# Patient Record
Sex: FEMALE | Race: WHITE | HISPANIC OR LATINO | Employment: UNEMPLOYED | URBAN - METROPOLITAN AREA
[De-identification: names, ages, dates, MRNs, and addresses within clinical notes are randomized per-mention and may not be internally consistent; named-entity substitution may affect disease eponyms.]

---

## 2018-11-26 ENCOUNTER — HOSPITAL ENCOUNTER (INPATIENT)
Facility: MEDICAL CENTER | Age: 38
LOS: 1 days | DRG: 638 | End: 2018-11-27
Attending: EMERGENCY MEDICINE | Admitting: HOSPITALIST

## 2018-11-26 DIAGNOSIS — R73.9 HYPERGLYCEMIA: ICD-10-CM

## 2018-11-26 DIAGNOSIS — E13.65 OTHER SPECIFIED DIABETES MELLITUS WITH HYPERGLYCEMIA, WITHOUT LONG-TERM CURRENT USE OF INSULIN (HCC): ICD-10-CM

## 2018-11-26 PROBLEM — E11.9 DIABETES MELLITUS, NEW ONSET (HCC): Status: ACTIVE | Noted: 2018-11-26

## 2018-11-26 PROBLEM — E87.1 HYPONATREMIA: Status: ACTIVE | Noted: 2018-11-26

## 2018-11-26 PROBLEM — N30.00 ACUTE CYSTITIS WITHOUT HEMATURIA: Status: ACTIVE | Noted: 2018-11-26

## 2018-11-26 PROBLEM — B37.31 VAGINAL YEAST INFECTION: Status: ACTIVE | Noted: 2018-11-26

## 2018-11-26 LAB
ALBUMIN SERPL BCP-MCNC: 4.3 G/DL (ref 3.2–4.9)
ALBUMIN/GLOB SERPL: 1.2 G/DL
ALP SERPL-CCNC: 143 U/L (ref 30–99)
ALT SERPL-CCNC: 21 U/L (ref 2–50)
ANION GAP SERPL CALC-SCNC: 13 MMOL/L (ref 0–11.9)
APPEARANCE UR: CLEAR
AST SERPL-CCNC: 15 U/L (ref 12–45)
B-OH-BUTYR SERPL-MCNC: 2.27 MMOL/L (ref 0.02–0.27)
BACTERIA #/AREA URNS HPF: ABNORMAL /HPF
BASOPHILS # BLD AUTO: 0.4 % (ref 0–1.8)
BASOPHILS # BLD: 0.04 K/UL (ref 0–0.12)
BILIRUB SERPL-MCNC: 0.4 MG/DL (ref 0.1–1.5)
BILIRUB UR QL STRIP.AUTO: NEGATIVE
BUN SERPL-MCNC: 20 MG/DL (ref 8–22)
CALCIUM SERPL-MCNC: 9.9 MG/DL (ref 8.5–10.5)
CHLORIDE SERPL-SCNC: 90 MMOL/L (ref 96–112)
CO2 SERPL-SCNC: 23 MMOL/L (ref 20–33)
COLOR UR: YELLOW
CREAT SERPL-MCNC: 0.93 MG/DL (ref 0.5–1.4)
EKG IMPRESSION: NORMAL
EOSINOPHIL # BLD AUTO: 0.05 K/UL (ref 0–0.51)
EOSINOPHIL NFR BLD: 0.5 % (ref 0–6.9)
EPI CELLS #/AREA URNS HPF: ABNORMAL /HPF
ERYTHROCYTE [DISTWIDTH] IN BLOOD BY AUTOMATED COUNT: 35.5 FL (ref 35.9–50)
EST. AVERAGE GLUCOSE BLD GHB EST-MCNC: 364 MG/DL
GLOBULIN SER CALC-MCNC: 3.5 G/DL (ref 1.9–3.5)
GLUCOSE BLD-MCNC: 407 MG/DL (ref 65–99)
GLUCOSE BLD-MCNC: 410 MG/DL (ref 65–99)
GLUCOSE BLD-MCNC: 511 MG/DL (ref 65–99)
GLUCOSE BLD-MCNC: 562 MG/DL (ref 65–99)
GLUCOSE SERPL-MCNC: 570 MG/DL (ref 65–99)
GLUCOSE UR STRIP.AUTO-MCNC: >=1000 MG/DL
HBA1C MFR BLD: 14.3 % (ref 0–5.6)
HCT VFR BLD AUTO: 45.3 % (ref 37–47)
HGB BLD-MCNC: 16 G/DL (ref 12–16)
IMM GRANULOCYTES # BLD AUTO: 0.03 K/UL (ref 0–0.11)
IMM GRANULOCYTES NFR BLD AUTO: 0.3 % (ref 0–0.9)
KETONES UR STRIP.AUTO-MCNC: 15 MG/DL
LEUKOCYTE ESTERASE UR QL STRIP.AUTO: NEGATIVE
LYMPHOCYTES # BLD AUTO: 2.76 K/UL (ref 1–4.8)
LYMPHOCYTES NFR BLD: 27.2 % (ref 22–41)
MAGNESIUM SERPL-MCNC: 2 MG/DL (ref 1.5–2.5)
MCH RBC QN AUTO: 29.5 PG (ref 27–33)
MCHC RBC AUTO-ENTMCNC: 35.3 G/DL (ref 33.6–35)
MCV RBC AUTO: 83.6 FL (ref 81.4–97.8)
MICRO URNS: ABNORMAL
MONOCYTES # BLD AUTO: 0.65 K/UL (ref 0–0.85)
MONOCYTES NFR BLD AUTO: 6.4 % (ref 0–13.4)
NEUTROPHILS # BLD AUTO: 6.62 K/UL (ref 2–7.15)
NEUTROPHILS NFR BLD: 65.2 % (ref 44–72)
NITRITE UR QL STRIP.AUTO: NEGATIVE
NRBC # BLD AUTO: 0 K/UL
NRBC BLD-RTO: 0 /100 WBC
PH UR STRIP.AUTO: 5 [PH]
PLATELET # BLD AUTO: 368 K/UL (ref 164–446)
PMV BLD AUTO: 10.5 FL (ref 9–12.9)
POTASSIUM SERPL-SCNC: 3.6 MMOL/L (ref 3.6–5.5)
PROT SERPL-MCNC: 7.8 G/DL (ref 6–8.2)
PROT UR QL STRIP: 30 MG/DL
RBC # BLD AUTO: 5.42 M/UL (ref 4.2–5.4)
RBC # URNS HPF: ABNORMAL /HPF
RBC UR QL AUTO: NEGATIVE
SODIUM SERPL-SCNC: 126 MMOL/L (ref 135–145)
SP GR UR STRIP.AUTO: 1.04
T4 FREE SERPL-MCNC: 1.18 NG/DL (ref 0.53–1.43)
TROPONIN I SERPL-MCNC: <0.01 NG/ML (ref 0–0.04)
TSH SERPL DL<=0.005 MIU/L-ACNC: 1.57 UIU/ML (ref 0.38–5.33)
UROBILINOGEN UR STRIP.AUTO-MCNC: 0.2 MG/DL
WBC # BLD AUTO: 10.2 K/UL (ref 4.8–10.8)
WBC #/AREA URNS HPF: ABNORMAL /HPF

## 2018-11-26 PROCEDURE — 96365 THER/PROPH/DIAG IV INF INIT: CPT

## 2018-11-26 PROCEDURE — 84443 ASSAY THYROID STIM HORMONE: CPT

## 2018-11-26 PROCEDURE — 700102 HCHG RX REV CODE 250 W/ 637 OVERRIDE(OP): Performed by: EMERGENCY MEDICINE

## 2018-11-26 PROCEDURE — 80053 COMPREHEN METABOLIC PANEL: CPT

## 2018-11-26 PROCEDURE — 87186 SC STD MICRODIL/AGAR DIL: CPT

## 2018-11-26 PROCEDURE — 81001 URINALYSIS AUTO W/SCOPE: CPT

## 2018-11-26 PROCEDURE — 93005 ELECTROCARDIOGRAM TRACING: CPT | Performed by: EMERGENCY MEDICINE

## 2018-11-26 PROCEDURE — 700105 HCHG RX REV CODE 258: Performed by: EMERGENCY MEDICINE

## 2018-11-26 PROCEDURE — 770009 HCHG ROOM/CARE - ONCOLOGY SEMI PRI*

## 2018-11-26 PROCEDURE — 96361 HYDRATE IV INFUSION ADD-ON: CPT

## 2018-11-26 PROCEDURE — A9270 NON-COVERED ITEM OR SERVICE: HCPCS | Performed by: HOSPITALIST

## 2018-11-26 PROCEDURE — 82010 KETONE BODYS QUAN: CPT

## 2018-11-26 PROCEDURE — 700111 HCHG RX REV CODE 636 W/ 250 OVERRIDE (IP): Performed by: HOSPITALIST

## 2018-11-26 PROCEDURE — 93005 ELECTROCARDIOGRAM TRACING: CPT

## 2018-11-26 PROCEDURE — 36415 COLL VENOUS BLD VENIPUNCTURE: CPT

## 2018-11-26 PROCEDURE — 84439 ASSAY OF FREE THYROXINE: CPT

## 2018-11-26 PROCEDURE — 99223 1ST HOSP IP/OBS HIGH 75: CPT | Performed by: HOSPITALIST

## 2018-11-26 PROCEDURE — 700105 HCHG RX REV CODE 258: Performed by: HOSPITALIST

## 2018-11-26 PROCEDURE — 99285 EMERGENCY DEPT VISIT HI MDM: CPT

## 2018-11-26 PROCEDURE — 87077 CULTURE AEROBIC IDENTIFY: CPT

## 2018-11-26 PROCEDURE — 82962 GLUCOSE BLOOD TEST: CPT | Mod: 91

## 2018-11-26 PROCEDURE — 96375 TX/PRO/DX INJ NEW DRUG ADDON: CPT

## 2018-11-26 PROCEDURE — 700102 HCHG RX REV CODE 250 W/ 637 OVERRIDE(OP): Performed by: HOSPITALIST

## 2018-11-26 PROCEDURE — 85025 COMPLETE CBC W/AUTO DIFF WBC: CPT

## 2018-11-26 PROCEDURE — 83735 ASSAY OF MAGNESIUM: CPT

## 2018-11-26 PROCEDURE — 87086 URINE CULTURE/COLONY COUNT: CPT

## 2018-11-26 PROCEDURE — 84484 ASSAY OF TROPONIN QUANT: CPT

## 2018-11-26 PROCEDURE — 83036 HEMOGLOBIN GLYCOSYLATED A1C: CPT

## 2018-11-26 RX ORDER — FLUCONAZOLE 100 MG/1
100 TABLET ORAL DAILY
Status: DISCONTINUED | OUTPATIENT
Start: 2018-11-27 | End: 2018-11-26

## 2018-11-26 RX ORDER — PROMETHAZINE HYDROCHLORIDE 25 MG/1
12.5-25 TABLET ORAL EVERY 4 HOURS PRN
Status: DISCONTINUED | OUTPATIENT
Start: 2018-11-26 | End: 2018-11-27 | Stop reason: HOSPADM

## 2018-11-26 RX ORDER — PROMETHAZINE HYDROCHLORIDE 25 MG/1
12.5-25 SUPPOSITORY RECTAL EVERY 4 HOURS PRN
Status: DISCONTINUED | OUTPATIENT
Start: 2018-11-26 | End: 2018-11-27 | Stop reason: HOSPADM

## 2018-11-26 RX ORDER — ONDANSETRON 4 MG/1
4 TABLET, ORALLY DISINTEGRATING ORAL EVERY 4 HOURS PRN
Status: DISCONTINUED | OUTPATIENT
Start: 2018-11-26 | End: 2018-11-27 | Stop reason: HOSPADM

## 2018-11-26 RX ORDER — ACETAMINOPHEN 325 MG/1
650 TABLET ORAL EVERY 6 HOURS PRN
Status: DISCONTINUED | OUTPATIENT
Start: 2018-11-26 | End: 2018-11-27 | Stop reason: HOSPADM

## 2018-11-26 RX ORDER — BISACODYL 10 MG
10 SUPPOSITORY, RECTAL RECTAL
Status: DISCONTINUED | OUTPATIENT
Start: 2018-11-26 | End: 2018-11-27 | Stop reason: HOSPADM

## 2018-11-26 RX ORDER — POLYETHYLENE GLYCOL 3350 17 G/17G
1 POWDER, FOR SOLUTION ORAL
Status: DISCONTINUED | OUTPATIENT
Start: 2018-11-26 | End: 2018-11-27 | Stop reason: HOSPADM

## 2018-11-26 RX ORDER — ENALAPRILAT 1.25 MG/ML
1.25 INJECTION INTRAVENOUS EVERY 6 HOURS PRN
Status: DISCONTINUED | OUTPATIENT
Start: 2018-11-26 | End: 2018-11-27 | Stop reason: HOSPADM

## 2018-11-26 RX ORDER — SODIUM CHLORIDE 9 MG/ML
1000 INJECTION, SOLUTION INTRAVENOUS ONCE
Status: COMPLETED | OUTPATIENT
Start: 2018-11-26 | End: 2018-11-26

## 2018-11-26 RX ORDER — DEXTROSE MONOHYDRATE 25 G/50ML
25 INJECTION, SOLUTION INTRAVENOUS
Status: DISCONTINUED | OUTPATIENT
Start: 2018-11-26 | End: 2018-11-27 | Stop reason: HOSPADM

## 2018-11-26 RX ORDER — AMOXICILLIN 250 MG
2 CAPSULE ORAL 2 TIMES DAILY
Status: DISCONTINUED | OUTPATIENT
Start: 2018-11-26 | End: 2018-11-27 | Stop reason: HOSPADM

## 2018-11-26 RX ORDER — ONDANSETRON 2 MG/ML
4 INJECTION INTRAMUSCULAR; INTRAVENOUS EVERY 4 HOURS PRN
Status: DISCONTINUED | OUTPATIENT
Start: 2018-11-26 | End: 2018-11-27 | Stop reason: HOSPADM

## 2018-11-26 RX ORDER — FLUCONAZOLE 100 MG/1
100 TABLET ORAL DAILY
Status: DISCONTINUED | OUTPATIENT
Start: 2018-11-27 | End: 2018-11-27 | Stop reason: HOSPADM

## 2018-11-26 RX ORDER — SODIUM CHLORIDE 9 MG/ML
INJECTION, SOLUTION INTRAVENOUS CONTINUOUS
Status: DISCONTINUED | OUTPATIENT
Start: 2018-11-26 | End: 2018-11-27

## 2018-11-26 RX ADMIN — INSULIN HUMAN 9 UNITS: 100 INJECTION, SOLUTION PARENTERAL at 20:10

## 2018-11-26 RX ADMIN — INSULIN HUMAN 9 UNITS: 100 INJECTION, SOLUTION PARENTERAL at 17:52

## 2018-11-26 RX ADMIN — INSULIN HUMAN 10 UNITS: 100 INJECTION, SOLUTION PARENTERAL at 09:42

## 2018-11-26 RX ADMIN — SODIUM CHLORIDE: 9 INJECTION, SOLUTION INTRAVENOUS at 12:30

## 2018-11-26 RX ADMIN — SODIUM CHLORIDE: 9 INJECTION, SOLUTION INTRAVENOUS at 22:05

## 2018-11-26 RX ADMIN — INSULIN HUMAN 10 UNITS: 100 INJECTION, SUSPENSION SUBCUTANEOUS at 17:52

## 2018-11-26 RX ADMIN — SODIUM CHLORIDE 1000 ML: 9 INJECTION, SOLUTION INTRAVENOUS at 09:13

## 2018-11-26 RX ADMIN — ACETAMINOPHEN 650 MG: 325 TABLET, FILM COATED ORAL at 17:59

## 2018-11-26 RX ADMIN — CEFTRIAXONE SODIUM 2 G: 2 INJECTION, POWDER, FOR SOLUTION INTRAMUSCULAR; INTRAVENOUS at 13:25

## 2018-11-26 ASSESSMENT — COGNITIVE AND FUNCTIONAL STATUS - GENERAL
MOBILITY SCORE: 24
SUGGESTED CMS G CODE MODIFIER DAILY ACTIVITY: CH
DAILY ACTIVITIY SCORE: 24
SUGGESTED CMS G CODE MODIFIER MOBILITY: CH

## 2018-11-26 ASSESSMENT — ENCOUNTER SYMPTOMS
SHORTNESS OF BREATH: 0
COUGH: 0
BACK PAIN: 0
WEIGHT LOSS: 0
NECK PAIN: 0
BLURRED VISION: 1
PALPITATIONS: 0
DEPRESSION: 0
CHILLS: 0
SORE THROAT: 0
NAUSEA: 0
NERVOUS/ANXIOUS: 0
HEADACHES: 0
ABDOMINAL PAIN: 0
DIARRHEA: 0
SENSORY CHANGE: 0
FEVER: 0

## 2018-11-26 ASSESSMENT — PATIENT HEALTH QUESTIONNAIRE - PHQ9
2. FEELING DOWN, DEPRESSED, IRRITABLE, OR HOPELESS: NOT AT ALL
SUM OF ALL RESPONSES TO PHQ9 QUESTIONS 1 AND 2: 0
1. LITTLE INTEREST OR PLEASURE IN DOING THINGS: NOT AT ALL

## 2018-11-26 ASSESSMENT — COPD QUESTIONNAIRES
DO YOU EVER COUGH UP ANY MUCUS OR PHLEGM?: NO/ONLY WITH OCCASIONAL COLDS OR INFECTIONS
IN THE PAST 12 MONTHS DO YOU DO LESS THAN YOU USED TO BECAUSE OF YOUR BREATHING PROBLEMS: DISAGREE/UNSURE
DURING THE PAST 4 WEEKS HOW MUCH DID YOU FEEL SHORT OF BREATH: NONE/LITTLE OF THE TIME
COPD SCREENING SCORE: 0
HAVE YOU SMOKED AT LEAST 100 CIGARETTES IN YOUR ENTIRE LIFE: NO/DON'T KNOW

## 2018-11-26 ASSESSMENT — PAIN SCALES - GENERAL
PAINLEVEL_OUTOF10: 0

## 2018-11-26 ASSESSMENT — LIFESTYLE VARIABLES
ALCOHOL_USE: NO
EVER_SMOKED: YES

## 2018-11-26 NOTE — ED TRIAGE NOTES
"Chief Complaint   Patient presents with   • Blurred Vision     x 2 weeks.   • Polydipsia     \"c/o im constantly thirsty\".  also c/o nausea.   • Weight Loss     x 2 weeks.   • Fatigue     x 2 weeks. able to ambulate w/steady gait.   • Muscle Pain     all my muscles are hurting.     Has multiple complaints. Pt states \" I feel like I have a cancer or diabetes\". Denies history of diabetes. NAD. Triage process explained instructed to notify staff for any worsening symptoms. Fingerstick [id number]  "

## 2018-11-26 NOTE — PROGRESS NOTES
Pt arrived to unit. Oriented to floor and room. AOx4, upself. Denies pain or nausea. States she is hungry, provided snacks while pt waits for meal tray. PIV in place, IV abx infusing. Fluids set up to infuse after abx finish. Call light within reach, bed in low and locked position, all needs met at this time.

## 2018-11-26 NOTE — ED PROVIDER NOTES
"ED Provider Note    CHIEF COMPLAINT  Chief Complaint   Patient presents with   • Blurred Vision     x 2 weeks.   • Polydipsia     \"c/o im constantly thirsty\".  also c/o nausea.   • Weight Loss     x 2 weeks.   • Fatigue     x 2 weeks. able to ambulate w/steady gait.   • Muscle Pain     all my muscles are hurting.       HPI  Tonya Bro is a 38 y.o. female who presents complaining of thirst, blurred vision, frequent urination and fatigue.   Onset of symptoms 2 weeks ago.  She has diffuse myalgias and believes she has lost weight.  She states \"I think I have diabetes or cancer or something.  She denies family history of diabetes.  She denies history herself of elevated blood sugar.  Vision disturbance is described as generalized blurriness both eyes.  Symptoms are gradual onset while at rest at home    REVIEW OF SYSTEMS    Constitutional: Fatigue  Respiratory: No shortness of breath  Cardiac: No chest pain or syncope  Gastrointestinal: No abdominal pain  Musculoskeletal: Diffuse myalgia  Neurologic: No headache, no numbness or weakness  Genitourinary: Polyuria       All other systems are negative.       PAST MEDICAL HISTORY  No past medical history on file.    FAMILY HISTORY  No family history on file.    SOCIAL HISTORY  Social History     Social History   • Marital status:      Spouse name: N/A   • Number of children: N/A   • Years of education: N/A     Social History Main Topics   • Smoking status: Not on file   • Smokeless tobacco: Not on file   • Alcohol use Not on file   • Drug use: Unknown   • Sexual activity: Not on file     Other Topics Concern   • Not on file     Social History Narrative   • No narrative on file       SURGICAL HISTORY  No past surgical history on file.    CURRENT MEDICATIONS  Home Medications     Reviewed by Danielle Reno (Pharmacy Tech) on 11/26/18 at 1132  Med List Status: Complete   Medication Last Dose Status        Patient Nimesh Taking any Medications        " "               ALLERGIES  No Known Allergies    PHYSICAL EXAM  VITAL SIGNS: /73   Pulse 97   Temp 36.8 °C (98.3 °F) (Oral)   Resp 19   Ht 1.6 m (5' 3\")   Wt 68.3 kg (150 lb 9.2 oz)   SpO2 96%   BMI 26.67 kg/m²    Constitutional:  Non-toxic appearance.   HENT: No facial swelling.  Mucous membranes dry  Eyes: Anicteric, no conjunctivitis.     Cardiovascular: Tachycardic heart rate, Normal rhythm  Pulmonary:  No wheezing, No rales.   Gastrointestinal: Soft, No tenderness, No masses, no distention  Skin: Warm, Dry, No cyanosis.  No peripheral edema  Neurologic: Speech is clear, follows commands, facial expression is symmetrical.   strength and leg strength equal  Psychiatric: Mildly anxious, later normal mood with normal affect  Musculoskeletal: No flank tenderness    EKG/Labs  Results for orders placed or performed during the hospital encounter of 11/26/18   CBC WITH DIFFERENTIAL   Result Value Ref Range    WBC 10.2 4.8 - 10.8 K/uL    RBC 5.42 (H) 4.20 - 5.40 M/uL    Hemoglobin 16.0 12.0 - 16.0 g/dL    Hematocrit 45.3 37.0 - 47.0 %    MCV 83.6 81.4 - 97.8 fL    MCH 29.5 27.0 - 33.0 pg    MCHC 35.3 (H) 33.6 - 35.0 g/dL    RDW 35.5 (L) 35.9 - 50.0 fL    Platelet Count 368 164 - 446 K/uL    MPV 10.5 9.0 - 12.9 fL    Neutrophils-Polys 65.20 44.00 - 72.00 %    Lymphocytes 27.20 22.00 - 41.00 %    Monocytes 6.40 0.00 - 13.40 %    Eosinophils 0.50 0.00 - 6.90 %    Basophils 0.40 0.00 - 1.80 %    Immature Granulocytes 0.30 0.00 - 0.90 %    Nucleated RBC 0.00 /100 WBC    Neutrophils (Absolute) 6.62 2.00 - 7.15 K/uL    Lymphs (Absolute) 2.76 1.00 - 4.80 K/uL    Monos (Absolute) 0.65 0.00 - 0.85 K/uL    Eos (Absolute) 0.05 0.00 - 0.51 K/uL    Baso (Absolute) 0.04 0.00 - 0.12 K/uL    Immature Granulocytes (abs) 0.03 0.00 - 0.11 K/uL    NRBC (Absolute) 0.00 K/uL   COMP METABOLIC PANEL   Result Value Ref Range    Sodium 126 (L) 135 - 145 mmol/L    Potassium 3.6 3.6 - 5.5 mmol/L    Chloride 90 (L) 96 - 112 mmol/L    " Co2 23 20 - 33 mmol/L    Anion Gap 13.0 (H) 0.0 - 11.9    Glucose 570 (HH) 65 - 99 mg/dL    Bun 20 8 - 22 mg/dL    Creatinine 0.93 0.50 - 1.40 mg/dL    Calcium 9.9 8.5 - 10.5 mg/dL    AST(SGOT) 15 12 - 45 U/L    ALT(SGPT) 21 2 - 50 U/L    Alkaline Phosphatase 143 (H) 30 - 99 U/L    Total Bilirubin 0.4 0.1 - 1.5 mg/dL    Albumin 4.3 3.2 - 4.9 g/dL    Total Protein 7.8 6.0 - 8.2 g/dL    Globulin 3.5 1.9 - 3.5 g/dL    A-G Ratio 1.2 g/dL   URINALYSIS CULTURE, IF INDICATED   Result Value Ref Range    Color Yellow     Character Clear     Specific Gravity 1.037 <1.035    Ph 5.0 5.0 - 8.0    Glucose >=1000 (A) Negative mg/dL    Ketones 15 (A) Negative mg/dL    Protein 30 (A) Negative mg/dL    Bilirubin Negative Negative    Urobilinogen, Urine 0.2 Negative    Nitrite Negative Negative    Leukocyte Esterase Negative Negative    Occult Blood Negative Negative    Micro Urine Req Microscopic    ESTIMATED GFR   Result Value Ref Range    GFR If African American >60 >60 mL/min/1.73 m 2    GFR If Non African American >60 >60 mL/min/1.73 m 2   URINE MICROSCOPIC (W/UA)   Result Value Ref Range    WBC 2-5 /hpf    RBC 0-2 /hpf    Bacteria Moderate (A) None /hpf    Epithelial Cells Few /hpf   TROPONIN   Result Value Ref Range    Troponin I <0.01 0.00 - 0.04 ng/mL   MAGNESIUM   Result Value Ref Range    Magnesium 2.0 1.5 - 2.5 mg/dL   ACCU-CHEK GLUCOSE   Result Value Ref Range    Glucose - Accu-Ck 562 (HH) 65 - 99 mg/dL   ACCU-CHEK GLUCOSE   Result Value Ref Range    Glucose - Accu-Ck 407 (HH) 65 - 99 mg/dL   EKG   Result Value Ref Range    Report       [facility] Emergency Dept.    Test Date:  2018  Pt Name:    JASON DEAN           Department: ER  MRN:        [id number]                      Room:        03  Gender:     Female                       Technician: [id number]  :        1980                   Requested By:ER TRIAGE PROTOCOL  Order #:    [id number]                    Reading  MD:    Measurements  Intervals                                Axis  Rate:       84                           P:          19  WI:         136                          QRS:        35  QRSD:       82                           T:          16  QT:         368  QTc:        436    Interpretive Statements  SINUS RHYTHM  No previous ECG available for comparison           COURSE & MEDICAL DECISION MAKING  Pertinent Labs & Imaging studies reviewed. (See chart for details)  With some evidence of dehydration as well as hyperglycemia, IV fluids have been started.HYDRATION: Based on the patient's presentation of Hyperglycemia the patient was given IV fluids. IV Hydration was used because oral hydration was not adequate alone. Upon recheck following hydration, the patient was Improved with lowered blood sugar. Patient also given insulin IV.  Repeat blood sugar was down to 400.  Patient appears to be new onset diabetes, is admitted for ongoing workup stabilization as well as patient education regarding treatment of this illness.  Of note, patient complained of vaginal itching, refused pelvic exam in the emergency department.      FINAL IMPRESSION     1. Hyperglycemia    2. Other specified diabetes mellitus with hyperglycemia, without long-term current use of insulin (HCC)                    Electronically signed by: Victor Manuel Elkins, 11/26/2018 2:19 PM

## 2018-11-27 VITALS
SYSTOLIC BLOOD PRESSURE: 131 MMHG | RESPIRATION RATE: 18 BRPM | TEMPERATURE: 98 F | WEIGHT: 150.57 LBS | DIASTOLIC BLOOD PRESSURE: 87 MMHG | OXYGEN SATURATION: 96 % | HEART RATE: 74 BPM | BODY MASS INDEX: 26.68 KG/M2 | HEIGHT: 63 IN

## 2018-11-27 PROBLEM — E87.1 HYPONATREMIA: Status: RESOLVED | Noted: 2018-11-26 | Resolved: 2018-11-27

## 2018-11-27 LAB
ALBUMIN SERPL BCP-MCNC: 3.2 G/DL (ref 3.2–4.9)
ALBUMIN/GLOB SERPL: 1.1 G/DL
ALP SERPL-CCNC: 80 U/L (ref 30–99)
ALT SERPL-CCNC: 12 U/L (ref 2–50)
ANION GAP SERPL CALC-SCNC: 6 MMOL/L (ref 0–11.9)
AST SERPL-CCNC: 11 U/L (ref 12–45)
BASOPHILS # BLD AUTO: 0.3 % (ref 0–1.8)
BASOPHILS # BLD: 0.03 K/UL (ref 0–0.12)
BILIRUB SERPL-MCNC: 0.3 MG/DL (ref 0.1–1.5)
BUN SERPL-MCNC: 17 MG/DL (ref 8–22)
CALCIUM SERPL-MCNC: 8.6 MG/DL (ref 8.5–10.5)
CHLORIDE SERPL-SCNC: 105 MMOL/L (ref 96–112)
CO2 SERPL-SCNC: 22 MMOL/L (ref 20–33)
CREAT SERPL-MCNC: 0.77 MG/DL (ref 0.5–1.4)
EOSINOPHIL # BLD AUTO: 0.08 K/UL (ref 0–0.51)
EOSINOPHIL NFR BLD: 0.8 % (ref 0–6.9)
ERYTHROCYTE [DISTWIDTH] IN BLOOD BY AUTOMATED COUNT: 38.2 FL (ref 35.9–50)
GLOBULIN SER CALC-MCNC: 2.8 G/DL (ref 1.9–3.5)
GLUCOSE BLD-MCNC: 205 MG/DL (ref 65–99)
GLUCOSE BLD-MCNC: 228 MG/DL (ref 65–99)
GLUCOSE BLD-MCNC: 259 MG/DL (ref 65–99)
GLUCOSE BLD-MCNC: 397 MG/DL (ref 65–99)
GLUCOSE BLD-MCNC: 430 MG/DL (ref 65–99)
GLUCOSE SERPL-MCNC: 239 MG/DL (ref 65–99)
HCT VFR BLD AUTO: 39 % (ref 37–47)
HGB BLD-MCNC: 13.1 G/DL (ref 12–16)
IMM GRANULOCYTES # BLD AUTO: 0.02 K/UL (ref 0–0.11)
IMM GRANULOCYTES NFR BLD AUTO: 0.2 % (ref 0–0.9)
LYMPHOCYTES # BLD AUTO: 3.47 K/UL (ref 1–4.8)
LYMPHOCYTES NFR BLD: 35.6 % (ref 22–41)
MCH RBC QN AUTO: 29.4 PG (ref 27–33)
MCHC RBC AUTO-ENTMCNC: 33.6 G/DL (ref 33.6–35)
MCV RBC AUTO: 87.6 FL (ref 81.4–97.8)
MONOCYTES # BLD AUTO: 0.57 K/UL (ref 0–0.85)
MONOCYTES NFR BLD AUTO: 5.8 % (ref 0–13.4)
NEUTROPHILS # BLD AUTO: 5.59 K/UL (ref 2–7.15)
NEUTROPHILS NFR BLD: 57.3 % (ref 44–72)
NRBC # BLD AUTO: 0 K/UL
NRBC BLD-RTO: 0 /100 WBC
PLATELET # BLD AUTO: 280 K/UL (ref 164–446)
PMV BLD AUTO: 10.3 FL (ref 9–12.9)
POTASSIUM SERPL-SCNC: 3.4 MMOL/L (ref 3.6–5.5)
PROT SERPL-MCNC: 6 G/DL (ref 6–8.2)
RBC # BLD AUTO: 4.45 M/UL (ref 4.2–5.4)
SODIUM SERPL-SCNC: 133 MMOL/L (ref 135–145)
WBC # BLD AUTO: 9.8 K/UL (ref 4.8–10.8)

## 2018-11-27 PROCEDURE — 700111 HCHG RX REV CODE 636 W/ 250 OVERRIDE (IP): Performed by: HOSPITALIST

## 2018-11-27 PROCEDURE — 700102 HCHG RX REV CODE 250 W/ 637 OVERRIDE(OP): Performed by: HOSPITALIST

## 2018-11-27 PROCEDURE — 80053 COMPREHEN METABOLIC PANEL: CPT

## 2018-11-27 PROCEDURE — 82962 GLUCOSE BLOOD TEST: CPT

## 2018-11-27 PROCEDURE — A9270 NON-COVERED ITEM OR SERVICE: HCPCS | Performed by: HOSPITALIST

## 2018-11-27 PROCEDURE — 36415 COLL VENOUS BLD VENIPUNCTURE: CPT

## 2018-11-27 PROCEDURE — 700105 HCHG RX REV CODE 258: Performed by: HOSPITALIST

## 2018-11-27 PROCEDURE — 85025 COMPLETE CBC W/AUTO DIFF WBC: CPT

## 2018-11-27 PROCEDURE — 99239 HOSP IP/OBS DSCHRG MGMT >30: CPT | Performed by: HOSPITALIST

## 2018-11-27 RX ORDER — FLUCONAZOLE 100 MG/1
100 TABLET ORAL DAILY
Qty: 2 TAB | Refills: 1 | Status: SHIPPED | OUTPATIENT
Start: 2018-11-28 | End: 2018-11-30

## 2018-11-27 RX ORDER — POTASSIUM CHLORIDE 20 MEQ/1
20 TABLET, EXTENDED RELEASE ORAL DAILY
Status: DISCONTINUED | OUTPATIENT
Start: 2018-11-27 | End: 2018-11-27 | Stop reason: HOSPADM

## 2018-11-27 RX ORDER — CEFDINIR 300 MG/1
300 CAPSULE ORAL 2 TIMES DAILY
Qty: 6 CAP | Refills: 0 | Status: SHIPPED | OUTPATIENT
Start: 2018-11-28 | End: 2018-12-01

## 2018-11-27 RX ADMIN — INSULIN HUMAN 10 UNITS: 100 INJECTION, SUSPENSION SUBCUTANEOUS at 18:24

## 2018-11-27 RX ADMIN — INSULIN HUMAN 9 UNITS: 100 INJECTION, SOLUTION PARENTERAL at 14:08

## 2018-11-27 RX ADMIN — FLUCONAZOLE 100 MG: 100 TABLET ORAL at 05:50

## 2018-11-27 RX ADMIN — INSULIN HUMAN 3 UNITS: 100 INJECTION, SOLUTION PARENTERAL at 09:25

## 2018-11-27 RX ADMIN — INSULIN HUMAN 10 UNITS: 100 INJECTION, SUSPENSION SUBCUTANEOUS at 05:51

## 2018-11-27 RX ADMIN — CEFTRIAXONE SODIUM 2 G: 2 INJECTION, POWDER, FOR SOLUTION INTRAMUSCULAR; INTRAVENOUS at 14:39

## 2018-11-27 RX ADMIN — POTASSIUM CHLORIDE 20 MEQ: 1500 TABLET, EXTENDED RELEASE ORAL at 14:39

## 2018-11-27 RX ADMIN — SODIUM CHLORIDE: 9 INJECTION, SOLUTION INTRAVENOUS at 05:50

## 2018-11-27 RX ADMIN — METFORMIN HYDROCHLORIDE 500 MG: 500 TABLET ORAL at 18:24

## 2018-11-27 NOTE — H&P
"Hospital Medicine History & Physical Note    Date of Service  11/26/2018    Primary Care Physician  No primary care provider on file.  Patient just moved to [place] from [facility]    Consultants  None    Code Status  Full    Chief Complaint  General malaise, extreme thirst, polydipsia, frequent urination, vaginal itching    History of Presenting Illness  Previously healthy 38 y.o. female who presented 11/26/2018 with the above chief complaints who presented to the emergency room is found to have new onset diabetes with blood sugars in the 500s.  She had minimal anion gap and mildly elevated beta hydroxybutyrate but normal CO2.  She has been having blurred vision, feels like she has had dry skin as well as hair loss she was initially concerned of her thyroid or possible cancer.  Is also been complaining some vaginal itching with concern of yeast infection.  She has had polydipsia along with polyuria.     Review of Systems  Review of Systems   Constitutional: Positive for malaise/fatigue. Negative for chills, fever and weight loss.   HENT: Negative for congestion, sore throat (\"dry mouth\") and tinnitus.    Eyes: Positive for blurred vision.   Respiratory: Negative for cough and shortness of breath.    Cardiovascular: Negative for chest pain, palpitations and leg swelling.   Gastrointestinal: Negative for abdominal pain, diarrhea and nausea.   Genitourinary: Positive for frequency. Negative for dysuria.   Musculoskeletal: Negative for back pain and neck pain.   Skin: Negative for rash.   Neurological: Negative for sensory change and headaches.   Psychiatric/Behavioral: Negative for depression. The patient is not nervous/anxious.        Past Medical History   has a past medical history of Diabetes ([facility]) and Vaginal yeast infection (11/26/2018). She also has no past medical history of Anginal syndrome ([facility]); Arrhythmia; Arthritis; Asthma; At risk for sleep apnea; Back pain; Blood clotting disorder ([facility]); " Bronchitis; Cancer (HCC); Cataract; Congestive heart failure (HCC); COPD (chronic obstructive pulmonary disease) (HCC); Dialysis patient ([facility]); Disorder of thyroid; Fall; Glaucoma; Heart murmur; Heart valve disease; Hemorrhagic disorder (HCC); Hypertension; Indigestion; Jaundice; Myocardial infarct (HCC); Pacemaker; Pneumonia; Psychiatric problem; Renal disorder; Rheumatic fever; Seizure (HCC); Stroke (HCC); or Urinary incontinence.    Surgical History      Family History  Both parents are alive and well    Social History   reports that she has quit smoking. She has never used smokeless tobacco. She reports that she does not drink alcohol or use drugs.   , has children.  Social use of alcohol.  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    Allergies  Allergies   Allergen Reactions   • Bloodless        Medications  None       Physical Exam  Temp:  [36.4 °C (97.5 °F)-36.8 °C (98.3 °F)] 36.8 °C (98.3 °F)  Pulse:  [71-97] 97  Resp:  [16-24] 19  BP: (113-150)/() 113/73    Physical Exam   Constitutional: She is oriented to person, place, and time. She appears well-nourished. No distress.   HENT:   Head: Normocephalic and atraumatic.   Nose: Nose normal.   Mouth/Throat: No oropharyngeal exudate.   Eyes: Conjunctivae and EOM are normal. Right eye exhibits no discharge. Left eye exhibits no discharge. No scleral icterus.   Neck: No tracheal deviation present.   Cardiovascular: Normal rate, regular rhythm, normal heart sounds and intact distal pulses.    No murmur heard.  Pulmonary/Chest: Effort normal and breath sounds normal. No stridor. No respiratory distress. She has no wheezes.   Abdominal: Soft. Bowel sounds are normal. She exhibits no distension. There is no tenderness.   Musculoskeletal: She exhibits no edema.   Neurological: She is alert and oriented to person, place, and time. No cranial nerve deficit.   Skin: Skin is warm. She is not diaphoretic.   Psychiatric: She has a normal mood and affect.  Her behavior is normal.   Vitals reviewed.      Laboratory:  Recent Labs      11/26/18   0838   WBC  10.2   RBC  5.42*   HEMOGLOBIN  16.0   HEMATOCRIT  45.3   MCV  83.6   MCH  29.5   MCHC  35.3*   RDW  35.5*   PLATELETCT  368   MPV  10.5     Recent Labs      11/26/18   0838   SODIUM  126*   POTASSIUM  3.6   CHLORIDE  90*   CO2  23   GLUCOSE  570*   BUN  20   CREATININE  0.93   CALCIUM  9.9     Recent Labs      11/26/18   0838   ALTSGPT  21   ASTSGOT  15   ALKPHOSPHAT  143*   TBILIRUBIN  0.4   GLUCOSE  570*                 Recent Labs      11/26/18   1330   TROPONINI  <0.01       Urinalysis:    Recent Labs      11/26/18   1001   SPECGRAVITY  1.037   GLUCOSEUR  >=1000*   KETONES  15*   NITRITE  Negative   LEUKESTERAS  Negative   WBCURINE  2-5   RBCURINE  0-2   BACTERIA  Moderate*   EPITHELCELL  Few        Imaging:  No orders to display         Assessment/Plan:  I anticipate this patient will require at least two midnights for appropriate medical management, necessitating inpatient admission.    * Diabetes mellitus, new onset (HCC)   Assessment & Plan    11/26/18 glycohemoglobin: 14.3  Monitor Accu-Cheks  Cover with sliding scale insulin  Initiated on NPH 10 units twice daily  Diabetic diet  Diabetes education and counseling     Acute cystitis without hematuria   Assessment & Plan    Urine sent for culture  Initiated IV antibiotics     Hyponatremia   Assessment & Plan    Likely pseudohyponatremia secondary to hyperglycemia  Treating hyperglycemia will monitor BMP  Continue with IV fluids     Vaginal yeast infection   Assessment & Plan    Diflucan         VTE prophylaxis: Lovenox
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
## 2018-11-27 NOTE — CARE PLAN
Problem: Communication  Goal: The ability to communicate needs accurately and effectively will improve  Outcome: PROGRESSING AS EXPECTED  Discussed POC for this shift to include medication, FSBG, family visiting hours. Patient agrees, all needs met at this time.    Problem: Urinary Elimination:  Goal: Ability to reestablish a normal urinary elimination pattern will improve  Outcome: PROGRESSING AS EXPECTED  Pt states UTI symptoms are improving. She is still experiencing frequency and urgency but states it is improving.

## 2018-11-27 NOTE — PROGRESS NOTES
Spoke with Dr. Boogie and updated him of x2 FSBS > 400. Per Dr. Boogie, recheck FSBS in a few hours and update on-call hospitalist of results. He would like to watch patient slowly come down since she just received the first dose of NPH around 1800 tonight.

## 2018-11-27 NOTE — ASSESSMENT & PLAN NOTE
11/26/18 glycohemoglobin: 14.3  Monitor Accu-Cheks  Cover with sliding scale insulin  Initiated on NPH 10 units twice daily  Diabetic diet  Diabetes education and counseling

## 2018-11-27 NOTE — ASSESSMENT & PLAN NOTE
Likely pseudohyponatremia secondary to hyperglycemia  Treating hyperglycemia will monitor BMP  Continue with IV fluids

## 2018-11-27 NOTE — PROGRESS NOTES
Spoke with on-call Dr. Mcdonald with updates of patients FSBS down to 228. Dr. Mcdonald okay with patients blood sugar continuing to come down slowly, no new orders received at this time.

## 2018-11-27 NOTE — PROGRESS NOTES
Assumed pt care @ 0700, patient is A&Ox4, VSS, no c/o pain/nausea. Pt states she is thirsty and has sore throat, water provided. PIV in place and patent, infusing IV-ABX. POC for shift discussed, all questions answered, call light and personal effects w/i reach, bed in lowest/locked position, hourly rounding in place.

## 2018-11-27 NOTE — CARE PLAN
Problem: Communication  Goal: The ability to communicate needs accurately and effectively will improve    Intervention: Educate patient and significant other/support system about the plan of care, procedures, treatments, medications and allow for questions  Patient educated on POC and disease process, all questions answered at this time.       Problem: Knowledge Deficit  Goal: Knowledge of disease process/condition, treatment plan, diagnostic tests, and medications will improve    Intervention: Assess knowledge level of disease process/condition, treatment plan, diagnostic tests, and medications  Patient assessed for knowledge of disease process.

## 2018-11-28 LAB
BACTERIA UR CULT: ABNORMAL
BACTERIA UR CULT: ABNORMAL
SIGNIFICANT IND 70042: ABNORMAL
SITE SITE: ABNORMAL
SOURCE SOURCE: ABNORMAL

## 2018-11-28 NOTE — PROGRESS NOTES
Diabetes education: Met with patient this evening. Please see consult note.  Plan: Pt will not have her insurance ( [facility]) start until December 1st. Recommended she use Humalog kwik pens ( coupon for 5 pens given at no cost), and possibly either use sample long acting pen from [facility] or have SW pay for vial of Levemir  (  $ 7.00 for SW). Pt is anxious to go home tonight and go back to work tomorrow. Getting  coverage or samples from  not possible. If pt does go today she must go home on NPH (written as Novolin/Humulin or Reli-On so she can get it for $26 a vial) vial .   Prescriptions should be: NPH vial, Humalog kwik pen (box of five) with sliding scale written out and for ac only, nitza pen needles ( box of 100), 3/10 cc syringes ( box of 100 6 mm), test strips for Accucheck Guide ( to access coupon) to test 3 times a day and fast clix lancets to test 3 times a day.

## 2018-11-28 NOTE — DISCHARGE INSTRUCTIONS
Discharge Instructions    Discharged to home by car with relative. Discharged via walking, hospital escort: Refused.  Special equipment needed: Not Applicable    Be sure to schedule a follow-up appointment with your primary care doctor or any specialists as instructed.     Discharge Plan:   Smoking Cessation Offered: Patient Refused  Pneumococcal Vaccine Administered/Refused: Not given - Patient refused pneumococcal vaccine  Influenza Vaccine Indication: Patient Refuses    I understand that a diet low in cholesterol, fat, and sodium is recommended for good health. Unless I have been given specific instructions below for another diet, I accept this instruction as my diet prescription.   Other diet: Diabetic    Special Instructions: None    · Is patient discharged on Warfarin / Coumadin?   No     Depression / Suicide Risk    As you are discharged from this [facility] facility, it is important to learn how to keep safe from harming yourself.    Recognize the warning signs:  · Abrupt changes in personality, positive or negative- including increase in energy   · Giving away possessions  · Change in eating patterns- significant weight changes-  positive or negative  · Change in sleeping patterns- unable to sleep or sleeping all the time   · Unwillingness or inability to communicate  · Depression  · Unusual sadness, discouragement and loneliness  · Talk of wanting to die  · Neglect of personal appearance   · Rebelliousness- reckless behavior  · Withdrawal from people/activities they love  · Confusion- inability to concentrate     If you or a loved one observes any of these behaviors or has concerns about self-harm, here's what you can do:  · Talk about it- your feelings and reasons for harming yourself  · Remove any means that you might use to hurt yourself (examples: pills, rope, extension cords, firearm)  · Get professional help from the community (Mental Health, Substance Abuse, psychological counseling)  · Do not be  alone:Call your Safe Contact- someone whom you trust who will be there for you.  · Call your local CRISIS HOTLINE 317-3910 or 416-519-2518  · Call your local Children's Mobile Crisis Response Team [place] (329) 352-2051 or www.[facility]  · Call the toll free National Suicide Prevention Hotlines   · National Suicide Prevention Lifeline 938-330-QSDH (9048)  · National [facility] Line Network 800-SUICIDE (436-1075)

## 2018-11-28 NOTE — PROGRESS NOTES
PIV removed, gauze and tape applied. Provided d/c instructions to patient, all questions answered at this time. Patient left floor walking with  and all belongings.

## 2018-11-28 NOTE — DISCHARGE SUMMARY
"Discharge Summary    CHIEF COMPLAINT ON ADMISSION  Chief Complaint   Patient presents with   • Blurred Vision     x 2 weeks.   • Polydipsia     \"c/o im constantly thirsty\".  also c/o nausea.   • Weight Loss     x 2 weeks.   • Fatigue     x 2 weeks. able to ambulate w/steady gait.   • Muscle Pain     all my muscles are hurting.       Reason for Admission  DEHYDRATION     Admission Date  11/26/2018    CODE STATUS  Full Code    HPI & HOSPITAL COURSE  This is a Previously healthy 38 y.o. female who presented 11/26/2018 with the above chief complaints who presented to the emergency room is found to have new onset diabetes with blood sugars in the 500s.  She had minimal anion gap and mildly elevated beta hydroxybutyrate but normal CO2.  She has been having blurred vision, feels like she has had dry skin as well as hair loss she was initially concerned of her thyroid or possible cancer.  Is also been complaining some vaginal itching with concern of yeast infection.  She has had polydipsia along with polyuria. She had lactose fermenting gram negative gregory growing on her urine culture, but due to need to work the following day, was unable to wait for urine culture results.  She was sent home with 5 days total of Rocephin x2 doses/omnicef as well as diflucan for candidal vaginal infection x 3 doses (1 refill). 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  I spoke with her and her  at bedside about the insulin, changing her diet and to keep glucose tabs at hand in case of a hypoglycemic event.  This patient most likely has type 2 DM since her symptoms were likely long standing, but not recognized.  She stated she was drinking large amounts of sugar sodas daily due to her recent move from [facility].  She understands this likely accelerated her diabetes mellitus and will switch to water.  Her Hga1c was 14.3%.  " She was given hand written prescriptions for her Humolog qwik pens with sliding scale insulin, nitza pen needles, 3/10 cc syringes, fast clix lancets, glucometer strips per diabetic educator note.  She has been given an Accucheck Guide glucometer by the diabetic educator.   She will need to call for urine culture results tomorrow.       Therefore, she is discharged in good and stable condition to home with close outpatient follow-up.    The patient recovered much more quickly than anticipated on admission.    Discharge Date  11/27/2018    FOLLOW UP ITEMS POST DISCHARGE  New PCP set up prior to dc.    DISCHARGE DIAGNOSES  Principal Problem:    Diabetes mellitus, new onset (HCC) POA: Yes  Active Problems:    Acute cystitis without hematuria POA: Yes    Vaginal yeast infection POA: Yes  Resolved Problems:    Hyponatremia POA: Yes      FOLLOW UP  No future appointments.  No follow-up provider specified.    MEDICATIONS ON DISCHARGE     Medication List      START taking these medications      Instructions   cefdinir 300 MG Caps  Start taking on:  11/28/2018  Commonly known as:  OMNICEF   Take 1 Cap by mouth 2 times a day for 3 days.  Dose:  300 mg     fluconazole 100 MG Tabs  Start taking on:  11/28/2018  Commonly known as:  DIFLUCAN   Take 1 Tab by mouth every day for 2 days.  Dose:  100 mg     insulin  UNIT/ML Susp  Commonly known as:  HUMULIN,NOVOLIN   Inject 10 Units as instructed 2 Times a Day.  Dose:  10 Units     metFORMIN 500 MG Tabs  Commonly known as:  GLUCOPHAGE   Take 1 Tab by mouth 2 times a day, with meals.  Dose:  500 mg            Allergies  Allergies   Allergen Reactions   • Bloodless        DIET  Orders Placed This Encounter   Procedures   • Diet Order Diabetic     Standing Status:   Standing     Number of Occurrences:   1     Order Specific Question:   Diet:     Answer:   Diabetic [3]       ACTIVITY  As tolerated.  Weight bearing as tolerated    CONSULTATIONS  Diabetic  educator    PROCEDURES  none    LABORATORY  Lab Results   Component Value Date    SODIUM 133 (L) 11/27/2018    POTASSIUM 3.4 (L) 11/27/2018    CHLORIDE 105 11/27/2018    CO2 22 11/27/2018    GLUCOSE 239 (H) 11/27/2018    BUN 17 11/27/2018    CREATININE 0.77 11/27/2018        Lab Results   Component Value Date    WBC 9.8 11/27/2018    HEMOGLOBIN 13.1 11/27/2018    HEMATOCRIT 39.0 11/27/2018    PLATELETCT 280 11/27/2018        Total time of the discharge process exceeds 45 minutes.

## 2018-11-28 NOTE — CONSULTS
Diabetes education: Met with patient and  this evening. Pt is newly dx with diabetes. Admission blood sugar was 570 and Hg A1c was 14.3%. Pt works as an MA, and is anxious to get back to work.  Discussed what diabetes was, the difference between type one and type two, hypoglycemia,  hyperglycemia, goals for blood sugars . Discussed need for carbohydrates and proteins, with every meal and snack as well as why. Discussed the importance of the HS snack with a carbohydrate and protein. Discussed need, benefit and precautions with exercise.  Discussed  what effects blood sugars. Discussed need to obtain and  follow up with a PCP. Insulin was discussed as well, insulin storage, shelf life and site rotation. Pt  practiced with saline pens, as well as syringes. 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  Pt states she has done finger sticks before. Pt given an Accucheck Guide with coupon to decrease cost of strips. Pt instructed on it's use and finger sticks reviewed.  Handouts given to cover areas discussed.  Pt will not have her insurance ( [facility]) start until December 1st. Recommended she use Humalog kwik pens ( coupon for 5 pens given at no cost), and possibly either use sample long acting pen from [facility] or have  pay for vial of Levemir  (  $ 7.00 for ). Pt is anxious to go home tonight and go back to work tomorrow. Getting  coverage or samples from  not possible. If pt does go today she must go home on NPH (written as Novolin/Humulin or Reli-On so she can get it for $26 a vial) vial .   Prescriptions should be: NPH vial, Humalog kwik pen (box of five) with sliding scale written out and for ac only, nitza pen needles ( box of 100), 3/10 cc syringes ( box of 100 6 mm), test strips for Accucheck Guide ( to access coupon) to test 3 times a day and fast clix lancets to test 3 times a day.

## 2019-01-08 ENCOUNTER — OFFICE VISIT (OUTPATIENT)
Dept: URGENT CARE | Facility: CLINIC | Age: 39
End: 2019-01-08
Payer: COMMERCIAL

## 2019-01-08 ENCOUNTER — HOSPITAL ENCOUNTER (OUTPATIENT)
Facility: MEDICAL CENTER | Age: 39
End: 2019-01-08
Attending: PHYSICIAN ASSISTANT
Payer: COMMERCIAL

## 2019-01-08 VITALS
HEIGHT: 63 IN | WEIGHT: 150 LBS | BODY MASS INDEX: 26.58 KG/M2 | DIASTOLIC BLOOD PRESSURE: 62 MMHG | RESPIRATION RATE: 16 BRPM | SYSTOLIC BLOOD PRESSURE: 114 MMHG | OXYGEN SATURATION: 99 % | TEMPERATURE: 97.4 F | HEART RATE: 100 BPM

## 2019-01-08 DIAGNOSIS — B37.31 YEAST VAGINITIS: ICD-10-CM

## 2019-01-08 DIAGNOSIS — E11.9 DIABETES MELLITUS, NEW ONSET (HCC): ICD-10-CM

## 2019-01-08 PROCEDURE — 99214 OFFICE O/P EST MOD 30 MIN: CPT | Performed by: PHYSICIAN ASSISTANT

## 2019-01-08 PROCEDURE — 87510 GARDNER VAG DNA DIR PROBE: CPT

## 2019-01-08 PROCEDURE — 87660 TRICHOMONAS VAGIN DIR PROBE: CPT

## 2019-01-08 PROCEDURE — 87480 CANDIDA DNA DIR PROBE: CPT

## 2019-01-08 RX ORDER — FLUCONAZOLE 150 MG/1
150 TABLET ORAL
Qty: 3 TAB | Refills: 0 | Status: SHIPPED | OUTPATIENT
Start: 2019-01-08 | End: 2019-01-15

## 2019-01-08 ASSESSMENT — ENCOUNTER SYMPTOMS
FLANK PAIN: 0
SHORTNESS OF BREATH: 0
ABDOMINAL PAIN: 0
PALPITATIONS: 0
DIARRHEA: 0
BLURRED VISION: 0
FEVER: 0
DIZZINESS: 0
VAGINITIS: 1
NAUSEA: 0
CHILLS: 0

## 2019-01-09 LAB
CANDIDA DNA VAG QL PROBE+SIG AMP: POSITIVE
G VAGINALIS DNA VAG QL PROBE+SIG AMP: POSITIVE
T VAGINALIS DNA VAG QL PROBE+SIG AMP: NEGATIVE

## 2019-01-09 NOTE — PROGRESS NOTES
Subjective:      Tonya Bro is a 38 y.o. female who presents with Vaginitis (x3 weeks chronic yeast infection, recently took cefidinr, hx of type 2 diabetes)      Vaginitis   The patient's primary symptoms include genital itching and vaginal discharge. This is a recurrent problem. The current episode started more than 1 month ago (She was initially treated for this after being admitted to the hospital at the end of November). The problem occurs constantly. The problem has been gradually worsening. The patient is experiencing no pain. The problem affects both sides. She is not pregnant. Associated symptoms include dysuria and painful intercourse. Pertinent negatives include no abdominal pain, chills, diarrhea, fever, flank pain, frequency, nausea or urgency. The vaginal discharge was white and malodorous (Discharge is white and clumpy). There has been no bleeding. She has not been passing clots. She has not been passing tissue. The symptoms are aggravated by intercourse and urinating. She has tried antibiotics and antifungals for the symptoms. The treatment provided no relief. She is sexually active. No, her partner does not have an STD. She uses nothing for contraception. Her menstrual history has been regular.   Patient recently diagnosed with type 2 diabetes approximately 1 month ago.  She has been taking metformin and insulin as prescribed but has yet to establish care with a PCP for follow-up.  She states that same time she was diagnosed with diabetes she was treated for yeast infection 2 doses of 100 mg fluconazole.  She states that her symptoms improved very mildly for a short period of time but then came right back.  She said she is just keeps getting worse and worse to the point where she is no longer able to have sexual intercourse with her  because it hurts so much.      Review of Systems   Constitutional: Negative for chills, fever and malaise/fatigue.   Eyes: Negative for blurred  "vision.   Respiratory: Negative for shortness of breath.    Cardiovascular: Negative for chest pain and palpitations.   Gastrointestinal: Negative for abdominal pain, diarrhea and nausea.   Genitourinary: Positive for dysuria and vaginal discharge. Negative for flank pain, frequency and urgency.        Abnormal vaginal discharge, vaginal itching   Neurological: Negative for dizziness.       PMH:  has a past medical history of Diabetes ([facility]) and Vaginal yeast infection (11/26/2018). She also has no past medical history of Anginal syndrome ([facility]); Arrhythmia; Arthritis; Asthma; At risk for sleep apnea; Back pain; Blood clotting disorder (HCC); Bronchitis; Cancer (HCC); Cataract; Congestive heart failure (HCC); COPD (chronic obstructive pulmonary disease) ([facility]); Dialysis patient ([facility]); Disorder of thyroid; Fall; Glaucoma; Heart murmur; Heart valve disease; Hemorrhagic disorder (HCC); Hypertension; Indigestion; Jaundice; Myocardial infarct ([facility]); Pacemaker; Pneumonia; Psychiatric problem; Renal disorder; Rheumatic fever; Seizure ([facility]); Stroke ([facility]); or Urinary incontinence.  MEDS:   Current Outpatient Prescriptions:   •  fluconazole (DIFLUCAN) 150 MG tablet, Take 1 Tab by mouth every 3 days for 3 doses., Disp: 3 Tab, Rfl: 0  •  metFORMIN (GLUCOPHAGE) 500 MG Tab, Take 1 Tab by mouth 2 times a day, with meals., Disp: 60 Tab, Rfl: 2  •  insulin NPH (HUMULIN,NOVOLIN) 100 UNIT/ML Suspension, Inject 10 Units as instructed 2 Times a Day., Disp: 1 Vial, Rfl: 2  ALLERGIES:   Allergies   Allergen Reactions   • Bloodless      SURGHX: History reviewed. No pertinent surgical history.  SOCHX:  reports that she has quit smoking. She has never used smokeless tobacco. She reports that she does not drink alcohol or use drugs.  FH: Family history was reviewed, no pertinent findings to report     Objective:     /62   Pulse 100   Temp 36.3 °C (97.4 °F) (Temporal)   Resp 16   Ht 1.6 m (5' 3\")   Wt 68 kg (150 lb)   SpO2 99%   BMI " 26.57 kg/m²      Physical Exam   Constitutional: She is oriented to person, place, and time. She appears well-developed and well-nourished.   HENT:   Head: Normocephalic and atraumatic.   Right Ear: External ear normal.   Left Ear: External ear normal.   Eyes: Pupils are equal, round, and reactive to light. Conjunctivae are normal.   Neck: Normal range of motion.   Cardiovascular: Normal rate, regular rhythm and normal heart sounds.    No murmur heard.  Pulmonary/Chest: Effort normal and breath sounds normal. She has no wheezes.   Lymphadenopathy:     She has no cervical adenopathy.   Neurological: She is alert and oriented to person, place, and time.   Skin: Skin is warm and dry. Capillary refill takes less than 2 seconds.   Psychiatric: She has a normal mood and affect. Her behavior is normal. Judgment normal.   *Patient declined  exam, she did a self swab for vaginal pathogens     Assessment/Plan:     1. Yeast vaginitis  - VAGINAL PATHOGENS DNA PANEL; Future  - fluconazole (DIFLUCAN) 150 MG tablet; Take 1 Tab by mouth every 3 days for 3 doses.  Dispense: 3 Tab; Refill: 0  - REFERRAL TO FOLLOW-UP WITH PRIMARY CARE    2. Diabetes mellitus, new onset (HCC)  - REFERRAL TO FOLLOW-UP WITH PRIMARY CARE      Differential Diagnosis, natural history, and supportive care discussed. Return to the Urgent Care or follow up with your PCP if symptoms fail to resolve, or for any new or worsening symptoms. Emergency room precautions discussed. Patient and/or family appears understanding of information.

## 2019-01-11 DIAGNOSIS — B96.89 BACTERIAL VAGINOSIS: ICD-10-CM

## 2019-01-11 DIAGNOSIS — N76.0 BACTERIAL VAGINOSIS: ICD-10-CM

## 2019-01-11 RX ORDER — METRONIDAZOLE 500 MG/1
500 TABLET ORAL 2 TIMES DAILY
Qty: 14 TAB | Refills: 0 | Status: SHIPPED | OUTPATIENT
Start: 2019-01-11 | End: 2019-01-18

## 2019-01-11 NOTE — PROGRESS NOTES
Called patient to notify of her positive BV results. Sending metronidazole to her pharmacy. Advised her that she should not drink alcohol while on this medication.

## 2019-12-10 ENCOUNTER — HOSPITAL ENCOUNTER (INPATIENT)
Facility: MEDICAL CENTER | Age: 39
LOS: 7 days | DRG: 854 | End: 2019-12-18
Attending: EMERGENCY MEDICINE | Admitting: HOSPITALIST
Payer: COMMERCIAL

## 2019-12-10 ENCOUNTER — APPOINTMENT (OUTPATIENT)
Dept: RADIOLOGY | Facility: MEDICAL CENTER | Age: 39
DRG: 854 | End: 2019-12-10
Attending: EMERGENCY MEDICINE
Payer: COMMERCIAL

## 2019-12-10 PROCEDURE — 86140 C-REACTIVE PROTEIN: CPT

## 2019-12-10 PROCEDURE — 84703 CHORIONIC GONADOTROPIN ASSAY: CPT

## 2019-12-10 PROCEDURE — 80048 BASIC METABOLIC PNL TOTAL CA: CPT

## 2019-12-10 PROCEDURE — 99285 EMERGENCY DEPT VISIT HI MDM: CPT

## 2019-12-10 PROCEDURE — A9270 NON-COVERED ITEM OR SERVICE: HCPCS | Performed by: EMERGENCY MEDICINE

## 2019-12-10 PROCEDURE — 700111 HCHG RX REV CODE 636 W/ 250 OVERRIDE (IP): Performed by: EMERGENCY MEDICINE

## 2019-12-10 PROCEDURE — 85652 RBC SED RATE AUTOMATED: CPT

## 2019-12-10 PROCEDURE — 96375 TX/PRO/DX INJ NEW DRUG ADDON: CPT

## 2019-12-10 PROCEDURE — 82550 ASSAY OF CK (CPK): CPT

## 2019-12-10 PROCEDURE — 85025 COMPLETE CBC W/AUTO DIFF WBC: CPT

## 2019-12-10 PROCEDURE — 700102 HCHG RX REV CODE 250 W/ 637 OVERRIDE(OP): Performed by: EMERGENCY MEDICINE

## 2019-12-10 PROCEDURE — 85610 PROTHROMBIN TIME: CPT

## 2019-12-10 RX ORDER — HYDROCODONE BITARTRATE AND ACETAMINOPHEN 10; 325 MG/1; MG/1
1 TABLET ORAL ONCE
Status: COMPLETED | OUTPATIENT
Start: 2019-12-10 | End: 2019-12-10

## 2019-12-10 RX ORDER — KETOROLAC TROMETHAMINE 30 MG/ML
15 INJECTION, SOLUTION INTRAMUSCULAR; INTRAVENOUS ONCE
Status: COMPLETED | OUTPATIENT
Start: 2019-12-10 | End: 2019-12-10

## 2019-12-10 RX ADMIN — KETOROLAC TROMETHAMINE 15 MG: 30 INJECTION, SOLUTION INTRAMUSCULAR at 23:15

## 2019-12-10 RX ADMIN — HYDROCODONE BITARTRATE AND ACETAMINOPHEN 1 TABLET: 10; 325 TABLET ORAL at 23:15

## 2019-12-11 ENCOUNTER — ANESTHESIA (OUTPATIENT)
Dept: SURGERY | Facility: MEDICAL CENTER | Age: 39
DRG: 854 | End: 2019-12-11
Payer: COMMERCIAL

## 2019-12-11 ENCOUNTER — ANESTHESIA EVENT (OUTPATIENT)
Dept: SURGERY | Facility: MEDICAL CENTER | Age: 39
DRG: 854 | End: 2019-12-11
Payer: COMMERCIAL

## 2019-12-11 PROBLEM — M86.051: Status: ACTIVE | Noted: 2019-12-11

## 2019-12-11 PROBLEM — E11.65 TYPE 2 DIABETES MELLITUS WITH HYPERGLYCEMIA, WITH LONG-TERM CURRENT USE OF INSULIN (HCC): Status: ACTIVE | Noted: 2019-12-11

## 2019-12-11 PROBLEM — A41.9 SEPSIS WITHOUT ACUTE ORGAN DYSFUNCTION (HCC): Status: ACTIVE | Noted: 2019-12-11

## 2019-12-11 PROBLEM — Z79.4 TYPE 2 DIABETES MELLITUS WITH HYPERGLYCEMIA, WITH LONG-TERM CURRENT USE OF INSULIN (HCC): Status: ACTIVE | Noted: 2019-12-11

## 2019-12-11 LAB
ANION GAP SERPL CALC-SCNC: 13 MMOL/L (ref 0–11.9)
BASOPHILS # BLD AUTO: 0.4 % (ref 0–1.8)
BASOPHILS # BLD: 0.05 K/UL (ref 0–0.12)
BUN SERPL-MCNC: 15 MG/DL (ref 8–22)
CALCIUM SERPL-MCNC: 9.6 MG/DL (ref 8.5–10.5)
CHLORIDE SERPL-SCNC: 100 MMOL/L (ref 96–112)
CK SERPL-CCNC: <10 U/L (ref 0–154)
CO2 SERPL-SCNC: 23 MMOL/L (ref 20–33)
CREAT SERPL-MCNC: 0.76 MG/DL (ref 0.5–1.4)
CRP SERPL HS-MCNC: 22.58 MG/DL (ref 0–0.75)
EOSINOPHIL # BLD AUTO: 0.03 K/UL (ref 0–0.51)
EOSINOPHIL NFR BLD: 0.2 % (ref 0–6.9)
ERYTHROCYTE [DISTWIDTH] IN BLOOD BY AUTOMATED COUNT: 41.1 FL (ref 35.9–50)
ERYTHROCYTE [SEDIMENTATION RATE] IN BLOOD BY WESTERGREN METHOD: 76 MM/HOUR (ref 0–20)
EST. AVERAGE GLUCOSE BLD GHB EST-MCNC: 249 MG/DL
GLUCOSE BLD-MCNC: 274 MG/DL (ref 65–99)
GLUCOSE BLD-MCNC: 284 MG/DL (ref 65–99)
GLUCOSE BLD-MCNC: 333 MG/DL (ref 65–99)
GLUCOSE BLD-MCNC: 516 MG/DL (ref 65–99)
GLUCOSE BLD-MCNC: 571 MG/DL (ref 65–99)
GLUCOSE BLD-MCNC: 587 MG/DL (ref 65–99)
GLUCOSE SERPL-MCNC: 166 MG/DL (ref 65–99)
GRAM STN SPEC: NORMAL
HBA1C MFR BLD: 10.3 % (ref 0–5.6)
HCG SERPL QL: NEGATIVE
HCT VFR BLD AUTO: 45.9 % (ref 37–47)
HGB BLD-MCNC: 14.9 G/DL (ref 12–16)
IMM GRANULOCYTES # BLD AUTO: 0.04 K/UL (ref 0–0.11)
IMM GRANULOCYTES NFR BLD AUTO: 0.3 % (ref 0–0.9)
INR PPP: 0.99 (ref 0.87–1.13)
LACTATE BLD-SCNC: 1.1 MMOL/L (ref 0.5–2)
LACTATE BLD-SCNC: 1.4 MMOL/L (ref 0.5–2)
LYMPHOCYTES # BLD AUTO: 1.74 K/UL (ref 1–4.8)
LYMPHOCYTES NFR BLD: 12.9 % (ref 22–41)
MCH RBC QN AUTO: 27.9 PG (ref 27–33)
MCHC RBC AUTO-ENTMCNC: 32.5 G/DL (ref 33.6–35)
MCV RBC AUTO: 86 FL (ref 81.4–97.8)
MONOCYTES # BLD AUTO: 0.91 K/UL (ref 0–0.85)
MONOCYTES NFR BLD AUTO: 6.7 % (ref 0–13.4)
NEUTROPHILS # BLD AUTO: 10.73 K/UL (ref 2–7.15)
NEUTROPHILS NFR BLD: 79.5 % (ref 44–72)
NRBC # BLD AUTO: 0 K/UL
NRBC BLD-RTO: 0 /100 WBC
PLATELET # BLD AUTO: 510 K/UL (ref 164–446)
PMV BLD AUTO: 9.6 FL (ref 9–12.9)
POTASSIUM SERPL-SCNC: 3.6 MMOL/L (ref 3.6–5.5)
PROTHROMBIN TIME: 13.3 SEC (ref 12–14.6)
RBC # BLD AUTO: 5.34 M/UL (ref 4.2–5.4)
RHODAMINE-AURAMINE STN SPEC: NORMAL
SIGNIFICANT IND 70042: NORMAL
SITE SITE: NORMAL
SODIUM SERPL-SCNC: 136 MMOL/L (ref 135–145)
SOURCE SOURCE: NORMAL
WBC # BLD AUTO: 13.5 K/UL (ref 4.8–10.8)

## 2019-12-11 PROCEDURE — 83036 HEMOGLOBIN GLYCOSYLATED A1C: CPT

## 2019-12-11 PROCEDURE — 700111 HCHG RX REV CODE 636 W/ 250 OVERRIDE (IP): Performed by: ANESTHESIOLOGY

## 2019-12-11 PROCEDURE — 700111 HCHG RX REV CODE 636 W/ 250 OVERRIDE (IP): Performed by: HOSPITALIST

## 2019-12-11 PROCEDURE — 83605 ASSAY OF LACTIC ACID: CPT

## 2019-12-11 PROCEDURE — 160009 HCHG ANES TIME/MIN: Performed by: ORTHOPAEDIC SURGERY

## 2019-12-11 PROCEDURE — 73701 CT LOWER EXTREMITY W/DYE: CPT | Mod: RT

## 2019-12-11 PROCEDURE — 87015 SPECIMEN INFECT AGNT CONCNTJ: CPT

## 2019-12-11 PROCEDURE — 160038 HCHG SURGERY MINUTES - EA ADDL 1 MIN LEVEL 2: Performed by: ORTHOPAEDIC SURGERY

## 2019-12-11 PROCEDURE — 87075 CULTR BACTERIA EXCEPT BLOOD: CPT | Mod: 91

## 2019-12-11 PROCEDURE — 80048 BASIC METABOLIC PNL TOTAL CA: CPT

## 2019-12-11 PROCEDURE — 700111 HCHG RX REV CODE 636 W/ 250 OVERRIDE (IP): Performed by: INTERNAL MEDICINE

## 2019-12-11 PROCEDURE — 700105 HCHG RX REV CODE 258: Performed by: EMERGENCY MEDICINE

## 2019-12-11 PROCEDURE — 700102 HCHG RX REV CODE 250 W/ 637 OVERRIDE(OP): Performed by: HOSPITALIST

## 2019-12-11 PROCEDURE — 700105 HCHG RX REV CODE 258

## 2019-12-11 PROCEDURE — 160035 HCHG PACU - 1ST 60 MINS PHASE I: Performed by: ORTHOPAEDIC SURGERY

## 2019-12-11 PROCEDURE — 87205 SMEAR GRAM STAIN: CPT | Mod: 91

## 2019-12-11 PROCEDURE — 700111 HCHG RX REV CODE 636 W/ 250 OVERRIDE (IP): Performed by: EMERGENCY MEDICINE

## 2019-12-11 PROCEDURE — 700101 HCHG RX REV CODE 250: Performed by: ANESTHESIOLOGY

## 2019-12-11 PROCEDURE — 87186 SC STD MICRODIL/AGAR DIL: CPT

## 2019-12-11 PROCEDURE — 700105 HCHG RX REV CODE 258: Performed by: INTERNAL MEDICINE

## 2019-12-11 PROCEDURE — 160002 HCHG RECOVERY MINUTES (STAT): Performed by: ORTHOPAEDIC SURGERY

## 2019-12-11 PROCEDURE — 36415 COLL VENOUS BLD VENIPUNCTURE: CPT

## 2019-12-11 PROCEDURE — 87070 CULTURE OTHR SPECIMN AEROBIC: CPT | Mod: 91

## 2019-12-11 PROCEDURE — A9270 NON-COVERED ITEM OR SERVICE: HCPCS | Performed by: HOSPITALIST

## 2019-12-11 PROCEDURE — 85025 COMPLETE CBC W/AUTO DIFF WBC: CPT

## 2019-12-11 PROCEDURE — 501330 HCHG SET, CYSTO IRRIG TUBING: Performed by: ORTHOPAEDIC SURGERY

## 2019-12-11 PROCEDURE — A6402 STERILE GAUZE <= 16 SQ IN: HCPCS | Performed by: ORTHOPAEDIC SURGERY

## 2019-12-11 PROCEDURE — 87116 MYCOBACTERIA CULTURE: CPT | Mod: 91

## 2019-12-11 PROCEDURE — 87206 SMEAR FLUORESCENT/ACID STAI: CPT

## 2019-12-11 PROCEDURE — 96366 THER/PROPH/DIAG IV INF ADDON: CPT

## 2019-12-11 PROCEDURE — 99223 1ST HOSP IP/OBS HIGH 75: CPT | Performed by: HOSPITALIST

## 2019-12-11 PROCEDURE — 501838 HCHG SUTURE GENERAL: Performed by: ORTHOPAEDIC SURGERY

## 2019-12-11 PROCEDURE — 160036 HCHG PACU - EA ADDL 30 MINS PHASE I: Performed by: ORTHOPAEDIC SURGERY

## 2019-12-11 PROCEDURE — 500881 HCHG PACK, EXTREMITY: Performed by: ORTHOPAEDIC SURGERY

## 2019-12-11 PROCEDURE — 0QB60ZZ EXCISION OF RIGHT UPPER FEMUR, OPEN APPROACH: ICD-10-PCS | Performed by: ORTHOPAEDIC SURGERY

## 2019-12-11 PROCEDURE — 700102 HCHG RX REV CODE 250 W/ 637 OVERRIDE(OP): Performed by: INTERNAL MEDICINE

## 2019-12-11 PROCEDURE — 160048 HCHG OR STATISTICAL LEVEL 1-5: Performed by: ORTHOPAEDIC SURGERY

## 2019-12-11 PROCEDURE — 700102 HCHG RX REV CODE 250 W/ 637 OVERRIDE(OP): Performed by: ANESTHESIOLOGY

## 2019-12-11 PROCEDURE — 770006 HCHG ROOM/CARE - MED/SURG/GYN SEMI*

## 2019-12-11 PROCEDURE — A9270 NON-COVERED ITEM OR SERVICE: HCPCS | Performed by: ANESTHESIOLOGY

## 2019-12-11 PROCEDURE — 96365 THER/PROPH/DIAG IV INF INIT: CPT

## 2019-12-11 PROCEDURE — 82962 GLUCOSE BLOOD TEST: CPT

## 2019-12-11 PROCEDURE — 160027 HCHG SURGERY MINUTES - 1ST 30 MINS LEVEL 2: Performed by: ORTHOPAEDIC SURGERY

## 2019-12-11 PROCEDURE — 87077 CULTURE AEROBIC IDENTIFY: CPT

## 2019-12-11 PROCEDURE — 700105 HCHG RX REV CODE 258: Performed by: HOSPITALIST

## 2019-12-11 PROCEDURE — 87102 FUNGUS ISOLATION CULTURE: CPT | Mod: 91

## 2019-12-11 PROCEDURE — 87040 BLOOD CULTURE FOR BACTERIA: CPT | Mod: 91

## 2019-12-11 PROCEDURE — 700117 HCHG RX CONTRAST REV CODE 255: Performed by: EMERGENCY MEDICINE

## 2019-12-11 PROCEDURE — 96368 THER/DIAG CONCURRENT INF: CPT

## 2019-12-11 RX ORDER — OXYCODONE HYDROCHLORIDE 5 MG/1
2.5 TABLET ORAL
Status: DISCONTINUED | OUTPATIENT
Start: 2019-12-11 | End: 2019-12-12

## 2019-12-11 RX ORDER — PROMETHAZINE HYDROCHLORIDE 25 MG/1
12.5-25 SUPPOSITORY RECTAL EVERY 4 HOURS PRN
Status: DISCONTINUED | OUTPATIENT
Start: 2019-12-11 | End: 2019-12-18 | Stop reason: HOSPADM

## 2019-12-11 RX ORDER — SODIUM CHLORIDE, SODIUM LACTATE, POTASSIUM CHLORIDE, CALCIUM CHLORIDE 600; 310; 30; 20 MG/100ML; MG/100ML; MG/100ML; MG/100ML
INJECTION, SOLUTION INTRAVENOUS CONTINUOUS
Status: DISCONTINUED | OUTPATIENT
Start: 2019-12-11 | End: 2019-12-11 | Stop reason: HOSPADM

## 2019-12-11 RX ORDER — OXYCODONE HYDROCHLORIDE 5 MG/1
5 TABLET ORAL
Status: DISCONTINUED | OUTPATIENT
Start: 2019-12-11 | End: 2019-12-12

## 2019-12-11 RX ORDER — GABAPENTIN 300 MG/1
600 CAPSULE ORAL ONCE
Status: COMPLETED | OUTPATIENT
Start: 2019-12-11 | End: 2019-12-11

## 2019-12-11 RX ORDER — POLYETHYLENE GLYCOL 3350 17 G/17G
1 POWDER, FOR SOLUTION ORAL
Status: DISCONTINUED | OUTPATIENT
Start: 2019-12-11 | End: 2019-12-18 | Stop reason: HOSPADM

## 2019-12-11 RX ORDER — HYDROMORPHONE HYDROCHLORIDE 1 MG/ML
0.1 INJECTION, SOLUTION INTRAMUSCULAR; INTRAVENOUS; SUBCUTANEOUS
Status: DISCONTINUED | OUTPATIENT
Start: 2019-12-11 | End: 2019-12-11 | Stop reason: HOSPADM

## 2019-12-11 RX ORDER — ONDANSETRON 2 MG/ML
4 INJECTION INTRAMUSCULAR; INTRAVENOUS
Status: COMPLETED | OUTPATIENT
Start: 2019-12-11 | End: 2019-12-11

## 2019-12-11 RX ORDER — HALOPERIDOL 5 MG/ML
1 INJECTION INTRAMUSCULAR
Status: DISCONTINUED | OUTPATIENT
Start: 2019-12-11 | End: 2019-12-11 | Stop reason: HOSPADM

## 2019-12-11 RX ORDER — HYDROMORPHONE HYDROCHLORIDE 1 MG/ML
0.2 INJECTION, SOLUTION INTRAMUSCULAR; INTRAVENOUS; SUBCUTANEOUS
Status: DISCONTINUED | OUTPATIENT
Start: 2019-12-11 | End: 2019-12-11 | Stop reason: HOSPADM

## 2019-12-11 RX ORDER — MORPHINE SULFATE 4 MG/ML
2 INJECTION, SOLUTION INTRAMUSCULAR; INTRAVENOUS
Status: DISCONTINUED | OUTPATIENT
Start: 2019-12-11 | End: 2019-12-12

## 2019-12-11 RX ORDER — LABETALOL HYDROCHLORIDE 5 MG/ML
5 INJECTION, SOLUTION INTRAVENOUS
Status: DISCONTINUED | OUTPATIENT
Start: 2019-12-11 | End: 2019-12-11 | Stop reason: HOSPADM

## 2019-12-11 RX ORDER — DEXAMETHASONE SODIUM PHOSPHATE 4 MG/ML
INJECTION, SOLUTION INTRA-ARTICULAR; INTRALESIONAL; INTRAMUSCULAR; INTRAVENOUS; SOFT TISSUE PRN
Status: DISCONTINUED | OUTPATIENT
Start: 2019-12-11 | End: 2019-12-11 | Stop reason: SURG

## 2019-12-11 RX ORDER — PROCHLORPERAZINE EDISYLATE 5 MG/ML
5-10 INJECTION INTRAMUSCULAR; INTRAVENOUS EVERY 4 HOURS PRN
Status: DISCONTINUED | OUTPATIENT
Start: 2019-12-11 | End: 2019-12-18 | Stop reason: HOSPADM

## 2019-12-11 RX ORDER — HYDROMORPHONE HYDROCHLORIDE 1 MG/ML
0.4 INJECTION, SOLUTION INTRAMUSCULAR; INTRAVENOUS; SUBCUTANEOUS
Status: DISCONTINUED | OUTPATIENT
Start: 2019-12-11 | End: 2019-12-11 | Stop reason: HOSPADM

## 2019-12-11 RX ORDER — HYDRALAZINE HYDROCHLORIDE 20 MG/ML
5 INJECTION INTRAMUSCULAR; INTRAVENOUS
Status: DISCONTINUED | OUTPATIENT
Start: 2019-12-11 | End: 2019-12-11 | Stop reason: HOSPADM

## 2019-12-11 RX ORDER — MIDAZOLAM HYDROCHLORIDE 1 MG/ML
INJECTION INTRAMUSCULAR; INTRAVENOUS PRN
Status: DISCONTINUED | OUTPATIENT
Start: 2019-12-11 | End: 2019-12-11 | Stop reason: SURG

## 2019-12-11 RX ORDER — ONDANSETRON 4 MG/1
4 TABLET, ORALLY DISINTEGRATING ORAL EVERY 4 HOURS PRN
Status: DISCONTINUED | OUTPATIENT
Start: 2019-12-11 | End: 2019-12-18 | Stop reason: HOSPADM

## 2019-12-11 RX ORDER — LORAZEPAM 2 MG/ML
0.5 INJECTION INTRAMUSCULAR
Status: DISCONTINUED | OUTPATIENT
Start: 2019-12-11 | End: 2019-12-11 | Stop reason: HOSPADM

## 2019-12-11 RX ORDER — SODIUM CHLORIDE, SODIUM LACTATE, POTASSIUM CHLORIDE, AND CALCIUM CHLORIDE .6; .31; .03; .02 G/100ML; G/100ML; G/100ML; G/100ML
500 INJECTION, SOLUTION INTRAVENOUS
Status: DISCONTINUED | OUTPATIENT
Start: 2019-12-11 | End: 2019-12-18 | Stop reason: HOSPADM

## 2019-12-11 RX ORDER — ROCURONIUM BROMIDE 10 MG/ML
INJECTION, SOLUTION INTRAVENOUS PRN
Status: DISCONTINUED | OUTPATIENT
Start: 2019-12-11 | End: 2019-12-11 | Stop reason: SURG

## 2019-12-11 RX ORDER — ACETAMINOPHEN 325 MG/1
650 TABLET ORAL EVERY 6 HOURS PRN
Status: DISCONTINUED | OUTPATIENT
Start: 2019-12-11 | End: 2019-12-16

## 2019-12-11 RX ORDER — OXYCODONE HCL 5 MG/5 ML
10 SOLUTION, ORAL ORAL
Status: COMPLETED | OUTPATIENT
Start: 2019-12-11 | End: 2019-12-11

## 2019-12-11 RX ORDER — KETOROLAC TROMETHAMINE 30 MG/ML
INJECTION, SOLUTION INTRAMUSCULAR; INTRAVENOUS PRN
Status: DISCONTINUED | OUTPATIENT
Start: 2019-12-11 | End: 2019-12-11 | Stop reason: SURG

## 2019-12-11 RX ORDER — MEPERIDINE HYDROCHLORIDE 25 MG/ML
25 INJECTION INTRAMUSCULAR; INTRAVENOUS; SUBCUTANEOUS
Status: DISCONTINUED | OUTPATIENT
Start: 2019-12-11 | End: 2019-12-11 | Stop reason: HOSPADM

## 2019-12-11 RX ORDER — SODIUM CHLORIDE 9 MG/ML
INJECTION, SOLUTION INTRAVENOUS
Status: COMPLETED
Start: 2019-12-11 | End: 2019-12-11

## 2019-12-11 RX ORDER — DIPHENHYDRAMINE HYDROCHLORIDE 50 MG/ML
12.5 INJECTION INTRAMUSCULAR; INTRAVENOUS
Status: DISCONTINUED | OUTPATIENT
Start: 2019-12-11 | End: 2019-12-11 | Stop reason: HOSPADM

## 2019-12-11 RX ORDER — LIDOCAINE HYDROCHLORIDE 20 MG/ML
INJECTION, SOLUTION EPIDURAL; INFILTRATION; INTRACAUDAL; PERINEURAL PRN
Status: DISCONTINUED | OUTPATIENT
Start: 2019-12-11 | End: 2019-12-11 | Stop reason: SURG

## 2019-12-11 RX ORDER — CLONIDINE HYDROCHLORIDE 0.1 MG/1
0.1 TABLET ORAL EVERY 6 HOURS PRN
Status: DISCONTINUED | OUTPATIENT
Start: 2019-12-11 | End: 2019-12-18 | Stop reason: HOSPADM

## 2019-12-11 RX ORDER — ACETAMINOPHEN 500 MG
1000 TABLET ORAL ONCE
Status: COMPLETED | OUTPATIENT
Start: 2019-12-11 | End: 2019-12-11

## 2019-12-11 RX ORDER — BISACODYL 10 MG
10 SUPPOSITORY, RECTAL RECTAL
Status: DISCONTINUED | OUTPATIENT
Start: 2019-12-11 | End: 2019-12-18 | Stop reason: HOSPADM

## 2019-12-11 RX ORDER — OXYCODONE HCL 5 MG/5 ML
5 SOLUTION, ORAL ORAL
Status: COMPLETED | OUTPATIENT
Start: 2019-12-11 | End: 2019-12-11

## 2019-12-11 RX ORDER — ONDANSETRON 2 MG/ML
4 INJECTION INTRAMUSCULAR; INTRAVENOUS EVERY 4 HOURS PRN
Status: DISCONTINUED | OUTPATIENT
Start: 2019-12-11 | End: 2019-12-18 | Stop reason: HOSPADM

## 2019-12-11 RX ORDER — PROMETHAZINE HYDROCHLORIDE 25 MG/1
12.5-25 TABLET ORAL EVERY 4 HOURS PRN
Status: DISCONTINUED | OUTPATIENT
Start: 2019-12-11 | End: 2019-12-18 | Stop reason: HOSPADM

## 2019-12-11 RX ORDER — SODIUM CHLORIDE, SODIUM LACTATE, POTASSIUM CHLORIDE, CALCIUM CHLORIDE 600; 310; 30; 20 MG/100ML; MG/100ML; MG/100ML; MG/100ML
INJECTION, SOLUTION INTRAVENOUS CONTINUOUS
Status: DISCONTINUED | OUTPATIENT
Start: 2019-12-11 | End: 2019-12-14

## 2019-12-11 RX ORDER — AMOXICILLIN 250 MG
2 CAPSULE ORAL 2 TIMES DAILY
Status: DISCONTINUED | OUTPATIENT
Start: 2019-12-11 | End: 2019-12-18 | Stop reason: HOSPADM

## 2019-12-11 RX ADMIN — INSULIN LISPRO 14 UNITS: 100 INJECTION, SOLUTION INTRAVENOUS; SUBCUTANEOUS at 21:57

## 2019-12-11 RX ADMIN — ROCURONIUM BROMIDE 30 MG: 10 INJECTION, SOLUTION INTRAVENOUS at 09:06

## 2019-12-11 RX ADMIN — INSULIN LISPRO 14 UNITS: 100 INJECTION, SOLUTION INTRAVENOUS; SUBCUTANEOUS at 21:18

## 2019-12-11 RX ADMIN — INSULIN HUMAN 10 UNITS: 100 INJECTION, SUSPENSION SUBCUTANEOUS at 18:07

## 2019-12-11 RX ADMIN — INSULIN HUMAN 10 UNITS: 100 INJECTION, SUSPENSION SUBCUTANEOUS at 23:36

## 2019-12-11 RX ADMIN — AMPICILLIN AND SULBACTAM 3 G: 2; 1 INJECTION, POWDER, FOR SOLUTION INTRAVENOUS at 01:48

## 2019-12-11 RX ADMIN — PROPOFOL 120 MG: 10 INJECTION, EMULSION INTRAVENOUS at 09:06

## 2019-12-11 RX ADMIN — KETOROLAC TROMETHAMINE 30 MG: 30 INJECTION, SOLUTION INTRAMUSCULAR at 10:12

## 2019-12-11 RX ADMIN — FENTANYL CITRATE 50 MCG: 0.05 INJECTION, SOLUTION INTRAMUSCULAR; INTRAVENOUS at 10:33

## 2019-12-11 RX ADMIN — SENNOSIDES AND DOCUSATE SODIUM 2 TABLET: 8.6; 5 TABLET ORAL at 18:13

## 2019-12-11 RX ADMIN — DEXAMETHASONE SODIUM PHOSPHATE 8 MG: 4 INJECTION, SOLUTION INTRA-ARTICULAR; INTRALESIONAL; INTRAMUSCULAR; INTRAVENOUS; SOFT TISSUE at 09:17

## 2019-12-11 RX ADMIN — INSULIN HUMAN 6 UNITS: 100 INJECTION, SOLUTION PARENTERAL at 18:08

## 2019-12-11 RX ADMIN — GABAPENTIN 600 MG: 300 CAPSULE ORAL at 08:48

## 2019-12-11 RX ADMIN — MORPHINE SULFATE 2 MG: 4 INJECTION INTRAVENOUS at 06:42

## 2019-12-11 RX ADMIN — INSULIN HUMAN 10 UNITS: 100 INJECTION, SUSPENSION SUBCUTANEOUS at 06:05

## 2019-12-11 RX ADMIN — AMPICILLIN SODIUM AND SULBACTAM SODIUM 3 G: 2; 1 INJECTION, POWDER, FOR SOLUTION INTRAMUSCULAR; INTRAVENOUS at 07:37

## 2019-12-11 RX ADMIN — ACETAMINOPHEN 650 MG: 325 TABLET, FILM COATED ORAL at 13:40

## 2019-12-11 RX ADMIN — FENTANYL CITRATE 50 MCG: 50 INJECTION, SOLUTION INTRAMUSCULAR; INTRAVENOUS at 09:50

## 2019-12-11 RX ADMIN — PROMETHAZINE HYDROCHLORIDE 25 MG: 25 TABLET ORAL at 12:55

## 2019-12-11 RX ADMIN — HYDROMORPHONE HYDROCHLORIDE 0.4 MG: 1 INJECTION, SOLUTION INTRAMUSCULAR; INTRAVENOUS; SUBCUTANEOUS at 10:46

## 2019-12-11 RX ADMIN — SUGAMMADEX 200 MG: 100 INJECTION, SOLUTION INTRAVENOUS at 10:11

## 2019-12-11 RX ADMIN — FENTANYL CITRATE 100 MCG: 50 INJECTION, SOLUTION INTRAMUSCULAR; INTRAVENOUS at 09:06

## 2019-12-11 RX ADMIN — HYDROMORPHONE HYDROCHLORIDE 0.4 MG: 1 INJECTION, SOLUTION INTRAMUSCULAR; INTRAVENOUS; SUBCUTANEOUS at 10:41

## 2019-12-11 RX ADMIN — HYDROMORPHONE HYDROCHLORIDE 0.2 MG: 1 INJECTION, SOLUTION INTRAMUSCULAR; INTRAVENOUS; SUBCUTANEOUS at 10:51

## 2019-12-11 RX ADMIN — HALOPERIDOL LACTATE 1 MG: 5 INJECTION, SOLUTION INTRAMUSCULAR at 10:56

## 2019-12-11 RX ADMIN — ONDANSETRON 4 MG: 2 INJECTION INTRAMUSCULAR; INTRAVENOUS at 11:03

## 2019-12-11 RX ADMIN — VANCOMYCIN HYDROCHLORIDE 1500 MG: 500 INJECTION, POWDER, LYOPHILIZED, FOR SOLUTION INTRAVENOUS at 01:30

## 2019-12-11 RX ADMIN — INSULIN HUMAN 3 UNITS: 100 INJECTION, SOLUTION PARENTERAL at 13:09

## 2019-12-11 RX ADMIN — LIDOCAINE HYDROCHLORIDE 50 MG: 20 INJECTION, SOLUTION EPIDURAL; INFILTRATION; INTRACAUDAL at 09:06

## 2019-12-11 RX ADMIN — IOHEXOL 100 ML: 350 INJECTION, SOLUTION INTRAVENOUS at 00:39

## 2019-12-11 RX ADMIN — ONDANSETRON 4 MG: 2 INJECTION INTRAMUSCULAR; INTRAVENOUS at 09:17

## 2019-12-11 RX ADMIN — AMPICILLIN SODIUM AND SULBACTAM SODIUM 3 G: 2; 1 INJECTION, POWDER, FOR SOLUTION INTRAMUSCULAR; INTRAVENOUS at 14:52

## 2019-12-11 RX ADMIN — VANCOMYCIN HYDROCHLORIDE 700 MG: 500 INJECTION, POWDER, LYOPHILIZED, FOR SOLUTION INTRAVENOUS at 21:17

## 2019-12-11 RX ADMIN — SODIUM CHLORIDE, POTASSIUM CHLORIDE, SODIUM LACTATE AND CALCIUM CHLORIDE: 600; 310; 30; 20 INJECTION, SOLUTION INTRAVENOUS at 06:42

## 2019-12-11 RX ADMIN — OXYCODONE HYDROCHLORIDE 10 MG: 5 SOLUTION ORAL at 10:30

## 2019-12-11 RX ADMIN — FENTANYL CITRATE 50 MCG: 0.05 INJECTION, SOLUTION INTRAMUSCULAR; INTRAVENOUS at 10:30

## 2019-12-11 RX ADMIN — VANCOMYCIN HYDROCHLORIDE 700 MG: 500 INJECTION, POWDER, LYOPHILIZED, FOR SOLUTION INTRAVENOUS at 12:55

## 2019-12-11 RX ADMIN — OXYCODONE HYDROCHLORIDE 5 MG: 5 TABLET ORAL at 13:40

## 2019-12-11 RX ADMIN — SODIUM CHLORIDE 500 ML: 9 INJECTION, SOLUTION INTRAVENOUS at 21:32

## 2019-12-11 RX ADMIN — ACETAMINOPHEN 1000 MG: 500 TABLET ORAL at 08:48

## 2019-12-11 RX ADMIN — MIDAZOLAM 2 MG: 1 INJECTION INTRAMUSCULAR; INTRAVENOUS at 08:58

## 2019-12-11 RX ADMIN — AMPICILLIN SODIUM AND SULBACTAM SODIUM 3 G: 2; 1 INJECTION, POWDER, FOR SOLUTION INTRAMUSCULAR; INTRAVENOUS at 21:17

## 2019-12-11 ASSESSMENT — COGNITIVE AND FUNCTIONAL STATUS - GENERAL
DAILY ACTIVITIY SCORE: 21
TOILETING: A LITTLE
SUGGESTED CMS G CODE MODIFIER DAILY ACTIVITY: CJ
SUGGESTED CMS G CODE MODIFIER MOBILITY: CJ
HELP NEEDED FOR BATHING: A LITTLE
MOVING FROM LYING ON BACK TO SITTING ON SIDE OF FLAT BED: A LITTLE
WALKING IN HOSPITAL ROOM: A LITTLE
MOBILITY SCORE: 20
STANDING UP FROM CHAIR USING ARMS: A LITTLE
CLIMB 3 TO 5 STEPS WITH RAILING: A LITTLE
DRESSING REGULAR LOWER BODY CLOTHING: A LITTLE

## 2019-12-11 ASSESSMENT — PAIN SCALES - GENERAL: PAIN_LEVEL: 3

## 2019-12-11 ASSESSMENT — LIFESTYLE VARIABLES
TOTAL SCORE: 0
TOTAL SCORE: 0
EVER FELT BAD OR GUILTY ABOUT YOUR DRINKING: NO
ALCOHOL_USE: NO
EVER HAD A DRINK FIRST THING IN THE MORNING TO STEADY YOUR NERVES TO GET RID OF A HANGOVER: NO
ON A TYPICAL DAY WHEN YOU DRINK ALCOHOL HOW MANY DRINKS DO YOU HAVE: 0
HOW MANY TIMES IN THE PAST YEAR HAVE YOU HAD 5 OR MORE DRINKS IN A DAY: 0
EVER_SMOKED: YES
HAVE PEOPLE ANNOYED YOU BY CRITICIZING YOUR DRINKING: NO
AVERAGE NUMBER OF DAYS PER WEEK YOU HAVE A DRINK CONTAINING ALCOHOL: 0
CONSUMPTION TOTAL: NEGATIVE
REASON UNABLE TO ASSESS: +
HAVE YOU EVER FELT YOU SHOULD CUT DOWN ON YOUR DRINKING: NO
TOTAL SCORE: 0

## 2019-12-11 ASSESSMENT — COPD QUESTIONNAIRES
DO YOU EVER COUGH UP ANY MUCUS OR PHLEGM?: NO/ONLY WITH OCCASIONAL COLDS OR INFECTIONS
IN THE PAST 12 MONTHS DO YOU DO LESS THAN YOU USED TO BECAUSE OF YOUR BREATHING PROBLEMS: DISAGREE/UNSURE
DURING THE PAST 4 WEEKS HOW MUCH DID YOU FEEL SHORT OF BREATH: NONE/LITTLE OF THE TIME
HAVE YOU SMOKED AT LEAST 100 CIGARETTES IN YOUR ENTIRE LIFE: NO/DON'T KNOW
COPD SCREENING SCORE: 0

## 2019-12-11 ASSESSMENT — ENCOUNTER SYMPTOMS
RESPIRATORY NEGATIVE: 1
CARDIOVASCULAR NEGATIVE: 1
MYALGIAS: 1
EYES NEGATIVE: 1
NEUROLOGICAL NEGATIVE: 1
CONSTITUTIONAL NEGATIVE: 1
PSYCHIATRIC NEGATIVE: 1
GASTROINTESTINAL NEGATIVE: 1

## 2019-12-11 ASSESSMENT — PATIENT HEALTH QUESTIONNAIRE - PHQ9
2. FEELING DOWN, DEPRESSED, IRRITABLE, OR HOPELESS: NOT AT ALL
1. LITTLE INTEREST OR PLEASURE IN DOING THINGS: NOT AT ALL
SUM OF ALL RESPONSES TO PHQ9 QUESTIONS 1 AND 2: 0

## 2019-12-11 NOTE — ASSESSMENT & PLAN NOTE
Uncontrolled, with hyperglycemia. A21C 10.3%, improved from 2018. Better control over the last 24 hours.   - continue NPH 20 units BID  - resistant sliding scale  - DM diet and hypoglycemia protocol

## 2019-12-11 NOTE — PROGRESS NOTES
40 y/o female with H/O DM admitted with right femur osteomyelitis with abscess.  She underwent I&D of right thigh.  Dressing and HV in place.  Cultures pending.  ID consulted. Continue Unasyn and vancomycin.  Continue tight glucose control.  Continue pain control.  VSS.  Afebrile.  PT/OT to follow.

## 2019-12-11 NOTE — ED NOTES
[facility] to [place] [facility] via wheelchair accompanied by significant other and ED RN. Warm blankets provided and call bell placed within reach. Chart up for ERP.

## 2019-12-11 NOTE — ANESTHESIA PROCEDURE NOTES
Airway  Date/Time: 12/11/2019 9:07 AM  Performed by: Wellington Noel M.D.  Authorized by: Wellington Noel M.D.     Location:  OR  Urgency:  Elective  Indications for Airway Management:  Anesthesia  Spontaneous Ventilation: absent    Sedation Level:  Deep  Preoxygenated: Yes    Patient Position:  Sniffing  Final Airway Type:  Endotracheal airway  Final Endotracheal Airway:  ETT  Cuffed: Yes    Technique Used for Successful ETT Placement:  Direct laryngoscopy  Insertion Site:  Oral  Blade Type:  Yamilet  Laryngoscope Blade/Videolaryngoscope Blade Size:  3  ETT Size (mm):  7.0  Measured from:  Teeth  ETT to Teeth (cm):  21  Placement Verified by: auscultation and capnometry    Cormack-Lehane Classification:  Grade I - full view of glottis  Number of Attempts at Approach:  1

## 2019-12-11 NOTE — ANESTHESIA PREPROCEDURE EVALUATION
Relevant Problems   ENDO   (+) Type 2 diabetes mellitus with hyperglycemia, with long-term current use of insulin (HCC)      Other   (+) Acute hematogenous osteomyelitis of right femur (HCC)       Physical Exam    Airway   Mallampati: II  TM distance: >3 FB  Neck ROM: full       Cardiovascular - normal exam  Rhythm: regular  Rate: normal  (-) murmur     Dental - normal exam  Comments: #8 is chipped.  #23 &24 are misaligned and chipped       Pulmonary - normal exam  Breath sounds clear to auscultation     Abdominal    Neurological - normal exam               Anesthesia Plan    ASA 3       Plan - general       Airway plan will be ETT        Induction: intravenous    Postoperative Plan: Postoperative administration of opioids is intended.    Pertinent diagnostic labs and testing reviewed    Informed Consent:    Anesthetic plan and risks discussed with patient.    Use of blood products discussed with: patient whom consented to blood products.

## 2019-12-11 NOTE — OR NURSING
"PT from OR w/ ANES/RN, PT in severe 10/10 pain in RLE, reported as \"burning\" pain, medicated w/ Fentanyl 100 mcg and Oxycodone 10mg PO, ineffective, medicated w/ Dilaudid 1mg, effective for same, now reports pain 0/10.  PT w/ nausea, medicated w/ Haldol 1mg, ineffective, given Zofran 4mg for nausea as well as quease ease, effective at this time, PT resting quietly w/ eyes closed.  RLE w/ ointment, xerform, 4x4, kerlix and ace wrap, CDI, hemovac clamped to suction w/ serosanguinous drainage noted.  PT VSS, will call report and send PT back to room once pain and nausea fully resolved.   "

## 2019-12-11 NOTE — PROGRESS NOTES
Patient to pre op area with transporter.  Chart assembled, pre op checklist started.  IV antibiotic started.  IV fluids stopped.  Patient will need CHG.  Patient will need bloodless band, endorsed to pre op RN Alondra.

## 2019-12-11 NOTE — PROGRESS NOTES
"Pharmacy Kinetics 39 y.o. female on vancomycin day # 1 2019    Currently on Vancomycin 700 mg iv q8hr  Provider specified end date: 19    Indication for Treatment: SSTI/osteomyelitis    Pertinent history per medical record: Admitted on 12/10/2019 for pain and swelling of right thigh. Soreness after working out but soreness in right leg only got worse. While in [place] in September, was diagnosed with osteomyelitis of the right distal femur, received 6 weeks of antibiotics. Pt states she had a klebsiella infection. Denies IV drug use. History of diabetes. Denies fever, chills, N/V. No history of vancomycin at this facility.    Other antibiotics: Unasyn    Allergies: Bloodless     List concerns for renal function: none    Pertinent cultures to date:   : Blood x2: pending      Recent Labs     12/10/19  2331   WBC 13.5*   NEUTSPOLYS 79.50*     Recent Labs     12/10/19  2331   BUN 15   CREATININE 0.76     No intake or output data in the 24 hours ending 19 0410     /67   Pulse 88   Temp 36.6 °C (97.8 °F) (Oral)   Resp 16   Ht 1.626 m (5' 4\")   Wt 60.4 kg (133 lb 2.5 oz)   SpO2 96%  Temp (24hrs), Av.6 °C (97.8 °F), Min:36.6 °C (97.8 °F), Max:36.6 °C (97.8 °F)      A/P   1. Vancomycin dose: initiating vancomycin IV at 700 mg q8h (0230, 1030, 1830)  2. Next vancomycin level:  @ 0200  3. Goal trough: 12-16 mcg/mL  4. Comments: Vancomycin being initiated alongside Unasyn for empiric coverage of osteomyelitis of right femur. No apparent signs of impaired renal function. Will initiate dosing at 12 mg/kg q8h. Pharmacy will continue to follow and adjust dosing accordingly.    Freddy Ochoa, PharmD  "

## 2019-12-11 NOTE — CONSULTS
Consults     Patient with diabetes and right thigh pain and swelling.  CT with concern for distal femoral osteomyelitis and multiloculated rim-enhancing fluid collections in the soft tissue of the thigh.  Per notes recent history of osteomyelitis of right distal femur and has received 6 weeks of antibiotics.  Per notes this was a Klebsiella infection but will need to confirm.  Orthopedics has been consulted and the patient is in the OR for planned I&D.     Continue vancomycin and Unasyn  Obtain OR cultures  ID will see the patient in the a.m. with formal H&P      Haylee Duval MD  Infectious Diseases

## 2019-12-11 NOTE — OP REPORT
DATE OF SERVICE:  12/11/2019    PREOPERATIVE DIAGNOSES:  1.  Right thigh abscess.  2.  Right femur osteomyelitis.    POSTOPERATIVE DIAGNOSES:  1.  Right thigh abscess.  2.  Right femur osteomyelitis.    PROCEDURES:  1.  Saucerization of right femur for osteomyelitis.  2.  Incision and drainage of deep right thigh abscess.    SURGEON:  Shamar Rubin MD    ASSISTANT:  None.    ANESTHESIOLOGIST:  Wellington Noel MD    ANESTHESIA TYPE:  General.    SPECIMENS:  Cultures.    ESTIMATED BLOOD LOSS:  200 mL    COMPLICATIONS:  None.    DRAINS:  One Hemovac drain placed deep.    OPERATIVE INDICATIONS:  The patient is a pleasant 39-year-old female who   underwent a right thigh I and D and performed a biopsy of the bone in [place] weeks ago for thigh infection and osteomyelitis.  She was placed on IV   antibiotics postoperatively and initially did well.  Unfortunately, she   developed recurrence of her pain in the right thigh and swelling.  She   presented to the ER and CT scan was concerning for an abscess as well as   osteomyelitis of the femur.  She has a normal neurovascular exam and skin   envelope.  Her thigh wound is healed.  She has some swelling about the right   thigh.  Given these findings, she is an appropriately indicated candidate for   incision and drainage of her right thigh abscess and treatment of her   osteomyelitis.  I discussed the risks, benefits, and alternatives including   the risk of persistent infection, wound healing complications, neurovascular   injury, blood loss, DVT, PE, and the medical risks of anesthesia.  We   discussed the likely need for additional surgery.  We discussed benefits   including improved chance of infection clearance and alternatives including   nonoperative management, which is not recommended.  The informed consent was   signed and documented.  I met with her preoperatively and marked the operative   extremity.    OPERATIVE COURSE:  She underwent general anesthesia  and was positioned supine.    All bony prominences were well padded.  The right lower extremity was   prepped and draped in sterile orthopedic fashion using ChloraPrep and the   surgical team scrubbed in.  A procedural pause was conducted to verify correct   patient, correct extremity, presence of the surgeon's initials on the   operative extremity, and administration of IV antibiotics.  In this case, no   additional antibiotics were required, as the patient is receiving them on   schedule, and the procedure being done required cultures.  Following   generalized agreement, her old thigh incision was excised sharply.  We   dissected down to the IT band, incised in line with the incision.  We elevated   the vastus off of the intermuscular septum and entered the abscess cavity.    Copious amounts of purulence were obtained, much of which was sent for   culture.  We irrigated this purulence away.  We then performed an excisional   debridement of the abscess cavity using rongeurs, curettes, forceps, cautery   and scalpel, removing all devitalized appearing tissue.  Deepest level of   debridement here was bone.  A notable amount of bone formation was present with some heterotopic   bone formation on the periosteum.  A curette was used to debride this away and   much of this was sent for culture.  Some tissue culture from the abscess   cavity was also sent for culture.  When that was complete, we thoroughly   irrigated the wound with copious amounts of normal saline.  We ensured that   all devitalized appearing tissue had been removed.  After this point,   inspection of the femur and comparison to her preoperative CT scan, there did   appear to be an area of infected bone with unhealthy devitalized bone without   good blood supply of the posterolateral diaphysis.  A bur was used to window   the cortex in this area and remove what appeared to be infected bone down to   healthy bleeding bone.  No purulence was found within  the intramedullary   canal.  We thoroughly irrigated the wound with copious amounts of normal   saline again using pulse lavage.  When that was complete, the wound was closed   over Hemovac drain with 0 PDS suture in the fascia, 2-0 Monocryl in the   subcutaneous tissue, and staples in the skin.  Sterile dressings were applied.    The patient was transferred to the recovery room in stable condition,   sustaining no complications.    POSTOPERATIVE PLAN:  1.  Follow cultures.  Antibiotics per ID.  2.  DVT prophylaxis, SCDs and Lovenox.  3.  Weightbearing as tolerated, PT consult.  4.  Discharge planning.  We anticipate return to the operating room later this   week or early next week for antibiotic cement placement within the wound once   cultures were able to demonstrate an organism.       ____________________________________     MD EDGARDO Trujillo / NTS    DD:  12/11/2019 10:50:43  DT:  12/11/2019 11:16:55    D#:  [id number]  Job#:  [id number]

## 2019-12-11 NOTE — ANESTHESIA TIME REPORT
Anesthesia Start and Stop Event Times     Date Time Event    12/11/2019 0828 Ready for Procedure     0858 Anesthesia Start     1024 Anesthesia Stop        Responsible Staff  12/11/19    Name Role Begin End    Wellington Noel M.D. Anesth 0858 1024        Preop Diagnosis (Free Text):  Pre-op Diagnosis     Right Thigh Wound        Preop Diagnosis (Codes):    Post op Diagnosis  Osteomyelitis      Premium Reason  Non-Premium    Comments:

## 2019-12-11 NOTE — PROGRESS NOTES
Pharmacy Kinetics Addendum:    39 y.o. female on vancomycin day # 1 12/11/2019    Currently on Vancomycin 700 mg iv q8hr    Indication for Treatment: SSTI/osteomyelitis    List concerns for renal function: recent IV contrast, surgery    Recent Labs     12/10/19  2331   BUN 15   CREATININE 0.76     No results for input(s): VANCOTROUGH, VANCOPEAK, VANCORANDOM in the last 72 hours.    A/P   1. Vancomycin dose change: n/a - continue 12mg/kg q8hr  2. Next vancomycin level: 12/12 @1230  3. Goal trough: 12-16 mcg/mL - target high end of goal  4. Comments: Patient had surgery today, missed dose. Vancomycin re-timed. Will obtain level prior to the 5th total dose as above. BMP tomorrow. Pharmacy will follow. Narrow therapy as able. ID consulted.    Erick Curtis, PharmD

## 2019-12-11 NOTE — CONSULTS
12/11/2019    Reason for consultation: Right thigh abscess    Consultation on Tonya Bro at the request of Dr. Perez for a right thigh abscess.  The patient is a 39 y.o. female who presents with a right thigh abscess due to unknown causes.  She recently underwent an I&D for osteomyelitis in [place], and 4-6 weeks of IV antibiotics following surgery.  Patient reports this was a Klebsiella infection.  She was then doing OK, but then over the past 3-4 days noticed increasing pain in the right thigh, which did not improve with rest.  She presented to the ED due to worsening pain.  They were evaluated in the ER, and Orthopedics was consulted. Patient denies numbness, paresthesias, fevers, chills, weight loss, or other symptoms.    Past Medical History:   Diagnosis Date   • Diabetes (HCC)    • Vaginal yeast infection 11/26/2018       History reviewed. No pertinent surgical history.    Medications  No current facility-administered medications on file prior to encounter.      Current Outpatient Medications on File Prior to Encounter   Medication Sig Dispense Refill   • metFORMIN (GLUCOPHAGE) 500 MG Tab Take 1 Tab by mouth 2 times a day, with meals. 60 Tab 2   • insulin NPH (HUMULIN,NOVOLIN) 100 UNIT/ML Suspension Inject 10 Units as instructed 2 Times a Day. 1 Vial 2       Allergies  Bloodless    ROS  Per HPI. All other systems were reviewed and found to be negative    History reviewed. No pertinent family history.    Social History     Socioeconomic History   • Marital status:      Spouse name: Not on file   • Number of children: Not on file   • Years of education: Not on file   • Highest education level: Not on file   Occupational History   • Not on file   Social Needs   • Financial resource strain: Not on file   • Food insecurity:     Worry: Not on file     Inability: Not on file   • Transportation needs:     Medical: Not on file     Non-medical: Not on file   Tobacco Use   • Smoking status: Former  "Smoker   • Smokeless tobacco: Never Used   Substance and Sexual Activity   • Alcohol use: No   • Drug use: No   • Sexual activity: Not on file   Lifestyle   • Physical activity:     Days per week: Not on file     Minutes per session: Not on file   • Stress: Not on file   Relationships   • Social connections:     Talks on phone: Not on file     Gets together: Not on file     Attends [employer] service: Not on file     Active member of club or organization: Not on file     Attends meetings of clubs or organizations: Not on file     Relationship status: Not on file   • Intimate partner violence:     Fear of current or ex partner: Not on file     Emotionally abused: Not on file     Physically abused: Not on file     Forced sexual activity: Not on file   Other Topics Concern   • Primary/coprimary nurse & associates Not Asked   • Family contact information Not Asked   • OK to release patient information to the following Not Asked   • Patient preferred routine/Privacy concerns Not Asked   • Patient likes and dislikes Not Asked   • Participating In Research Study Not Asked   • Miscellaneous Not Asked   Social History Narrative   • Not on file       Physical Exam  Vitals  /79   Pulse 90   Temp 36.7 °C (98.1 °F) (Temporal)   Resp 18   Ht 1.626 m (5' 4\")   Wt 61.9 kg (136 lb 7.4 oz)   SpO2 97%   General: Well Developed, Well Nourished, no acute distress  Psychiatric: Alert and oriented x3, appropriate responses to questions, pleasant mood and affect.  HEENT: Normocephalic, atraumatic  Eyes: Anicteric, PERRLA, EOMI  Neck: Supple, nontender, no masses  Chest: Symmetric expansion of the chest wall, non-tender to palpation, no distress.  Heart: RRR, palpable peripheral pulses  Abdomen: Soft, NT, ND  Skin: Intact, no open wounds  Extremities: Tender to palpation about right thigh, no deformity but notable swelling of the right thigh  Neuro: Intact light touch sensation in right foot, intact motors TA/GS/EHL/P  Vascular: " 2+ Dp on right, Capillary refill <2 seconds    Radiographs:  CT-EXTREMITY, LOWER WITH RIGHT   Final Result      1.  Cortical irregularity and breakthrough involving the distal femoral diaphysis posterolaterally with overlying periosteal reaction most likely representing osteomyelitis. This less likely represents a neoplastic process. There are linear lucent areas    extending into that region possibly representing prior biopsy versus tracks from prior fixation hardware.      2.  Multiloculated rim-enhancing fluid collections within the soft tissues of the thigh in the area of the above-described abnormality. The largest collection is located intramuscular within the vastus lateralis muscle and measures 7 x 5 x 4 cm in size.    Other components extend into the vastus intermedialis muscle. Some of these also contain gas and most likely represent abscesses.          Laboratory Values  Recent Labs     12/10/19  2331   WBC 13.5*   RBC 5.34   HEMOGLOBIN 14.9   HEMATOCRIT 45.9   MCV 86.0   MCH 27.9   MCHC 32.5*   RDW 41.1   PLATELETCT 510*   MPV 9.6     Recent Labs     12/10/19  2331   SODIUM 136   POTASSIUM 3.6   CHLORIDE 100   CO2 23   GLUCOSE 166*   BUN 15   CPKTOTAL <10     Recent Labs     12/10/19  2331   INR 0.99         Impression:    #1 Right thigh abscess and femoral osteomyelitis    Plan:    I recommended operative treatment of her infection. Risks and benefits of surgery were discussed which include, but are not limited to bleeding, infection, neurovascular damage, need for additional surgery, fracture through the area of bone involvement, and the medical risks of anesthesia including DVT, PE, MI, Stroke and death.  Benefits of surgery discussed included improved chance of infection clearance.  We also discussed therapeutic alternatives to surgery, including non-operative management, which I did not recommend.    They understand these risks and benefits and wish to proceed.      Please keep NPO pending surgery.   Non-weightbearing affected extremity pending surgery.    Please see operative note for detailed post-operative plan, including post-op weightbearing status.

## 2019-12-11 NOTE — ED NOTES
"Patient ambulatory to triage with limping gait. Very upset, stating \"WHen am I gonna go back? I've been waiting for a long time and other people are going ahead of me. At least 4 or 5 people who got here after me have been taken back already.\" Triage process explained to patient. Updated on current wait times. All questions answered. Denies further complaints. Apologized for current wait times and returned to [facility].  "

## 2019-12-11 NOTE — ED NOTES
"Chief Complaint   Patient presents with   • Leg Pain       40 yo femal to triage for above complaint. 8/10 aching/cramping pain to R thigh since Saturday, + swelling, denies numbness or tingling, CMS intact, hx of surgery for femur osteomyelitis in September on [place]. Reports working out and pain started afterwards.   Educated on triage process, encourage to inform staff of any changes.     /100   Pulse (!) 119   Temp 36.6 °C (97.8 °F) (Oral)   Resp 16   Ht 1.626 m (5' 4\")   Wt 60.4 kg (133 lb 2.5 oz)   SpO2 99%   BMI 22.86 kg/m²   "

## 2019-12-11 NOTE — PROGRESS NOTES
2 RN skin check complete with Jinny LEES.  Devices in place oxygen mask, scds, surgical dressing left intact with hemavac.   Skin assessed under devices yes.  Confirmed pressure ulcers found on none.  New potential pressure ulcers noted on none. .  The following interventions in place encourage ambulation, pillow support, patient turns self.

## 2019-12-11 NOTE — ASSESSMENT & PLAN NOTE
Recent I&D and extended antibiotic regimen for Klebsiella OM in [place]. Recurrent infection now with associated multiple abscess formation. OR culture from 12/11 with Klebsiella pneumoniae.   - S/P I&D on 12/11 and 12/17  - ID and ortho following, appreciate  - continue with abx for 6 weeks, through 1/22/2020  - will need tight glycemic control for healing

## 2019-12-11 NOTE — ASSESSMENT & PLAN NOTE
Resolved. This is Sepsis Present on admission  SIRS criteria identified on my evaluation include: Tachycardia, with heart rate greater than 90 BPM and Leukocyosis, with WBC greater than 12,000  Source is Osteomyelitis. Sepsis protocol initiated.

## 2019-12-11 NOTE — PROGRESS NOTES
2 RN Skin Check    2 RN skin check complete with Preeti.  Devices in place: None .  Skin assessed under devices: yes.  Confirmed pressure ulcers found on: None found.  New potential pressure ulcers noted on none found. Wound consult placed No.  The following interventions in place Pillows.  Pt has scar on right lateral thigh.

## 2019-12-11 NOTE — ED PROVIDER NOTES
ED Provider Note    CHIEF COMPLAINT  Chief Complaint   Patient presents with   • Leg Pain       HPI  Tonya Bro is a 39 y.o. female who presents to the emergency department chief complaint of right thigh.  The patient states that in September in [place] she had a diagnosis of osteomyelitis of the right distal femur and actually went to the operating room was started on antibiotics for 6 weeks.  She states she had a Klebsiella infection.  She denies any IV drug use she does have a history of diabetes and states her sugars have been well under control lately.  She is here today because over the last 3 to 4 days she is had worsening swelling and pain in her right thigh.  She states she feels a little tired but denies any fevers chills nausea or vomiting or redness to the leg.  She states it is quite swollen and very uncomfortable to the point she cannot even really walk on it due to the pain.  She thinks it may have been related the fact that she tried to exercise for the first time in a long time but states that her other muscles were normally sore but this is different and is gotten worse rather than better.  Pain is currently a 7 on a 10 and worse with movement and ambulation better with rest      REVIEW OF SYSTEMS  Positives as above. Pertinent negatives include nausea vomiting fevers chills skin changes IV drug use easy bleeding or bruising rash numbness tingling  All other review of systems are negative    PAST MEDICAL HISTORY   has a past medical history of Diabetes ([facility]) and Vaginal yeast infection (11/26/2018).    SOCIAL HISTORY  Social History     Tobacco Use   • Smoking status: Former Smoker   • Smokeless tobacco: Never Used   Substance and Sexual Activity   • Alcohol use: No   • Drug use: No   • Sexual activity: Not on file       SURGICAL HISTORY  patient denies any surgical history    CURRENT MEDICATIONS  Home Medications    **Home medications have not yet been reviewed for this  "encounter**         ALLERGIES  Allergies   Allergen Reactions   • Bloodless        PHYSICAL EXAM  VITAL SIGNS: /100   Pulse (!) 119   Temp 36.6 °C (97.8 °F) (Oral)   Resp 16   Ht 1.626 m (5' 4\")   Wt 60.4 kg (133 lb 2.5 oz)   SpO2 99%   BMI 22.86 kg/m²    Pulse ox interpretation: I interpret this pulse ox as normal.  Constitutional: Alert in no apparent distress.  HENT: Normocephalic atraumatic, MMM  Eyes: PER, Conjunctiva normal, Non-icteric.   Neck: Normal range of motion, No tenderness, Supple, No stridor.   Cardiovascular: Tachycardic regular rhythm, no murmurs.   Thorax & Lungs: Normal breath sounds, No respiratory distress, No wheezing, No chest tenderness.   Abdomen: Bowel sounds normal, Soft, No tenderness, No pulsatile masses. No peritoneal signs.  Skin: Warm, Dry, No erythema, No rash.   Back: No bony tenderness, No CVA tenderness.   Extremities/MSK: Intact distal pulses, No edema, right distal lateral thigh at the area of a prior scar there is swelling and tenderness and also some hardness to the area without blistering or erythema there is warmth no cyanosis, no major deformities noted  Neurologic: Alert and oriented x3, No focal deficits noted.       DIFFERENTIAL DIAGNOSIS AND WORK UP PLAN    This is a 39 y.o. female who presents with swelling and pain in the site of a prior osteomyelitis and infection, could be a hematoma or strain or sprain of the muscle could be muscle breakdown could be a recurrence of infection.  We will treat the patient with pain management laboratory analysis and a CT scan of the thigh.    DIAGNOSTIC STUDIES / PROCEDURES    LABS  Pertinent Lab Findings  Elevated white blood cell count of 13 with a left shift as well as an elevated platelet count, BMP within normal limits CPK normal the patient is not pregnant blood cultures are sent elevated sed rate as well as a CRP      RADIOLOGY  CT-EXTREMITY, LOWER WITH RIGHT   Final Result      1.  Cortical irregularity and " breakthrough involving the distal femoral diaphysis posterolaterally with overlying periosteal reaction most likely representing osteomyelitis. This less likely represents a neoplastic process. There are linear lucent areas    extending into that region possibly representing prior biopsy versus tracks from prior fixation hardware.      2.  Multiloculated rim-enhancing fluid collections within the soft tissues of the thigh in the area of the above-described abnormality. The largest collection is located intramuscular within the vastus lateralis muscle and measures 7 x 5 x 4 cm in size.    Other components extend into the vastus intermedialis muscle. Some of these also contain gas and most likely represent abscesses.        The radiologist's interpretation of all radiological studies have been reviewed by me.      COURSE & MEDICAL DECISION MAKING  Pertinent Labs & Imaging studies reviewed. (See chart for details)    1:01 AM  Spoke w patient regarding her CT scan results and the plan for admission and likely operative management of the large abscesses in her right thigh.  She is little distraught but understands the plan.  Patient started on IV antibx     1:07 AM  Spoke with Dr. Rubin with orthopedic surgery he recommends admission and the patient to the hospitalist service and he will consult in the morning for likely drainage    1:34 AM  Spoke w Dr Varela with the hospitalist service and he has accepted the patient for hospitalization    DISPOSITION:  Patient will be evaluated for hospitalization by Dr Varela in guarded condition.    FINAL IMPRESSION  1. Thigh abscess  2. Femur osteomyelitis         Electronically signed by: Tamy Perez, 12/10/2019 10:39 PM    This dictation has been created using voice recognition software and/or scribes. The accuracy of the dictation is limited by the abilities of the software and the expertise of the scribes. I expect there may be some errors of grammar and possibly content. I  made every attempt to manually correct the errors within my dictation. However, errors related to voice recognition software and/or scribes may still exist and should be interpreted within the appropriate context.

## 2019-12-11 NOTE — H&P
Hospital Medicine History & Physical Note    Date of Service  12/11/2019    Primary Care Physician  Pcp Pt States None    Consultants  Orthopedic surgery Dr. Rubin    Code Status  Full code    Chief Complaint  Right leg pain    History of Presenting Illness  39 y.o. female with history of controlled diabetes mellitus on insulin regimen, prior knee surgery with infection and bacteremia, was in her usual state of health until several days prior to admission.  She worked out, and had the customary soreness of her muscles afterward, however her right thigh seemed to stay inflamed and painful for another day or so.  This became somewhat hard, and full, despite the rest of her body having recovered.  She denies any fever or chills, no chest pain or shortness of breath, abdominal pain, nausea vomiting, diarrhea or constipation.  No other complaints.    Review of Systems  Review of Systems   Constitutional: Negative.    HENT: Negative.    Eyes: Negative.    Respiratory: Negative.    Cardiovascular: Negative.    Gastrointestinal: Negative.    Genitourinary: Negative.    Musculoskeletal: Positive for myalgias.   Skin: Negative.    Neurological: Negative.    Endo/Heme/Allergies: Negative.    Psychiatric/Behavioral: Negative.        Past Medical History   has a past medical history of Diabetes ([facility]) and Vaginal yeast infection (11/26/2018). She also has no past medical history of Anginal syndrome ([facility]), Arrhythmia, Arthritis, Asthma, At risk for sleep apnea, Back pain, Blood clotting disorder ([facility]), Bronchitis, Cancer ([facility]), Cataract, Congestive heart failure ([facility]), COPD (chronic obstructive pulmonary disease) ([facility]), Dialysis patient ([facility]), Disorder of thyroid, Fall, Glaucoma, Heart murmur, Heart valve disease, Hemorrhagic disorder ([facility]), Hypertension, Indigestion, Jaundice, Myocardial infarct ([facility]), Pacemaker, Pneumonia, Psychiatric problem, Renal disorder, Rheumatic fever, Seizure ([facility]), Stroke ([facility]), or Urinary  incontinence.    Surgical History  Prior femoral surgery    Family History  Family history reviewed and not pertinent    Social History   reports that she has quit smoking. She has never used smokeless tobacco. She reports that she does not drink alcohol or use drugs.    Allergies  Allergies   Allergen Reactions   • Bloodless        Medications  Prior to Admission Medications   Prescriptions Last Dose Informant Patient Reported? Taking?   insulin NPH (HUMULIN,NOVOLIN) 100 UNIT/ML Suspension   No No   Sig: Inject 10 Units as instructed 2 Times a Day.   metFORMIN (GLUCOPHAGE) 500 MG Tab   No No   Sig: Take 1 Tab by mouth 2 times a day, with meals.      Facility-Administered Medications: None       Physical Exam  Temp:  [36.6 °C (97.8 °F)] 36.6 °C (97.8 °F)  Pulse:  [] 88  Resp:  [16] 16  BP: (109-138)/() 109/67  SpO2:  [96 %-99 %] 96 %    Physical Exam  Vitals signs and nursing note reviewed.   Constitutional:       Appearance: Normal appearance.   HENT:      Head: Normocephalic and atraumatic.      Nose: Nose normal.      Mouth/Throat:      Mouth: Mucous membranes are moist.      Pharynx: Oropharynx is clear.   Eyes:      Extraocular Movements: Extraocular movements intact.      Conjunctiva/sclera: Conjunctivae normal.      Pupils: Pupils are equal, round, and reactive to light.   Neck:      Musculoskeletal: Normal range of motion and neck supple.   Cardiovascular:      Rate and Rhythm: Normal rate and regular rhythm.      Pulses: Normal pulses.      Heart sounds: Normal heart sounds. No murmur. No friction rub. No gallop.    Pulmonary:      Effort: Pulmonary effort is normal. No respiratory distress.      Breath sounds: Normal breath sounds. No wheezing.   Abdominal:      General: Abdomen is flat. Bowel sounds are normal. There is no distension.      Palpations: Abdomen is soft.      Tenderness: There is no tenderness.   Musculoskeletal: Normal range of motion.         General: Signs of injury present.  No swelling, tenderness or deformity.   Skin:     General: Skin is warm and dry.   Neurological:      General: No focal deficit present.      Mental Status: She is alert and oriented to person, place, and time. Mental status is at baseline.      Cranial Nerves: Cranial nerve deficit present.   Psychiatric:         Mood and Affect: Mood normal.         Behavior: Behavior normal.         Laboratory:  Recent Labs     12/10/19  2331   WBC 13.5*   RBC 5.34   HEMOGLOBIN 14.9   HEMATOCRIT 45.9   MCV 86.0   MCH 27.9   MCHC 32.5*   RDW 41.1   PLATELETCT 510*   MPV 9.6     Recent Labs     12/10/19  2331   SODIUM 136   POTASSIUM 3.6   CHLORIDE 100   CO2 23   GLUCOSE 166*   BUN 15   CREATININE 0.76   CALCIUM 9.6     Recent Labs     12/10/19  2331   GLUCOSE 166*         No results for input(s): NTPROBNP in the last 72 hours.      No results for input(s): TROPONINT in the last 72 hours.    Urinalysis:    No results found     Imaging:  CT-EXTREMITY, LOWER WITH RIGHT   Final Result      1.  Cortical irregularity and breakthrough involving the distal femoral diaphysis posterolaterally with overlying periosteal reaction most likely representing osteomyelitis. This less likely represents a neoplastic process. There are linear lucent areas    extending into that region possibly representing prior biopsy versus tracks from prior fixation hardware.      2.  Multiloculated rim-enhancing fluid collections within the soft tissues of the thigh in the area of the above-described abnormality. The largest collection is located intramuscular within the vastus lateralis muscle and measures 7 x 5 x 4 cm in size.    Other components extend into the vastus intermedialis muscle. Some of these also contain gas and most likely represent abscesses.            Assessment/Plan:  I anticipate this patient will require at least two midnights for appropriate medical management, necessitating inpatient admission.    * Acute hematogenous osteomyelitis of right  femur (HCC)  Assessment & Plan  With associated multiple abscess formation.  Defer to surgery for further evaluation, continue with intravenous antibiotic therapy for now.    Sepsis without acute organ dysfunction (HCC)  Assessment & Plan  This is Sepsis Present on admission  SIRS criteria identified on my evaluation include: Tachycardia, with heart rate greater than 90 BPM and Leukocyosis, with WBC greater than 12,000  Source is Osteomyelitis  Sepsis protocol initiated  Fluid resuscitation ordered per protocol  IV antibiotics as appropriate for source of sepsis  While organ dysfunction may be noted elsewhere in this problem list or in the chart, degree of organ dysfunction does not meet CMS criteria for severe sepsis          Type 2 diabetes mellitus with hyperglycemia, with long-term current use of insulin (HCC)  Assessment & Plan  Monitor with current insulin regimen, add correction dose insulin.        VTE prophylaxis: SCD

## 2019-12-11 NOTE — PROGRESS NOTES
From PACU on ERAS 10L oxymask.  SCDs applied, antibiotics restarted as ordered.  Glucose checked and treated.  Patient medicated for pain.  Admission profile completed with patient.  Yet to void, yet to ambulate.

## 2019-12-11 NOTE — CARE PLAN
Problem: Communication  Goal: The ability to communicate needs accurately and effectively will improve  Outcome: PROGRESSING AS EXPECTED  Plan of care communicated for surgery.      Problem: Knowledge Deficit  Goal: Knowledge of disease process/condition, treatment plan, diagnostic tests, and medications will improve  Outcome: PROGRESSING AS EXPECTED'  Patient aware of plan of care.

## 2019-12-12 PROBLEM — E86.0 DEHYDRATION: Status: ACTIVE | Noted: 2019-12-12

## 2019-12-12 LAB
ANION GAP SERPL CALC-SCNC: 11 MMOL/L (ref 0–11.9)
BASOPHILS # BLD AUTO: 0.1 % (ref 0–1.8)
BASOPHILS # BLD: 0.01 K/UL (ref 0–0.12)
BUN SERPL-MCNC: 26 MG/DL (ref 8–22)
CALCIUM SERPL-MCNC: 8.5 MG/DL (ref 8.5–10.5)
CHLORIDE SERPL-SCNC: 99 MMOL/L (ref 96–112)
CO2 SERPL-SCNC: 24 MMOL/L (ref 20–33)
CREAT SERPL-MCNC: 1.13 MG/DL (ref 0.5–1.4)
EOSINOPHIL # BLD AUTO: 0 K/UL (ref 0–0.51)
EOSINOPHIL NFR BLD: 0 % (ref 0–6.9)
ERYTHROCYTE [DISTWIDTH] IN BLOOD BY AUTOMATED COUNT: 40.3 FL (ref 35.9–50)
GLUCOSE BLD-MCNC: 171 MG/DL (ref 65–99)
GLUCOSE BLD-MCNC: 215 MG/DL (ref 65–99)
GLUCOSE BLD-MCNC: 240 MG/DL (ref 65–99)
GLUCOSE BLD-MCNC: 269 MG/DL (ref 65–99)
GLUCOSE BLD-MCNC: 275 MG/DL (ref 65–99)
GLUCOSE BLD-MCNC: 413 MG/DL (ref 65–99)
GLUCOSE SERPL-MCNC: 492 MG/DL (ref 65–99)
HCT VFR BLD AUTO: 36.4 % (ref 37–47)
HGB BLD-MCNC: 11.8 G/DL (ref 12–16)
IMM GRANULOCYTES # BLD AUTO: 0.04 K/UL (ref 0–0.11)
IMM GRANULOCYTES NFR BLD AUTO: 0.3 % (ref 0–0.9)
LACTATE BLD-SCNC: 2.8 MMOL/L (ref 0.5–2)
LYMPHOCYTES # BLD AUTO: 0.87 K/UL (ref 1–4.8)
LYMPHOCYTES NFR BLD: 7.5 % (ref 22–41)
MCH RBC QN AUTO: 28.4 PG (ref 27–33)
MCHC RBC AUTO-ENTMCNC: 32.9 G/DL (ref 33.6–35)
MCV RBC AUTO: 86.3 FL (ref 81.4–97.8)
MONOCYTES # BLD AUTO: 0.7 K/UL (ref 0–0.85)
MONOCYTES NFR BLD AUTO: 6 % (ref 0–13.4)
NEUTROPHILS # BLD AUTO: 10.03 K/UL (ref 2–7.15)
NEUTROPHILS NFR BLD: 86.1 % (ref 44–72)
NRBC # BLD AUTO: 0 K/UL
NRBC BLD-RTO: 0 /100 WBC
PLATELET # BLD AUTO: 426 K/UL (ref 164–446)
PMV BLD AUTO: 10 FL (ref 9–12.9)
POTASSIUM SERPL-SCNC: 4.4 MMOL/L (ref 3.6–5.5)
RBC # BLD AUTO: 4.16 M/UL (ref 4.2–5.4)
SODIUM SERPL-SCNC: 134 MMOL/L (ref 135–145)
VANCOMYCIN TROUGH SERPL-MCNC: 18 UG/ML (ref 10–20)
WBC # BLD AUTO: 11.7 K/UL (ref 4.8–10.8)

## 2019-12-12 PROCEDURE — 700102 HCHG RX REV CODE 250 W/ 637 OVERRIDE(OP): Performed by: INTERNAL MEDICINE

## 2019-12-12 PROCEDURE — 700111 HCHG RX REV CODE 636 W/ 250 OVERRIDE (IP): Performed by: NURSE PRACTITIONER

## 2019-12-12 PROCEDURE — 700105 HCHG RX REV CODE 258: Performed by: HOSPITALIST

## 2019-12-12 PROCEDURE — 97165 OT EVAL LOW COMPLEX 30 MIN: CPT

## 2019-12-12 PROCEDURE — 700105 HCHG RX REV CODE 258: Performed by: INTERNAL MEDICINE

## 2019-12-12 PROCEDURE — 36415 COLL VENOUS BLD VENIPUNCTURE: CPT

## 2019-12-12 PROCEDURE — 700102 HCHG RX REV CODE 250 W/ 637 OVERRIDE(OP): Performed by: HOSPITALIST

## 2019-12-12 PROCEDURE — 80202 ASSAY OF VANCOMYCIN: CPT

## 2019-12-12 PROCEDURE — 82962 GLUCOSE BLOOD TEST: CPT | Mod: 91

## 2019-12-12 PROCEDURE — 700102 HCHG RX REV CODE 250 W/ 637 OVERRIDE(OP): Performed by: NURSE PRACTITIONER

## 2019-12-12 PROCEDURE — 99255 IP/OBS CONSLTJ NEW/EST HI 80: CPT | Performed by: INTERNAL MEDICINE

## 2019-12-12 PROCEDURE — A9270 NON-COVERED ITEM OR SERVICE: HCPCS | Performed by: INTERNAL MEDICINE

## 2019-12-12 PROCEDURE — A9270 NON-COVERED ITEM OR SERVICE: HCPCS | Performed by: HOSPITALIST

## 2019-12-12 PROCEDURE — 700111 HCHG RX REV CODE 636 W/ 250 OVERRIDE (IP): Performed by: HOSPITALIST

## 2019-12-12 PROCEDURE — A9270 NON-COVERED ITEM OR SERVICE: HCPCS | Performed by: NURSE PRACTITIONER

## 2019-12-12 PROCEDURE — 700111 HCHG RX REV CODE 636 W/ 250 OVERRIDE (IP): Performed by: INTERNAL MEDICINE

## 2019-12-12 PROCEDURE — 770006 HCHG ROOM/CARE - MED/SURG/GYN SEMI*

## 2019-12-12 PROCEDURE — 99233 SBSQ HOSP IP/OBS HIGH 50: CPT | Performed by: INTERNAL MEDICINE

## 2019-12-12 RX ORDER — OXYCODONE HYDROCHLORIDE 10 MG/1
10 TABLET ORAL
Status: DISCONTINUED | OUTPATIENT
Start: 2019-12-12 | End: 2019-12-18 | Stop reason: HOSPADM

## 2019-12-12 RX ORDER — MORPHINE SULFATE 4 MG/ML
4 INJECTION, SOLUTION INTRAMUSCULAR; INTRAVENOUS
Status: DISCONTINUED | OUTPATIENT
Start: 2019-12-12 | End: 2019-12-18 | Stop reason: HOSPADM

## 2019-12-12 RX ORDER — OXYCODONE HYDROCHLORIDE 5 MG/1
5 TABLET ORAL EVERY 4 HOURS PRN
Status: DISCONTINUED | OUTPATIENT
Start: 2019-12-12 | End: 2019-12-12

## 2019-12-12 RX ORDER — MORPHINE SULFATE 4 MG/ML
2 INJECTION, SOLUTION INTRAMUSCULAR; INTRAVENOUS ONCE
Status: COMPLETED | OUTPATIENT
Start: 2019-12-12 | End: 2019-12-12

## 2019-12-12 RX ORDER — CYCLOBENZAPRINE HCL 10 MG
10 TABLET ORAL 3 TIMES DAILY PRN
Status: DISCONTINUED | OUTPATIENT
Start: 2019-12-12 | End: 2019-12-18 | Stop reason: HOSPADM

## 2019-12-12 RX ORDER — OXYCODONE HYDROCHLORIDE 5 MG/1
5 TABLET ORAL
Status: DISCONTINUED | OUTPATIENT
Start: 2019-12-12 | End: 2019-12-18 | Stop reason: HOSPADM

## 2019-12-12 RX ORDER — OXYCODONE HYDROCHLORIDE 10 MG/1
10 TABLET ORAL EVERY 4 HOURS PRN
Status: DISCONTINUED | OUTPATIENT
Start: 2019-12-12 | End: 2019-12-12

## 2019-12-12 RX ADMIN — AMPICILLIN SODIUM AND SULBACTAM SODIUM 3 G: 2; 1 INJECTION, POWDER, FOR SOLUTION INTRAMUSCULAR; INTRAVENOUS at 14:30

## 2019-12-12 RX ADMIN — OXYCODONE HYDROCHLORIDE 5 MG: 5 TABLET ORAL at 04:28

## 2019-12-12 RX ADMIN — AMPICILLIN SODIUM AND SULBACTAM SODIUM 3 G: 2; 1 INJECTION, POWDER, FOR SOLUTION INTRAMUSCULAR; INTRAVENOUS at 02:37

## 2019-12-12 RX ADMIN — PIPERACILLIN AND TAZOBACTAM 4.5 G: 4; .5 INJECTION, POWDER, LYOPHILIZED, FOR SOLUTION INTRAVENOUS; PARENTERAL at 17:51

## 2019-12-12 RX ADMIN — POLYETHYLENE GLYCOL 3350 1 PACKET: 17 POWDER, FOR SOLUTION ORAL at 11:59

## 2019-12-12 RX ADMIN — INSULIN HUMAN 20 UNITS: 100 INJECTION, SUSPENSION SUBCUTANEOUS at 06:22

## 2019-12-12 RX ADMIN — OXYCODONE HYDROCHLORIDE 5 MG: 5 TABLET ORAL at 07:34

## 2019-12-12 RX ADMIN — CYCLOBENZAPRINE HYDROCHLORIDE 10 MG: 10 TABLET, FILM COATED ORAL at 10:35

## 2019-12-12 RX ADMIN — MORPHINE SULFATE 2 MG: 4 INJECTION INTRAVENOUS at 08:38

## 2019-12-12 RX ADMIN — INSULIN LISPRO 4 UNITS: 100 INJECTION, SOLUTION INTRAVENOUS; SUBCUTANEOUS at 07:27

## 2019-12-12 RX ADMIN — AMPICILLIN SODIUM AND SULBACTAM SODIUM 3 G: 2; 1 INJECTION, POWDER, FOR SOLUTION INTRAMUSCULAR; INTRAVENOUS at 07:31

## 2019-12-12 RX ADMIN — SENNOSIDES AND DOCUSATE SODIUM 2 TABLET: 8.6; 5 TABLET ORAL at 17:43

## 2019-12-12 RX ADMIN — MORPHINE SULFATE 2 MG: 4 INJECTION INTRAVENOUS at 10:14

## 2019-12-12 RX ADMIN — INSULIN LISPRO 3 UNITS: 100 INJECTION, SOLUTION INTRAVENOUS; SUBCUTANEOUS at 11:59

## 2019-12-12 RX ADMIN — INSULIN LISPRO 7 UNITS: 100 INJECTION, SOLUTION INTRAVENOUS; SUBCUTANEOUS at 17:54

## 2019-12-12 RX ADMIN — OXYCODONE HYDROCHLORIDE 5 MG: 5 TABLET ORAL at 01:09

## 2019-12-12 RX ADMIN — INSULIN HUMAN 20 UNITS: 100 INJECTION, SUSPENSION SUBCUTANEOUS at 17:55

## 2019-12-12 RX ADMIN — VANCOMYCIN HYDROCHLORIDE 700 MG: 500 INJECTION, POWDER, LYOPHILIZED, FOR SOLUTION INTRAVENOUS at 11:59

## 2019-12-12 RX ADMIN — OXYCODONE HYDROCHLORIDE 10 MG: 10 TABLET ORAL at 17:43

## 2019-12-12 RX ADMIN — OXYCODONE HYDROCHLORIDE 10 MG: 10 TABLET ORAL at 14:35

## 2019-12-12 RX ADMIN — VANCOMYCIN HYDROCHLORIDE 700 MG: 500 INJECTION, POWDER, LYOPHILIZED, FOR SOLUTION INTRAVENOUS at 04:28

## 2019-12-12 RX ADMIN — OXYCODONE HYDROCHLORIDE 10 MG: 10 TABLET ORAL at 10:35

## 2019-12-12 ASSESSMENT — ENCOUNTER SYMPTOMS
TINGLING: 0
CONSTIPATION: 0
DIZZINESS: 0
DOUBLE VISION: 0
NAUSEA: 0
ABDOMINAL PAIN: 0
HEADACHES: 0
SHORTNESS OF BREATH: 0
MYALGIAS: 1
TREMORS: 0
CHILLS: 0
COUGH: 0
BLURRED VISION: 0
FEVER: 0
FOCAL WEAKNESS: 1
PALPITATIONS: 0
DIARRHEA: 0
SENSORY CHANGE: 0
VOMITING: 0
SPEECH CHANGE: 0

## 2019-12-12 ASSESSMENT — COGNITIVE AND FUNCTIONAL STATUS - GENERAL
DRESSING REGULAR LOWER BODY CLOTHING: A LITTLE
HELP NEEDED FOR BATHING: A LITTLE
SUGGESTED CMS G CODE MODIFIER DAILY ACTIVITY: CJ
TOILETING: A LITTLE
DAILY ACTIVITIY SCORE: 21

## 2019-12-12 ASSESSMENT — ACTIVITIES OF DAILY LIVING (ADL): TOILETING: INDEPENDENT

## 2019-12-12 NOTE — PROGRESS NOTES
I have seen and examined the patient independently. All new labs and imaging studies were reviewed. Case was discussed in detail with the nurse practitioner/resident and plan of care formulated with the provider. I agree with the assessment and plan as well as physical exam findings with the following additions:    39-year-old female with known history of uncontrolled diabetes presents with recurrent right thigh abscess and osteomyelitis status post incision and drainage by orthopedic surgery.      Tissue culture positive for Klebsiella, sensitive to Unasyn and Rocephin, antibiotics per ID.  Now on Rocephin.  Need 6 weeks course per ID  Status post PICC line placement    Orthopedic surgery following, will likely need antibiotic cementing/spacer.  Sometime next week per orthopedic surgery    Ambulating without assistance    Pain control, limit IV narcotic use.  Encourage activity    Diabetes with hyperglycemia, improving  Continue to monitor closely    Postop day #5

## 2019-12-12 NOTE — CARE PLAN
Problem: Infection  Goal: Will remain free from infection  Outcome: PROGRESSING AS EXPECTED  Afebrile, on IV antibiotics.       Problem: Pain Management  Goal: Pain level will decrease to patient's comfort goal  Outcome: PROGRESSING AS EXPECTED   Pain controlled with prn medication.

## 2019-12-12 NOTE — PROGRESS NOTES
Notified Dr. Segura of patient's elevated blood sugar. Patient snacking on apple sauce, potato chips and energy bars despite education about glucose control. New orders received.

## 2019-12-12 NOTE — THERAPY
PT Contact Note:    PT order acknowledged; met with patient and explained the role of PT in the acute setting. She reports she is independent in her mobility at this time and does not feel she needs PT. She states she had an I&D of her R thigh in Sept and was using crutches at that time; she had another I&D of her R thigh during this admission and is due to return to the OR next week for another washout. She stated she would rather wait until that time if she needs therapy. Pt's wishes respected. Will complete this order and await f/u orders should she require PT at a later time.     Otilia Rojas, PT  Pager [phone]

## 2019-12-12 NOTE — PROGRESS NOTES
Call placed to Dr. Sosa for critical blood sugar.  D/w Dr. Sosa orders given to change to high dose novolog scale, discontinue regular insulin.

## 2019-12-12 NOTE — CONSULTS
ADULT INFECTIOUS DISEASE CONSULT     Date of Service: 12/12/2019    Consult Requested By: Jinny Sosa D.O.    Reason for Consultation: Osteomyelitis and right thigh abscess    History of Present Illness:   Tonya Bro is a 39 y.o.  female who was diagnosed with diabetes mellitus 1-1/2-year ago.  In September 19 she started having pain in her right thigh after she developed fevers.  She was admitted to [facility] in [place].  She was diagnosed with Klebsiella osteomyelitis.  She states that she was in the hospital for 4 weeks.  I do not have access to those records.  She says she was doing fairly well up until recently.  She worked out on 12/9/2019 and then she developed significant pain and swelling to her right thigh.  Hence she came into the emergency room on 12/10/2019.  In the ER she had a WBC count of 13.5 and sed rate of 76.  She underwent a CAT scan which showed multiloculated remanence enhancing fluid collections within the soft tissue of the thigh.  Also there was osteomyelitis of the femoral diaphysis.  She was evaluated by orthopedics and was taken to surgery.  On 12/10/2019 she underwent saucerization of right femur for osteomyelitis and I&D of the deep right thigh abscess.  Those cultures are showing gram-negative rods.  In view of that infectious disease consult has been called  Review Of Systems:  Gen.-denies fevers. Denies any chills.  HEENT- denies any sore throat, headache or vision changes  Pulmonary- denies any cough, shortness of breath  Cardiovascular- denies any chest pain, leg swelling.    GI- denies any nausea vomiting diarrhea or abdominal pain  Musculoskeletal-complains of pain and swelling in the right thigh  Neuro- denies any weakness or sensory change  Psych- denies any depression or suicidal ideation  Genitourinary- denies any frequency or dysuria        PMH:   Past Medical History:   Diagnosis Date   • Diabetes (HCC)    • Vaginal yeast infection  11/26/2018         PSH:  Past Surgical History:   Procedure Laterality Date   • IRRIGATION & DEBRIDEMENT ORTHO Right 12/11/2019    Procedure: IRRIGATION AND DEBRIDEMENT, WOUND - THIGH;  Surgeon: Shamar Rubin M.D.;  Location: SURGERY [facility];  Service: Orthopedics       FAMILY HX:  History reviewed. No pertinent family history.    SOCIAL HX:  Social History     Socioeconomic History   • Marital status:      Spouse name: Not on file   • Number of children: Not on file   • Years of education: Not on file   • Highest education level: Not on file   Occupational History   • Not on file   Social Needs   • Financial resource strain: Not on file   • Food insecurity:     Worry: Not on file     Inability: Not on file   • Transportation needs:     Medical: Not on file     Non-medical: Not on file   Tobacco Use   • Smoking status: Former Smoker   • Smokeless tobacco: Never Used   Substance and Sexual Activity   • Alcohol use: No   • Drug use: No   • Sexual activity: Not on file   Lifestyle   • Physical activity:     Days per week: Not on file     Minutes per session: Not on file   • Stress: Not on file   Relationships   • Social connections:     Talks on phone: Not on file     Gets together: Not on file     Attends Orthodoxy service: Not on file     Active member of club or organization: Not on file     Attends meetings of clubs or organizations: Not on file     Relationship status: Not on file   • Intimate partner violence:     Fear of current or ex partner: Not on file     Emotionally abused: Not on file     Physically abused: Not on file     Forced sexual activity: Not on file   Other Topics Concern   • Primary/coprimary nurse & associates Not Asked   • Family contact information Not Asked   • OK to release patient information to the following Not Asked   • Patient preferred routine/Privacy concerns Not Asked   • Patient likes and dislikes Not Asked   • Participating In Research Study Not Asked   •  Miscellaneous Not Asked   Social History Narrative   • Not on file     Social History     Tobacco Use   Smoking Status Former Smoker   Smokeless Tobacco Never Used     Social History     Substance and Sexual Activity   Alcohol Use No       Allergies/Intolerances:  Allergies   Allergen Reactions   • Bloodless        History reviewed with the patient    Other Current Medications:    Current Facility-Administered Medications:   •  oxyCODONE immediate-release (ROXICODONE) tablet 5 mg, 5 mg, Oral, Q4HRS PRN **OR** oxyCODONE immediate release (ROXICODONE) tablet 10 mg, 10 mg, Oral, Q4HRS PRN, Edwin Koenig, A.P.R.N., 10 mg at 12/12/19 1035  •  Notify provider if pain remains uncontrolled, , , CONTINUOUS **AND** Use the numeric rating scale (NRS-11) on regular floors and Critical-Care Pain Observation Tool (CPOT) on ICUs/Trauma to assess pain, , , CONTINUOUS **AND** Pulse Ox (Oximetry), , , CONTINUOUS **AND** Pharmacy Consult Request ...Pain Management Review 1 Each, 1 Each, Other, PHARMACY TO DOSE **AND** If patient difficult to arouse and/or has respiratory depression, stop any opiates that are currently infusing and call a Rapid Response., , , CONTINUOUS **AND** [DISCONTINUED] oxyCODONE immediate-release (ROXICODONE) tablet 2.5 mg, 2.5 mg, Oral, Q3HRS PRN **AND** [DISCONTINUED] oxyCODONE immediate-release (ROXICODONE) tablet 5 mg, 5 mg, Oral, Q3HRS PRN, 5 mg at 12/12/19 0734 **AND** morphine (pf) 4 MG/ML injection 4 mg, 4 mg, Intravenous, Q3HRS PRN, Edwin Keonig, A.P.R.N.  •  cyclobenzaprine (FLEXERIL) tablet 10 mg, 10 mg, Oral, TID PRN, Jinny Sosa D.O., 10 mg at 12/12/19 1035  •  acetaminophen (TYLENOL) tablet 650 mg, 650 mg, Oral, Q6HRS PRN, Gurpreet Varela M.D., 650 mg at 12/11/19 1340  •  cloNIDine (CATAPRES) tablet 0.1 mg, 0.1 mg, Oral, Q6HRS PRN, Gurpreet Varela M.D.  •  ondansetron (ZOFRAN) syringe/vial injection 4 mg, 4 mg, Intravenous, Q4HRS PRN, Gurpreet Varela M.D., 4 mg at 12/11/19 0917  •  ondansetron  (ZOFRAN ODT) dispertab 4 mg, 4 mg, Oral, Q4HRS PRN, Gurpreet Varela M.D.  •  promethazine (PHENERGAN) tablet 12.5-25 mg, 12.5-25 mg, Oral, Q4HRS PRN, Gurpreet Varela M.D., 25 mg at 12/11/19 1255  •  promethazine (PHENERGAN) suppository 12.5-25 mg, 12.5-25 mg, Rectal, Q4HRS PRN, Gurpreet Varela M.D.  •  prochlorperazine (COMPAZINE) injection 5-10 mg, 5-10 mg, Intravenous, Q4HRS PRN, Gurpreet Varela M.D.  •  senna-docusate (PERICOLACE or SENOKOT S) 8.6-50 MG per tablet 2 Tab, 2 Tab, Oral, BID, 2 Tab at 12/11/19 1813 **AND** polyethylene glycol/lytes (MIRALAX) PACKET 1 Packet, 1 Packet, Oral, QDAY PRN, 1 Packet at 12/12/19 1159 **AND** magnesium hydroxide (MILK OF MAGNESIA) suspension 30 mL, 30 mL, Oral, QDAY PRN **AND** bisacodyl (DULCOLAX) suppository 10 mg, 10 mg, Rectal, QDAY PRN, Gurpreet Varela M.D.  •  lactated ringers infusion (BOLUS), 500 mL, Intravenous, Once PRN, Gurpreet Varela M.D.  •  lactated ringers infusion, , Intravenous, Continuous, Gurpreet Varela M.D., Last Rate: 125 mL/hr at 12/11/19 2318  •  ampicillin/sulbactam (UNASYN) 3 g in  mL IVPB, 3 g, Intravenous, Q6HRS, Gurpreet Varela M.D., Stopped at 12/12/19 0801  •  MD Alert...Vancomycin per Pharmacy, 1 Each, Other, PRN, Gurpreet Varela M.D.  •  [DISCONTINUED] insulin regular (HUMULIN R) injection 1-6 Units, 1-6 Units, Subcutaneous, Q6HRS, 6 Units at 12/11/19 1808 **AND** Accu-Chek Q6 if NPO, , , Q6H **AND** NOTIFY MD and PharmD, , , Once **AND** glucose 4 g chewable tablet 16 g, 16 g, Oral, Q15 MIN PRN **AND** DEXTROSE 10% BOLUS 250 mL, 250 mL, Intravenous, Q15 MIN PRN, Gurpreet Varela M.D.  •  vancomycin (VANCOCIN) 700 mg in  mL IVPB, 700 mg, Intravenous, Q8HR, Jinny Sosa D.O., Last Rate: 125 mL/hr at 12/12/19 1159, 700 mg at 12/12/19 1159  •  insulin lispro (HUMALOG) injection 3-14 Units, 3-14 Units, Subcutaneous, 4X/DAY ACHS, 3 Units at 12/12/19 1159 **AND** Accu-Chek ACHS, , , Q AC AND BEDTIME(S) **AND** NOTIFY MD and PharmD, , ,  "Once **AND** glucose 4 g chewable tablet 16 g, 16 g, Oral, Q15 MIN PRN **AND** DEXTROSE 10% BOLUS 250 mL, 250 mL, Intravenous, Q15 MIN PRN, Jinny Sosa D.O.  •  insulin NPH (HUMULIN/NOVOLIN) injection 20 Units, 20 Units, Subcutaneous, BID, Gautam Segura D.O., 20 Units at 19 0622  [unfilled]    Most Recent Vital Signs:  /75   Pulse 82   Temp 36.8 °C (98.2 °F) (Temporal)   Resp 16   Ht 1.626 m (5' 4\")   Wt 61.9 kg (136 lb 7.4 oz)   SpO2 96%   BMI 23.42 kg/m²   Temp  Av.4 °C (97.5 °F)  Min: 35.8 °C (96.5 °F)  Max: 36.8 °C (98.2 °F)    Physical Exam:  General: Nontoxic, no acute distress.  Looks tired  HEENT: sclera anicteric, PERRL, EOMI, MMM, no oral lesions  Neck: supple, no lymphadenopathy  Chest: CTAB, no r/r/w, normal work of breathing.  Cardiac: Regular, no murmurs no gallops heard  Abdomen: + bowel sounds, soft, non-tender, non-distended, no HSM  Extremities: Right thigh bandaged with Hemovac.  Skin: no rashes or erythema  Neuro: Alert and oriented times 3, non-focal exam    Pertinent Lab Results:  Recent Labs     12/10/19  2331 12/11/19  2347   WBC 13.5* 11.7*      Recent Labs     12/10/19  2331 12/11/19  2347   HEMOGLOBIN 14.9 11.8*   HEMATOCRIT 45.9 36.4*   MCV 86.0 86.3   MCH 27.9 28.4   PLATELETCT 510* 426         Recent Labs     12/10/19  2331 12/11/19  2347   SODIUM 136 134*   POTASSIUM 3.6 4.4   CHLORIDE 100 99   CO2 23 24   CREATININE 0.76 1.13        No results for input(s): ALBUMIN in the last 72 hours.    Invalid input(s): AST, ALT, ALKPHOS, BILITOT, TOTALBILIRUB, BILIRUBINTOT, BILIRUBINDIR, BILIRUBININD, ALKALINEPHOS     Pertinent Micro:  Results     Procedure Component Value Units Date/Time    Anaerobic Culture [[id number]] Collected:  19 1002    Order Status:  Completed Specimen:  Bone Updated:  19 1250     Significant Indicator NEG     Source BONE     Site right thigh bone     Culture Result Culture in progress.    Narrative:       Surgery Specimen "    Fungal Culture [[id number]] Collected:  12/11/19 1002    Order Status:  Completed Specimen:  Bone Updated:  12/12/19 1250     Significant Indicator NEG     Source BONE     Site right thigh bone     Culture Result Culture in progress.    Narrative:       Surgery Specimen    AFB Culture [[id number]] Collected:  12/11/19 1002    Order Status:  Completed Specimen:  Bone Updated:  12/12/19 1250     Significant Indicator NEG     Source BONE     Site right thigh bone     Culture Result Culture in progress.     AFB Smear Results No acid fast bacilli seen.    Narrative:       Surgery Specimen    CULTURE TISSUE W/ GRM STAIN [[id number]]  (Abnormal) Collected:  12/11/19 1002    Order Status:  Completed Specimen:  Bone Updated:  12/12/19 1250     Significant Indicator POS     Source BONE     Site right thigh bone     Culture Result -     Gram Stain Result Rare WBCs.  No organisms seen.       Culture Result Lactose fermenting Gram negative gregory  Light growth      Narrative:       Surgery Specimen    Anaerobic Culture [[id number]] Collected:  12/11/19 0959    Order Status:  Completed Specimen:  Body Fluid Updated:  12/12/19 1250     Significant Indicator NEG     Source BF     Site Right thigh abscess     Culture Result Culture in progress.    Narrative:       Surgery Specimen    AFB Culture [[id number]] Collected:  12/11/19 0959    Order Status:  Completed Specimen:  Body Fluid Updated:  12/12/19 1250     Significant Indicator NEG     Source BF     Site Right thigh abscess     Culture Result Culture in progress.     AFB Smear Results No acid fast bacilli seen.    Narrative:       Surgery Specimen    Fungal Culture [[id number]] Collected:  12/11/19 0959    Order Status:  Completed Specimen:  Body Fluid Updated:  12/12/19 1250     Significant Indicator NEG     Source BF     Site Right thigh abscess     Culture Result Culture in progress.    Narrative:       Surgery Specimen    FLUID CULTURE W/GRAM STAIN [[id number]]  (Abnormal)  Collected:  12/11/19 0959    Order Status:  Completed Specimen:  Body Fluid Updated:  12/12/19 1250     Significant Indicator POS     Source BF     Site Right thigh abscess     Culture Result -     Gram Stain Result Many WBCs.  Moderate Gram negative rods.       Culture Result Lactose fermenting Gram negative gregory  Heavy growth      Narrative:       Surgery Specimen    AFB Culture [[id number]] Collected:  12/11/19 1002    Order Status:  Completed Specimen:  Tissue Updated:  12/12/19 1250     Significant Indicator NEG     Source TISS     Site right thigh tissue     Culture Result Culture in progress.     AFB Smear Results No acid fast bacilli seen.    Narrative:       Surgery Specimen    Fungal Culture [[id number]] Collected:  12/11/19 1002    Order Status:  Completed Specimen:  Tissue Updated:  12/12/19 1250     Significant Indicator NEG     Source TISS     Site right thigh tissue     Culture Result Culture in progress.    Narrative:       Surgery Specimen    CULTURE TISSUE W/ GRM STAIN [[id number]]  (Abnormal) Collected:  12/11/19 1002    Order Status:  Completed Specimen:  Tissue Updated:  12/12/19 1250     Significant Indicator POS     Source TISS     Site right thigh tissue     Culture Result -     Gram Stain Result Many WBCs.  No organisms seen.       Culture Result Lactose fermenting Gram negative gregory  Moderate growth      Narrative:       Surgery Specimen    Anaerobic Culture [[id number]] Collected:  12/11/19 1002    Order Status:  Completed Specimen:  Tissue Updated:  12/12/19 1250     Significant Indicator NEG     Source TISS     Site right thigh tissue     Culture Result Culture in progress.    Narrative:       Surgery Specimen    CULTURE TISSUE W/ GRM STAIN [[id number]]  (Abnormal) Collected:  12/11/19 1002    Order Status:  Completed Specimen:  Tissue Updated:  12/12/19 1103     Significant Indicator POS     Source TISS     Site Right thigh periosteum     Culture Result -     Gram Stain Result Many WBCs.  Rare  "Gram negative rods.       Culture Result Lactose fermenting Gram negative gregory  Moderate growth      Narrative:       Surgery Specimen    Anaerobic Culture [[id number]] Collected:  12/11/19 1002    Order Status:  Completed Specimen:  Tissue Updated:  12/12/19 1103     Significant Indicator NEG     Source TISS     Site Right thigh periosteum     Culture Result Culture in progress.    Narrative:       Surgery Specimen    AFB Culture [[id number]] Collected:  12/11/19 1002    Order Status:  Completed Specimen:  Tissue Updated:  12/12/19 1103     Significant Indicator NEG     Source TISS     Site Right thigh periosteum     Culture Result Culture in progress.     AFB Smear Results No acid fast bacilli seen.    Narrative:       Surgery Specimen    Fungal Culture [[id number]] Collected:  12/11/19 1002    Order Status:  Completed Specimen:  Tissue Updated:  12/12/19 1103     Significant Indicator NEG     Source TISS     Site Right thigh periosteum     Culture Result Culture in progress.    Narrative:       Surgery Specimen    BLOOD CULTURE x2 [[id number]] Collected:  12/11/19 0121    Order Status:  Completed Specimen:  Blood from Peripheral Updated:  12/12/19 0923     Significant Indicator NEG     Source BLD     Site PERIPHERAL     Culture Result No Growth  Note: Blood cultures are incubated for 5 days and  are monitored continuously.Positive blood cultures  are called to the RN and reported as soon as  they are identified.      Narrative:       Per Hospital Policy: Only change Specimen Src: to \"Line\" if  specified by physician order.  No site indicated    BLOOD CULTURE x2 [[id number]] Collected:  12/11/19 0121    Order Status:  Completed Specimen:  Blood from Peripheral Updated:  12/12/19 0923     Significant Indicator NEG     Source BLD     Site PERIPHERAL     Culture Result No Growth  Note: Blood cultures are incubated for 5 days and  are monitored continuously.Positive blood cultures  are called to the RN and reported as soon " "as  they are identified.      Narrative:       Per Hospital Policy: Only change Specimen Src: to \"Line\" if  specified by physician order.  No site indicated    Acid Fast Stain [[id number]] Collected:  12/11/19 1002    Order Status:  Completed Specimen:  Tissue Updated:  12/11/19 1847     Significant Indicator NEG     Source TISS     Site Right thigh periosteum     AFB Smear Results No acid fast bacilli seen.    Narrative:       Surgery Specimen    GRAM STAIN [[id number]] Collected:  12/11/19 1002    Order Status:  Completed Specimen:  Bone Updated:  12/11/19 1847     Significant Indicator .     Source BONE     Site right thigh bone     Gram Stain Result Rare WBCs.  No organisms seen.      Narrative:       Surgery Specimen    GRAM STAIN [[id number]] Collected:  12/11/19 1002    Order Status:  Completed Specimen:  Tissue Updated:  12/11/19 1847     Significant Indicator .     Source TISS     Site Right thigh periosteum     Gram Stain Result Many WBCs.  Rare Gram negative rods.      Narrative:       Surgery Specimen    Acid Fast Stain [[id number]] Collected:  12/11/19 0959    Order Status:  Completed Specimen:  Body Fluid Updated:  12/11/19 1847     Significant Indicator NEG     Source BF     Site Right thigh abscess     AFB Smear Results No acid fast bacilli seen.    Narrative:       Surgery Specimen    Acid Fast Stain [[id number]] Collected:  12/11/19 1002    Order Status:  Completed Specimen:  Bone Updated:  12/11/19 1847     Significant Indicator NEG     Source BONE     Site right thigh bone     AFB Smear Results No acid fast bacilli seen.    Narrative:       Surgery Specimen    GRAM STAIN [[id number]] Collected:  12/11/19 0959    Order Status:  Completed Specimen:  Body Fluid Updated:  12/11/19 1847     Significant Indicator .     Source BF     Site Right thigh abscess     Gram Stain Result Many WBCs.  Moderate Gram negative rods.      Narrative:       Surgery Specimen    Acid Fast Stain [[id number]] Collected:  12/11/19 " 1002    Order Status:  Completed Specimen:  Tissue Updated:  12/11/19 1846     Significant Indicator NEG     Source TISS     Site right thigh tissue     AFB Smear Results No acid fast bacilli seen.    Narrative:       Surgery Specimen    GRAM STAIN [[id number]] Collected:  12/11/19 1002    Order Status:  Completed Specimen:  Tissue Updated:  12/11/19 1846     Significant Indicator .     Source TISS     Site right thigh tissue     Gram Stain Result Many WBCs.  No organisms seen.      Narrative:       Surgery Specimen        No results found for: BLOODCULTU, BLDCULT, BCHOLD     Studies:  Ct-extremity, Lower With Right    Result Date: 12/11/2019 12/11/2019 12:15 AM HISTORY/REASON FOR EXAM:  Right thigh swelling. Prior osteomyelitis.. TECHNIQUE/EXAM DESCRIPTION AND NUMBER OF VIEWS:  CT scan of the RIGHT lower extremity with contrast, with reconstructions. Thin helical 3 mm sections were obtained from the distal femur through the proximal tibia/fibula. Sagittal and coronal multiplanar reconstructions were generated from the axial images. A total of 100 mL of Omnipaque 350 nonionic contrast was administered  IV without complication. Up to date radiation dose reduction adjustments have been utilized to meet ALARA standards for radiation dose reduction. COMPARISON: None. FINDINGS: There is periosteal reaction with cortical irregularity and cortical defect involving the distal femoral diaphysis posterolaterally. There are linear lucent areas extending through the cortex into the medullary cavity in those areas possibly representing  prior bone biopsy or tracks from prior fixation hardware. There is some mottled bone density involving the intramedullary area extending proximal and distal to that area. Within the overlying soft tissues in that area, there is a large multiloculated rim-enhancing fluid collection which extends into the vastus lateralis and vastus intermedius muscles. This largest component of this multiloculated  fluid collection is located posterolaterally and measures 7 x 5 x 4 cm in size. There are other smaller components located anteriorly and posteriorly distal to the larger abscess. There are also small pockets of gas present within the distal posterior fluid collection. No significant knee or hip joint effusion.     1.  Cortical irregularity and breakthrough involving the distal femoral diaphysis posterolaterally with overlying periosteal reaction most likely representing osteomyelitis. This less likely represents a neoplastic process. There are linear lucent areas extending into that region possibly representing prior biopsy versus tracks from prior fixation hardware. 2.  Multiloculated rim-enhancing fluid collections within the soft tissues of the thigh in the area of the above-described abnormality. The largest collection is located intramuscular within the vastus lateralis muscle and measures 7 x 5 x 4 cm in size. Other components extend into the vastus intermedialis muscle. Some of these also contain gas and most likely represent abscesses.  IMPRESSION:   Osteomyelitis of the femur  Right thigh abscess  Leukocytosis  Uncontrolled diabetes      PLAN:   Tonya Bro is a 39 y.o. female with uncontrolled diabetes and recent admission for osteomyelitis of the femur.  She apparently got 4 weeks of IV antibiotics.  She is again being admitted with right thigh abscess as well as osteomyelitis.  She underwent surgery on 12/10/2019.  As per patient she had Klebsiella infection in September.  We will try to get the records from [facility] in [facility].  Discontinue Unasyn and vancomycin.  Start the patient on Zosyn.  Control the blood sugars.  Continue with the supportive measures.    Discussed with IM. Will continue to follow    Carolina Kinney M.D.     Please note that this dictation was created using voice recognition software. I have worked with technical experts from [facility] to optimize the  interface.  I have made every reasonable attempt to correct obvious errors, but there may be errors of grammar and possibly content that I did not discover before finalizing the note.

## 2019-12-12 NOTE — PROGRESS NOTES
[facility] Medicine Daily Progress Note    Date of Service  12/12/2019    Chief Complaint  39 y.o. female admitted 12/10/2019 with right leg pain.    Hospital Course    This is a 39-year-old female with a past medical history of diabetes type 2 and recent treatment for right thigh osteomyelitis in [place] admitted with right leg pain.  The patient was found to have right femur osteomyelitis with abscess on CT scan.  Orthopedic surgery was consulted and the patient underwent I&D on 12/11/2019.  ID is following for antibiotics.      Interval Problem Update  12/12:  Cultures growing LFGR.  Afebrile.  BP soft.  Continue IV hydration.  Continues to complain of uncontrolled pain.  Medications adjusted.  Surgical site with dressing C/D/I and HV drain in place.  Blood sugars uncontrolled.  Insulin adjusted.     Consultants/Specialty  Ortho surgery  ID    Code Status  FULL    Disposition  TBD.    Review of Systems  Review of Systems   Constitutional: Positive for malaise/fatigue. Negative for chills and fever.   HENT: Negative for congestion.    Eyes: Negative for blurred vision and double vision.   Respiratory: Negative for cough and shortness of breath.    Cardiovascular: Negative for chest pain, palpitations and leg swelling.   Gastrointestinal: Negative for abdominal pain, constipation, diarrhea, nausea and vomiting.   Genitourinary: Negative for dysuria.   Musculoskeletal: Positive for joint pain and myalgias.   Skin: Negative for itching and rash.   Neurological: Positive for focal weakness. Negative for dizziness, tingling, tremors, sensory change, speech change and headaches.        Physical Exam  Temp:  [35.8 °C (96.5 °F)-36.7 °C (98 °F)] 36.5 °C (97.7 °F)  Pulse:  [] 88  Resp:  [12-17] 16  BP: ()/(55-96) 94/55  SpO2:  [95 %-100 %] 97 %    Physical Exam  Vitals signs and nursing note reviewed.   Constitutional:       General: She is not in acute distress.     Appearance: Normal appearance. She is not  ill-appearing.   HENT:      Head: Normocephalic and atraumatic.      Right Ear: External ear normal.      Left Ear: External ear normal.      Nose: Nose normal. No congestion.      Mouth/Throat:      Mouth: Mucous membranes are moist.      Pharynx: Oropharynx is clear. No oropharyngeal exudate.   Eyes:      General:         Right eye: No discharge.         Left eye: No discharge.      Conjunctiva/sclera: Conjunctivae normal.   Neck:      Musculoskeletal: Normal range of motion.   Cardiovascular:      Rate and Rhythm: Normal rate and regular rhythm.      Pulses: Normal pulses.      Heart sounds: Normal heart sounds. No murmur.   Pulmonary:      Effort: Pulmonary effort is normal. No respiratory distress.      Breath sounds: Normal breath sounds. No wheezing.   Abdominal:      General: Bowel sounds are normal. There is no distension.      Tenderness: There is no tenderness.   Musculoskeletal:         General: Tenderness present.      Right lower leg: No edema.      Left lower leg: No edema.      Comments: Right lower extremity mobility limited by pain.  S/P surgical I&D.    Skin:     General: Skin is warm and dry.      Capillary Refill: Capillary refill takes less than 2 seconds.      Coloration: Skin is not jaundiced.   Neurological:      General: No focal deficit present.      Mental Status: She is alert and oriented to person, place, and time. Mental status is at baseline.      Cranial Nerves: No cranial nerve deficit.   Psychiatric:         Mood and Affect: Mood normal.         Behavior: Behavior normal.         Thought Content: Thought content normal.         Judgment: Judgment normal.         Fluids    Intake/Output Summary (Last 24 hours) at 12/12/2019 0821  Last data filed at 12/12/2019 0803  Gross per 24 hour   Intake 2320 ml   Output 120 ml   Net 2200 ml       Laboratory  Recent Labs     12/10/19  2331 12/11/19  2347   WBC 13.5* 11.7*   RBC 5.34 4.16*   HEMOGLOBIN 14.9 11.8*   HEMATOCRIT 45.9 36.4*   MCV  86.0 86.3   MCH 27.9 28.4   MCHC 32.5* 32.9*   RDW 41.1 40.3   PLATELETCT 510* 426   MPV 9.6 10.0     Recent Labs     12/10/19  2331 12/11/19  2347   SODIUM 136 134*   POTASSIUM 3.6 4.4   CHLORIDE 100 99   CO2 23 24   GLUCOSE 166* 492*   BUN 15 26*   CREATININE 0.76 1.13   CALCIUM 9.6 8.5     Recent Labs     12/10/19  2331   INR 0.99               Imaging  CT-EXTREMITY, LOWER WITH RIGHT   Final Result      1.  Cortical irregularity and breakthrough involving the distal femoral diaphysis posterolaterally with overlying periosteal reaction most likely representing osteomyelitis. This less likely represents a neoplastic process. There are linear lucent areas    extending into that region possibly representing prior biopsy versus tracks from prior fixation hardware.      2.  Multiloculated rim-enhancing fluid collections within the soft tissues of the thigh in the area of the above-described abnormality. The largest collection is located intramuscular within the vastus lateralis muscle and measures 7 x 5 x 4 cm in size.    Other components extend into the vastus intermedialis muscle. Some of these also contain gas and most likely represent abscesses.           Assessment/Plan  * Acute hematogenous osteomyelitis of right femur (HCC)  Assessment & Plan  With associated multiple abscess formation.    S/P I&D on 12/11.  HV in place.  Cultures pending  Anticipate return to OR once cx result for antibiotic cement placement   ID consulted  Continue Unasyn and Vancomycin for now  Tight glycemic control  PT/OT    Dehydration  Assessment & Plan  Elevated lactic acid  Soft BPs  Mild elevation in creatinine  Continue IVF  Control blood sugars.     Sepsis without acute organ dysfunction (HCC)  Assessment & Plan  This is Sepsis Present on admission  SIRS criteria identified on my evaluation include: Tachycardia, with heart rate greater than 90 BPM and Leukocyosis, with WBC greater than 12,000  Source is Osteomyelitis  Sepsis protocol  initiated  Fluid resuscitation ordered per protocol  IV antibiotics as appropriate for source of sepsis  While organ dysfunction may be noted elsewhere in this problem list or in the chart, degree of organ dysfunction does not meet CMS criteria for severe sepsis          Type 2 diabetes mellitus with hyperglycemia, with long-term current use of insulin (HCC)  Assessment & Plan  Uncontrolled.  Hgb A1c 10.3, down from 14.3 in 2018  Start NPH 20 units BID  SSI  Diabetic diet  Accuchecks           VTE prophylaxis: SCDs.

## 2019-12-12 NOTE — PROGRESS NOTES
To stay on time with medication schedule discussed vancomycin with [facility] pharmacy [phone] agree with stat draw now and give after its drawn.    Notified phlebotomy of stat draw [id number].

## 2019-12-12 NOTE — CARE PLAN
Problem: Communication  Goal: The ability to communicate needs accurately and effectively will improve  Outcome: PROGRESSING AS EXPECTED     Problem: Safety  Goal: Will remain free from injury  Outcome: PROGRESSING AS EXPECTED  Goal: Will remain free from falls  Outcome: PROGRESSING AS EXPECTED     Patient communicates needs effectively and utilizes call light appropriately.

## 2019-12-12 NOTE — DISCHARGE PLANNING
Patient is eligible for Medicaid Meds to Beds at discharge if they have coverage with [place] Medicaid, Medicaid FFS, Medicaid HMO ([facility]), or [place]. This service is provided through the [facility] if orders are received prior to 4 p.m. Monday through Friday, excluding holidays. Please call [phone] prior to discharge.

## 2019-12-12 NOTE — PROGRESS NOTES
"Pharmacy Kinetics 39 y.o. female on vancomycin day # 2 2019    Currently on Vancomycin 700 mg iv q8hr   (0500, 1300, 2100)  Provider specified end date: TBD    Indication for Treatment: SSTI/osteomyelitis    Pertinent history per medical record: Admitted on 12/10/2019 for pain and swelling of right thigh. Soreness after working out but soreness in right leg only got worse. While in [place] in September, was diagnosed with osteomyelitis of the right distal femur, received 6 weeks of antibiotics. Pt states she had a klebsiella infection. Denies IV drug use. History of diabetes. Denies fever, chills, N/V. No history of vancomycin at this facility. Orthopedics has been consulted and performed I&D.  ID consulted.    Other antibiotics: ampicillin/sulbactam 3g IV q6h    Allergies: Bloodless     List concerns for renal function: recent IV contrast, BUN/SCr ratio > 20:1    Pertinent cultures to date:   19: r thigh tissue - LF-GNR  19: Blood x2 - NGTD    MRSA nares swab if pneumonia is a concern (ordered/positive/negative/n-a): N/A    Recent Labs     12/10/19  2331 19  2347   WBC 13.5* 11.7*   NEUTSPOLYS 79.50* 86.10*     Recent Labs     12/10/19  2331 19  2347   BUN 15 26*   CREATININE 0.76 1.13     Recent Labs     19  1157   VANCOTROUGH 18.0       Intake/Output Summary (Last 24 hours) at 2019 1258  Last data filed at 2019 0900  Gross per 24 hour   Intake 1440 ml   Output 70 ml   Net 1370 ml      /75   Pulse 72   Temp 36.8 °C (98.2 °F) (Temporal)   Resp 17   Ht 1.626 m (5' 4\")   Wt 61.9 kg (136 lb 7.4 oz)   SpO2 96%  Temp (24hrs), Av.3 °C (97.3 °F), Min:35.8 °C (96.5 °F), Max:36.8 °C (98.2 °F)      A/P   1. Vancomycin dose change: 1000mg IV q12h  (0000, 1200)  2. Next vancomycin level: ~2-3 days  (not ordered)  3. Goal trough: 12-16 mcg/mL  4. Comments: Cultures are growing a LF-GNR. Renal function has worsened per renal indices, likely d/t IV contrast. Trough " level is supratherapeutic and likely close to steady state. Given trough and renal function - reduce duration to q12h with ~16mg/kg. Recommend a trough level in a few days to assess accumulation and dose appropriateness. Pharmacy to continue to follow ID recommendations.      Amanda Cunningham, PharmD., BCPS

## 2019-12-12 NOTE — THERAPY
"Occupational Therapy Evaluation completed.   Functional Status: Pt is a 40 y/o female admitted w/ R thigh abscess, found to have femur osteomyelitis s/p I&D. Awaiting further surgery for antibiotic cement put in. She was pleasant and cooperative, went thru this back in Sept. Supv functional mobility. Supv functional txfs. Debi LB dressing. Supv grooming in stance at the sink. SHe is limited by pain and impaired balance during higher level ADL/IADL. Will likely follow up after next surgery to address any needs.  Plan of Care: Will benefit from Occupational Therapy 2 times per week  Discharge Recommendations:  Equipment: Will Continue to Assess for Equipment Needs. Likely Anticipate that the patient will have no further occupational therapy needs after discharge from the hospital.       See \"Rehab Therapy-Acute\" Patient Summary Report for complete documentation.    "

## 2019-12-13 LAB
ANION GAP SERPL CALC-SCNC: 8 MMOL/L (ref 0–11.9)
BACTERIA FLD AEROBE CULT: ABNORMAL
BACTERIA FLD AEROBE CULT: ABNORMAL
BACTERIA TISS AEROBE CULT: ABNORMAL
BASOPHILS # BLD AUTO: 0.6 % (ref 0–1.8)
BASOPHILS # BLD: 0.06 K/UL (ref 0–0.12)
BUN SERPL-MCNC: 14 MG/DL (ref 8–22)
CALCIUM SERPL-MCNC: 9.4 MG/DL (ref 8.5–10.5)
CHLORIDE SERPL-SCNC: 105 MMOL/L (ref 96–112)
CO2 SERPL-SCNC: 27 MMOL/L (ref 20–33)
CREAT SERPL-MCNC: 0.99 MG/DL (ref 0.5–1.4)
EOSINOPHIL # BLD AUTO: 0.14 K/UL (ref 0–0.51)
EOSINOPHIL NFR BLD: 1.5 % (ref 0–6.9)
ERYTHROCYTE [DISTWIDTH] IN BLOOD BY AUTOMATED COUNT: 42.9 FL (ref 35.9–50)
GLUCOSE BLD-MCNC: 126 MG/DL (ref 65–99)
GLUCOSE BLD-MCNC: 153 MG/DL (ref 65–99)
GLUCOSE BLD-MCNC: 225 MG/DL (ref 65–99)
GLUCOSE SERPL-MCNC: 161 MG/DL (ref 65–99)
GRAM STN SPEC: ABNORMAL
HCT VFR BLD AUTO: 36.7 % (ref 37–47)
HGB BLD-MCNC: 11.4 G/DL (ref 12–16)
IMM GRANULOCYTES # BLD AUTO: 0.07 K/UL (ref 0–0.11)
IMM GRANULOCYTES NFR BLD AUTO: 0.8 % (ref 0–0.9)
LYMPHOCYTES # BLD AUTO: 3.3 K/UL (ref 1–4.8)
LYMPHOCYTES NFR BLD: 35.7 % (ref 22–41)
MCH RBC QN AUTO: 28.1 PG (ref 27–33)
MCHC RBC AUTO-ENTMCNC: 31.1 G/DL (ref 33.6–35)
MCV RBC AUTO: 90.4 FL (ref 81.4–97.8)
MONOCYTES # BLD AUTO: 0.69 K/UL (ref 0–0.85)
MONOCYTES NFR BLD AUTO: 7.5 % (ref 0–13.4)
NEUTROPHILS # BLD AUTO: 4.98 K/UL (ref 2–7.15)
NEUTROPHILS NFR BLD: 53.9 % (ref 44–72)
NRBC # BLD AUTO: 0 K/UL
NRBC BLD-RTO: 0 /100 WBC
PLATELET # BLD AUTO: 471 K/UL (ref 164–446)
PMV BLD AUTO: 10.2 FL (ref 9–12.9)
POTASSIUM SERPL-SCNC: 4.1 MMOL/L (ref 3.6–5.5)
RBC # BLD AUTO: 4.06 M/UL (ref 4.2–5.4)
SIGNIFICANT IND 70042: ABNORMAL
SITE SITE: ABNORMAL
SODIUM SERPL-SCNC: 140 MMOL/L (ref 135–145)
SOURCE SOURCE: ABNORMAL
WBC # BLD AUTO: 9.2 K/UL (ref 4.8–10.8)

## 2019-12-13 PROCEDURE — 700105 HCHG RX REV CODE 258: Performed by: INTERNAL MEDICINE

## 2019-12-13 PROCEDURE — B54MZZA ULTRASONOGRAPHY OF RIGHT UPPER EXTREMITY VEINS, GUIDANCE: ICD-10-PCS | Performed by: INTERNAL MEDICINE

## 2019-12-13 PROCEDURE — 700111 HCHG RX REV CODE 636 W/ 250 OVERRIDE (IP): Performed by: NURSE PRACTITIONER

## 2019-12-13 PROCEDURE — 700102 HCHG RX REV CODE 250 W/ 637 OVERRIDE(OP): Performed by: INTERNAL MEDICINE

## 2019-12-13 PROCEDURE — 700111 HCHG RX REV CODE 636 W/ 250 OVERRIDE (IP): Performed by: INTERNAL MEDICINE

## 2019-12-13 PROCEDURE — 85025 COMPLETE CBC W/AUTO DIFF WBC: CPT

## 2019-12-13 PROCEDURE — A9270 NON-COVERED ITEM OR SERVICE: HCPCS | Performed by: INTERNAL MEDICINE

## 2019-12-13 PROCEDURE — 700102 HCHG RX REV CODE 250 W/ 637 OVERRIDE(OP): Performed by: HOSPITALIST

## 2019-12-13 PROCEDURE — 36415 COLL VENOUS BLD VENIPUNCTURE: CPT

## 2019-12-13 PROCEDURE — A9270 NON-COVERED ITEM OR SERVICE: HCPCS | Performed by: HOSPITALIST

## 2019-12-13 PROCEDURE — 99233 SBSQ HOSP IP/OBS HIGH 50: CPT | Performed by: INTERNAL MEDICINE

## 2019-12-13 PROCEDURE — 82962 GLUCOSE BLOOD TEST: CPT

## 2019-12-13 PROCEDURE — 770006 HCHG ROOM/CARE - MED/SURG/GYN SEMI*

## 2019-12-13 PROCEDURE — 02HV33Z INSERTION OF INFUSION DEVICE INTO SUPERIOR VENA CAVA, PERCUTANEOUS APPROACH: ICD-10-PCS | Performed by: INTERNAL MEDICINE

## 2019-12-13 PROCEDURE — 80048 BASIC METABOLIC PNL TOTAL CA: CPT

## 2019-12-13 RX ADMIN — INSULIN HUMAN 20 UNITS: 100 INJECTION, SUSPENSION SUBCUTANEOUS at 17:01

## 2019-12-13 RX ADMIN — CEFTRIAXONE SODIUM 2 G: 2 INJECTION, POWDER, FOR SOLUTION INTRAMUSCULAR; INTRAVENOUS at 17:03

## 2019-12-13 RX ADMIN — SENNOSIDES AND DOCUSATE SODIUM 2 TABLET: 8.6; 5 TABLET ORAL at 16:41

## 2019-12-13 RX ADMIN — OXYCODONE HYDROCHLORIDE 10 MG: 10 TABLET ORAL at 17:40

## 2019-12-13 RX ADMIN — OXYCODONE HYDROCHLORIDE 10 MG: 10 TABLET ORAL at 14:15

## 2019-12-13 RX ADMIN — OXYCODONE HYDROCHLORIDE 10 MG: 10 TABLET ORAL at 21:23

## 2019-12-13 RX ADMIN — PIPERACILLIN AND TAZOBACTAM 4.5 G: 4; .5 INJECTION, POWDER, LYOPHILIZED, FOR SOLUTION INTRAVENOUS; PARENTERAL at 13:00

## 2019-12-13 RX ADMIN — MORPHINE SULFATE 4 MG: 4 INJECTION INTRAVENOUS at 12:55

## 2019-12-13 RX ADMIN — MORPHINE SULFATE 4 MG: 4 INJECTION INTRAVENOUS at 16:41

## 2019-12-13 RX ADMIN — INSULIN LISPRO 3 UNITS: 100 INJECTION, SOLUTION INTRAVENOUS; SUBCUTANEOUS at 17:02

## 2019-12-13 ASSESSMENT — ENCOUNTER SYMPTOMS
SPEECH CHANGE: 0
COUGH: 0
MYALGIAS: 1
SENSORY CHANGE: 0
VOMITING: 0
SHORTNESS OF BREATH: 0
NAUSEA: 0
FEVER: 0

## 2019-12-13 NOTE — PROGRESS NOTES
Patient having better pain control with prn oxycodone however needed last dose prior to 4 hour window.  D/w Dr. Sosa on the unit, orders given to increase frequency from q4 hours to q3 hours.

## 2019-12-13 NOTE — PROGRESS NOTES
Due to Epic downtime, please see patients paper chart for record of events 1900 12/12/19 - 12/13/19 at 1437.   Which may include but not limited to medication administration, progress notes, orders, assessments, lab work, imaging and additional information.

## 2019-12-13 NOTE — CARE PLAN
Problem: Safety  Goal: Will remain free from injury  Outcome: PROGRESSING SLOWER THAN EXPECTED  Discussed the importance of using the call bell and waiting for staff assistance.  Also d/w patient and provider that patient should not disconnect PIV.       Problem: Pain Management  Goal: Pain level will decrease to patient's comfort goal  Outcome: PROGRESSING SLOWER THAN EXPECTED   Patient aware of pain management, see paper chart for earlier discussion regarding oral medication prior to IV medication.

## 2019-12-14 ENCOUNTER — APPOINTMENT (OUTPATIENT)
Dept: RADIOLOGY | Facility: MEDICAL CENTER | Age: 39
DRG: 854 | End: 2019-12-14
Attending: INTERNAL MEDICINE
Payer: COMMERCIAL

## 2019-12-14 LAB
BACTERIA SPEC ANAEROBE CULT: NORMAL
GLUCOSE BLD-MCNC: 173 MG/DL (ref 65–99)
GLUCOSE BLD-MCNC: 193 MG/DL (ref 65–99)
GLUCOSE BLD-MCNC: 194 MG/DL (ref 65–99)
GLUCOSE BLD-MCNC: 205 MG/DL (ref 65–99)
GLUCOSE BLD-MCNC: 226 MG/DL (ref 65–99)
SIGNIFICANT IND 70042: NORMAL
SITE SITE: NORMAL
SOURCE SOURCE: NORMAL

## 2019-12-14 PROCEDURE — 700111 HCHG RX REV CODE 636 W/ 250 OVERRIDE (IP): Performed by: INTERNAL MEDICINE

## 2019-12-14 PROCEDURE — 99233 SBSQ HOSP IP/OBS HIGH 50: CPT | Performed by: INTERNAL MEDICINE

## 2019-12-14 PROCEDURE — A9270 NON-COVERED ITEM OR SERVICE: HCPCS | Performed by: HOSPITALIST

## 2019-12-14 PROCEDURE — A9270 NON-COVERED ITEM OR SERVICE: HCPCS | Performed by: INTERNAL MEDICINE

## 2019-12-14 PROCEDURE — 700102 HCHG RX REV CODE 250 W/ 637 OVERRIDE(OP): Performed by: INTERNAL MEDICINE

## 2019-12-14 PROCEDURE — 770006 HCHG ROOM/CARE - MED/SURG/GYN SEMI*

## 2019-12-14 PROCEDURE — 36573 INSJ PICC RS&I 5 YR+: CPT

## 2019-12-14 PROCEDURE — 700111 HCHG RX REV CODE 636 W/ 250 OVERRIDE (IP): Performed by: NURSE PRACTITIONER

## 2019-12-14 PROCEDURE — 700105 HCHG RX REV CODE 258: Performed by: INTERNAL MEDICINE

## 2019-12-14 PROCEDURE — 700102 HCHG RX REV CODE 250 W/ 637 OVERRIDE(OP): Performed by: HOSPITALIST

## 2019-12-14 PROCEDURE — 82962 GLUCOSE BLOOD TEST: CPT | Mod: 91

## 2019-12-14 PROCEDURE — 99232 SBSQ HOSP IP/OBS MODERATE 35: CPT | Performed by: INTERNAL MEDICINE

## 2019-12-14 RX ORDER — KETOROLAC TROMETHAMINE 30 MG/ML
15 INJECTION, SOLUTION INTRAMUSCULAR; INTRAVENOUS EVERY 6 HOURS PRN
Status: DISCONTINUED | OUTPATIENT
Start: 2019-12-14 | End: 2019-12-18 | Stop reason: HOSPADM

## 2019-12-14 RX ADMIN — OXYCODONE HYDROCHLORIDE 10 MG: 10 TABLET ORAL at 00:42

## 2019-12-14 RX ADMIN — CEFTRIAXONE SODIUM 2 G: 2 INJECTION, POWDER, FOR SOLUTION INTRAMUSCULAR; INTRAVENOUS at 05:51

## 2019-12-14 RX ADMIN — OXYCODONE HYDROCHLORIDE 10 MG: 10 TABLET ORAL at 09:01

## 2019-12-14 RX ADMIN — INSULIN LISPRO 3 UNITS: 100 INJECTION, SOLUTION INTRAVENOUS; SUBCUTANEOUS at 08:05

## 2019-12-14 RX ADMIN — OXYCODONE HYDROCHLORIDE 10 MG: 10 TABLET ORAL at 05:52

## 2019-12-14 RX ADMIN — OXYCODONE HYDROCHLORIDE 10 MG: 10 TABLET ORAL at 17:31

## 2019-12-14 RX ADMIN — INSULIN LISPRO 4 UNITS: 100 INJECTION, SOLUTION INTRAVENOUS; SUBCUTANEOUS at 21:17

## 2019-12-14 RX ADMIN — INSULIN LISPRO 3 UNITS: 100 INJECTION, SOLUTION INTRAVENOUS; SUBCUTANEOUS at 17:36

## 2019-12-14 RX ADMIN — MORPHINE SULFATE 4 MG: 4 INJECTION INTRAVENOUS at 02:26

## 2019-12-14 RX ADMIN — OXYCODONE HYDROCHLORIDE 10 MG: 10 TABLET ORAL at 12:31

## 2019-12-14 RX ADMIN — SENNOSIDES AND DOCUSATE SODIUM 2 TABLET: 8.6; 5 TABLET ORAL at 05:52

## 2019-12-14 RX ADMIN — MORPHINE SULFATE 4 MG: 4 INJECTION INTRAVENOUS at 15:07

## 2019-12-14 RX ADMIN — INSULIN HUMAN 20 UNITS: 100 INJECTION, SUSPENSION SUBCUTANEOUS at 17:36

## 2019-12-14 RX ADMIN — SENNOSIDES AND DOCUSATE SODIUM 2 TABLET: 8.6; 5 TABLET ORAL at 17:34

## 2019-12-14 RX ADMIN — INSULIN LISPRO 4 UNITS: 100 INJECTION, SOLUTION INTRAVENOUS; SUBCUTANEOUS at 12:28

## 2019-12-14 RX ADMIN — INSULIN HUMAN 20 UNITS: 100 INJECTION, SUSPENSION SUBCUTANEOUS at 06:45

## 2019-12-14 ASSESSMENT — ENCOUNTER SYMPTOMS
DIZZINESS: 0
BLURRED VISION: 0
SORE THROAT: 0
SHORTNESS OF BREATH: 0
BACK PAIN: 0
NAUSEA: 0
TINGLING: 0
COUGH: 0
SPEECH CHANGE: 0
ABDOMINAL PAIN: 0
VOMITING: 0
SENSORY CHANGE: 0
FOCAL WEAKNESS: 0
MYALGIAS: 1
DIARRHEA: 0
FEVER: 0
HEADACHES: 0
TREMORS: 0

## 2019-12-14 NOTE — PROGRESS NOTES
PICC placement is confirmed using 3CG technology and Xray, PICC is ok to use. Able to get blood return with arm raised, RN made aware.

## 2019-12-14 NOTE — PROCEDURES
Vascular Access Team     Date of Insertion: 12/14/19  Arm Circumference: 26.5  Internal length: 45  External Length: 0  Vein Occupancy %: 35   Reason for PICC: antibiotics  Labs: WBC 9.2, , BUN 14, Cr 0.99, GFR >60, INR none     Consents confirmed, vessel patency confirmed with ultrasound. Risks and benefits of procedure explained to patient and education regarding central line associated bloodstream infections provided. Questions answered.      PICC placed in RUE per licensed provider order with ultrasound guidance.  4 Fr, 01 lumen PICC placed in brachial vein after 01 attempt(s). 2 mL of 1% lidocaine injected intradermally, 21 gauge microintroducer needle and modified Seldinger technique used. 40 cm catheter inserted with good blood return. Secured at 2 cm marker. Each lumen flushed without resistance with 10 mL 0.9% normal saline. PICC line secured with Biopatch and Tegaderm.     PICC tip placement location is confirmed by nurse to be in the Superior Vena Cava (SVC) utilizing 3CG technology. PICC line is appropriate for use at this time. Patient tolerated procedure well, without complications.  Patient condition relayed to unit RN or ordering physician via this post procedure note in the EMR.      Ultrasound images uploaded to PACS and viewable in the EMR - yes  Ultrasound imaged printed and placed in paper chart - no     [employer] Power PICC ref # [id number], Lot # [id number]

## 2019-12-14 NOTE — PROGRESS NOTES
Infectious Disease Progress Note    Author: Carolina Kinney M.D. Date & Time of service: 2019  4:40 PM    Chief Complaint:    Right thigh pain  Interval History:  2019-no fevers.  WBC is 9.2.  Has pain in the thigh.  The cultures have come back positive for Klebsiella  Labs Reviewed and Medications Reviewed.    Review of Systems:  Review of Systems   Constitutional: Positive for malaise/fatigue. Negative for fever.   HENT: Negative for hearing loss.    Respiratory: Negative for cough and shortness of breath.    Cardiovascular: Negative for chest pain and leg swelling.   Gastrointestinal: Negative for nausea and vomiting.   Genitourinary: Negative for dysuria.   Musculoskeletal: Positive for myalgias.   Neurological: Negative for sensory change and speech change.       Hemodynamics:  Temp (24hrs), Av.3 °C (99.1 °F), Min:37.3 °C (99.1 °F), Max:37.3 °C (99.1 °F)  Temperature: 37.3 °C (99.1 °F)  Pulse  Av.5  Min: 58  Max: 119   Blood Pressure: 118/79       Physical Exam:  Physical Exam  Vitals signs reviewed.   Constitutional:       Appearance: Normal appearance. She is normal weight.   HENT:      Head: Normocephalic and atraumatic.      Right Ear: External ear normal.      Left Ear: External ear normal.      Nose: Nose normal. No congestion.      Mouth/Throat:      Mouth: Mucous membranes are moist.      Pharynx: No oropharyngeal exudate.   Eyes:      General: No scleral icterus.     Pupils: Pupils are equal, round, and reactive to light.   Neck:      Musculoskeletal: Neck supple.   Cardiovascular:      Rate and Rhythm: Normal rate and regular rhythm.      Heart sounds: Normal heart sounds. No murmur.   Pulmonary:      Effort: Pulmonary effort is normal. No respiratory distress.      Breath sounds: No wheezing.   Abdominal:      General: Bowel sounds are normal. There is no distension.      Palpations: Abdomen is soft.      Tenderness: There is no tenderness. There is no guarding.    Musculoskeletal:         General: Swelling and tenderness present. No deformity.      Comments:  Rt Leg bandaged   Lymphadenopathy:      Cervical: No cervical adenopathy.   Skin:     General: Skin is warm and dry.      Findings: No erythema.   Neurological:      General: No focal deficit present.      Mental Status: She is alert and oriented to person, place, and time.   Psychiatric:         Mood and Affect: Mood normal.         Meds:    Current Facility-Administered Medications:   •  Notify provider if pain remains uncontrolled **AND** Use the numeric rating scale (NRS-11) on regular floors and Critical-Care Pain Observation Tool (CPOT) on ICUs/Trauma to assess pain **AND** Pulse Ox (Oximetry) **AND** Pharmacy Consult Request **AND** If patient difficult to arouse and/or has respiratory depression, stop any opiates that are currently infusing and call a Rapid Response. **AND** [DISCONTINUED] oxyCODONE immediate-release **AND** [DISCONTINUED] oxyCODONE immediate-release **AND** morphine injection  •  cyclobenzaprine  •  oxyCODONE immediate-release **OR** oxyCODONE immediate-release  •  [COMPLETED] piperacillin-tazobactam **AND** piperacillin-tazobactam  •  acetaminophen  •  cloNIDine  •  ondansetron  •  ondansetron  •  promethazine  •  promethazine  •  prochlorperazine  •  senna-docusate **AND** polyethylene glycol/lytes **AND** magnesium hydroxide **AND** bisacodyl  •  LR  •  LR  •  [DISCONTINUED] insulin regular **AND** Accu-Chek Q6 if NPO **AND** NOTIFY MD and PharmD **AND** glucose **AND** dextrose 10% bolus  •  insulin lispro **AND** Accu-Chek ACHS **AND** NOTIFY MD and PharmD **AND** glucose **AND** dextrose 10% bolus  •  insulin NPH    Labs:  Recent Labs     12/10/19  2331 12/11/19  2347 12/13/19  0104   WBC 13.5* 11.7* 9.2   RBC 5.34 4.16* 4.06*   HEMOGLOBIN 14.9 11.8* 11.4*   HEMATOCRIT 45.9 36.4* 36.7*   MCV 86.0 86.3 90.4   MCH 27.9 28.4 28.1   RDW 41.1 40.3 42.9   PLATELETCT 510* 426 471*   MPV 9.6  10.0 10.2   NEUTSPOLYS 79.50* 86.10* 53.90   LYMPHOCYTES 12.90* 7.50* 35.70   MONOCYTES 6.70 6.00 7.50   EOSINOPHILS 0.20 0.00 1.50   BASOPHILS 0.40 0.10 0.60     Recent Labs     12/10/19  2331 12/11/19  2347 12/13/19  0104   SODIUM 136 134* 140   POTASSIUM 3.6 4.4 4.1   CHLORIDE 100 99 105   CO2 23 24 27   GLUCOSE 166* 492* 161*   BUN 15 26* 14   CPKTOTAL <10  --   --      Recent Labs     12/10/19  2331 12/11/19  2347 12/13/19  0104   CREATININE 0.76 1.13 0.99       Imaging:  Ct-extremity, Lower With Right    Result Date: 12/11/2019 12/11/2019 12:15 AM HISTORY/REASON FOR EXAM:  Right thigh swelling. Prior osteomyelitis.. TECHNIQUE/EXAM DESCRIPTION AND NUMBER OF VIEWS:  CT scan of the RIGHT lower extremity with contrast, with reconstructions. Thin helical 3 mm sections were obtained from the distal femur through the proximal tibia/fibula. Sagittal and coronal multiplanar reconstructions were generated from the axial images. A total of 100 mL of Omnipaque 350 nonionic contrast was administered  IV without complication. Up to date radiation dose reduction adjustments have been utilized to meet ALARA standards for radiation dose reduction. COMPARISON: None. FINDINGS: There is periosteal reaction with cortical irregularity and cortical defect involving the distal femoral diaphysis posterolaterally. There are linear lucent areas extending through the cortex into the medullary cavity in those areas possibly representing  prior bone biopsy or tracks from prior fixation hardware. There is some mottled bone density involving the intramedullary area extending proximal and distal to that area. Within the overlying soft tissues in that area, there is a large multiloculated rim-enhancing fluid collection which extends into the vastus lateralis and vastus intermedius muscles. This largest component of this multiloculated fluid collection is located posterolaterally and measures 7 x 5 x 4 cm in size. There are other smaller  components located anteriorly and posteriorly distal to the larger abscess. There are also small pockets of gas present within the distal posterior fluid collection. No significant knee or hip joint effusion.     1.  Cortical irregularity and breakthrough involving the distal femoral diaphysis posterolaterally with overlying periosteal reaction most likely representing osteomyelitis. This less likely represents a neoplastic process. There are linear lucent areas extending into that region possibly representing prior biopsy versus tracks from prior fixation hardware. 2.  Multiloculated rim-enhancing fluid collections within the soft tissues of the thigh in the area of the above-described abnormality. The largest collection is located intramuscular within the vastus lateralis muscle and measures 7 x 5 x 4 cm in size. Other components extend into the vastus intermedialis muscle. Some of these also contain gas and most likely represent abscesses.      Micro:  Results     Procedure Component Value Units Date/Time    CULTURE TISSUE W/ GRM STAIN [[id number]]  (Abnormal) Collected:  12/11/19 1002    Order Status:  Completed Specimen:  Tissue Updated:  12/13/19 0909     Significant Indicator POS     Source TISS     Site right thigh tissue     Culture Result -     Gram Stain Result Many WBCs.  No organisms seen.       Culture Result Klebsiella pneumoniae  Moderate growth  See previous culture for sensitivity report.      Narrative:       Surgery Specimen    Anaerobic Culture [[id number]] Collected:  12/11/19 1002    Order Status:  Completed Specimen:  Tissue Updated:  12/13/19 0909     Significant Indicator NEG     Source TISS     Site right thigh tissue     Culture Result Culture in progress.    Narrative:       Surgery Specimen    AFB Culture [[id number]] Collected:  12/11/19 1002    Order Status:  Completed Specimen:  Tissue Updated:  12/13/19 0909     Significant Indicator NEG     Source TISS     Site right thigh tissue      Culture Result Culture in progress.     AFB Smear Results No acid fast bacilli seen.    Narrative:       Surgery Specimen    Fungal Culture [[id number]] Collected:  12/11/19 1002    Order Status:  Completed Specimen:  Tissue Updated:  12/13/19 0909     Significant Indicator NEG     Source TISS     Site right thigh tissue     Culture Result Culture in progress.    Narrative:       Surgery Specimen    AFB Culture [[id number]] Collected:  12/11/19 0959    Order Status:  Completed Specimen:  Body Fluid Updated:  12/13/19 0908     Significant Indicator NEG     Source BF     Site Right thigh abscess     Culture Result Culture in progress.     AFB Smear Results No acid fast bacilli seen.    Narrative:       Surgery Specimen    Fungal Culture [[id number]] Collected:  12/11/19 0959    Order Status:  Completed Specimen:  Body Fluid Updated:  12/13/19 0908     Significant Indicator NEG     Source BF     Site Right thigh abscess     Culture Result Culture in progress.    Narrative:       Surgery Specimen    FLUID CULTURE W/GRAM STAIN [[id number]]  (Abnormal) Collected:  12/11/19 0959    Order Status:  Completed Specimen:  Body Fluid Updated:  12/13/19 0908     Significant Indicator POS     Source BF     Site Right thigh abscess     Culture Result -     Gram Stain Result Many WBCs.  Moderate Gram negative rods.       Culture Result Klebsiella pneumoniae  Heavy growth  See previous culture for sensitivity report.      Narrative:       Surgery Specimen    Anaerobic Culture [[id number]] Collected:  12/11/19 0959    Order Status:  Completed Specimen:  Body Fluid Updated:  12/13/19 0908     Significant Indicator NEG     Source BF     Site Right thigh abscess     Culture Result Culture in progress.    Narrative:       Surgery Specimen    Anaerobic Culture [[id number]] Collected:  12/11/19 1002    Order Status:  Completed Specimen:  Bone Updated:  12/13/19 0908     Significant Indicator NEG     Source BONE     Site right thigh bone      Culture Result Culture in progress.    Narrative:       Surgery Specimen    Fungal Culture [[id number]] Collected:  12/11/19 1002    Order Status:  Completed Specimen:  Bone Updated:  12/13/19 0908     Significant Indicator NEG     Source BONE     Site right thigh bone     Culture Result Culture in progress.    Narrative:       Surgery Specimen    AFB Culture [[id number]] Collected:  12/11/19 1002    Order Status:  Completed Specimen:  Bone Updated:  12/13/19 0908     Significant Indicator NEG     Source BONE     Site right thigh bone     Culture Result Culture in progress.     AFB Smear Results No acid fast bacilli seen.    Narrative:       Surgery Specimen    CULTURE TISSUE W/ GRM STAIN [[id number]]  (Abnormal) Collected:  12/11/19 1002    Order Status:  Completed Specimen:  Bone Updated:  12/13/19 0908     Significant Indicator POS     Source BONE     Site right thigh bone     Culture Result -     Gram Stain Result Rare WBCs.  No organisms seen.       Culture Result Klebsiella pneumoniae  Light growth  See previous culture for sensitivity report.      Narrative:       Surgery Specimen    Fungal Culture [[id number]] Collected:  12/11/19 1002    Order Status:  Completed Specimen:  Tissue Updated:  12/13/19 0908     Significant Indicator NEG     Source TISS     Site Right thigh periosteum     Culture Result Culture in progress.    Narrative:       Surgery Specimen    CULTURE TISSUE W/ GRM STAIN [[id number]]  (Abnormal)  (Susceptibility) Collected:  12/11/19 1002    Order Status:  Completed Specimen:  Tissue Updated:  12/13/19 0908     Significant Indicator POS     Source TISS     Site Right thigh periosteum     Culture Result -     Gram Stain Result Many WBCs.  Rare Gram negative rods.       Culture Result Klebsiella pneumoniae  Moderate growth      Narrative:       Surgery Specimen    Susceptibility     Klebsiella pneumoniae (1)     Antibiotic Interpretation Microscan Method Status    Ampicillin Resistant >16 mcg/mL TONY  "Final    Ceftriaxone Sensitive <=1 mcg/mL TONY Final    Ceftazidime Sensitive <=1 mcg/mL TONY Final    Cefotaxime Sensitive <=2 mcg/mL TONY Final    Cefazolin Sensitive <=2 mcg/mL TONY Final    Ciprofloxacin Sensitive <=1 mcg/mL TONY Final    Ampicillin/sulbactam Sensitive <=8/4 mcg/mL TONY Final    Cefepime Sensitive <=2 mcg/mL TONY Final    Tobramycin Sensitive <=4 mcg/mL TONY Final    Cefotetan Sensitive <=16 mcg/mL TONY Final    Ertapenem Sensitive <=0.5 mcg/mL TONY Final    Gentamicin Sensitive <=4 mcg/mL TONY Final    Pip/Tazobactam Sensitive <=16 mcg/mL TONY Final    Trimeth/Sulfa Sensitive <=2/38 mcg/mL TONY Final                   Anaerobic Culture [[id number]] Collected:  12/11/19 1002    Order Status:  Completed Specimen:  Tissue Updated:  12/13/19 0908     Significant Indicator NEG     Source TISS     Site Right thigh periosteum     Culture Result Culture in progress.    Narrative:       Surgery Specimen    AFB Culture [[id number]] Collected:  12/11/19 1002    Order Status:  Completed Specimen:  Tissue Updated:  12/13/19 0908     Significant Indicator NEG     Source TISS     Site Right thigh periosteum     Culture Result Culture in progress.     AFB Smear Results No acid fast bacilli seen.    Narrative:       Surgery Specimen    BLOOD CULTURE x2 [[id number]] Collected:  12/11/19 0121    Order Status:  Completed Specimen:  Blood from Peripheral Updated:  12/12/19 0923     Significant Indicator NEG     Source BLD     Site PERIPHERAL     Culture Result No Growth  Note: Blood cultures are incubated for 5 days and  are monitored continuously.Positive blood cultures  are called to the RN and reported as soon as  they are identified.      Narrative:       Per Hospital Policy: Only change Specimen Src: to \"Line\" if  specified by physician order.  No site indicated    BLOOD CULTURE x2 [[id number]] Collected:  12/11/19 0121    Order Status:  Completed Specimen:  Blood from Peripheral Updated:  12/12/19 0923     Significant " "Indicator NEG     Source BLD     Site PERIPHERAL     Culture Result No Growth  Note: Blood cultures are incubated for 5 days and  are monitored continuously.Positive blood cultures  are called to the RN and reported as soon as  they are identified.      Narrative:       Per Hospital Policy: Only change Specimen Src: to \"Line\" if  specified by physician order.  No site indicated    Acid Fast Stain [[id number]] Collected:  12/11/19 1002    Order Status:  Completed Specimen:  Tissue Updated:  12/11/19 1847     Significant Indicator NEG     Source TISS     Site Right thigh periosteum     AFB Smear Results No acid fast bacilli seen.    Narrative:       Surgery Specimen    GRAM STAIN [[id number]] Collected:  12/11/19 1002    Order Status:  Completed Specimen:  Bone Updated:  12/11/19 1847     Significant Indicator .     Source BONE     Site right thigh bone     Gram Stain Result Rare WBCs.  No organisms seen.      Narrative:       Surgery Specimen    GRAM STAIN [[id number]] Collected:  12/11/19 1002    Order Status:  Completed Specimen:  Tissue Updated:  12/11/19 1847     Significant Indicator .     Source TISS     Site Right thigh periosteum     Gram Stain Result Many WBCs.  Rare Gram negative rods.      Narrative:       Surgery Specimen    Acid Fast Stain [[id number]] Collected:  12/11/19 0959    Order Status:  Completed Specimen:  Body Fluid Updated:  12/11/19 1847     Significant Indicator NEG     Source BF     Site Right thigh abscess     AFB Smear Results No acid fast bacilli seen.    Narrative:       Surgery Specimen    Acid Fast Stain [[id number]] Collected:  12/11/19 1002    Order Status:  Completed Specimen:  Bone Updated:  12/11/19 1847     Significant Indicator NEG     Source BONE     Site right thigh bone     AFB Smear Results No acid fast bacilli seen.    Narrative:       Surgery Specimen    GRAM STAIN [[id number]] Collected:  12/11/19 0959    Order Status:  Completed Specimen:  Body Fluid Updated:  12/11/19 1847 "     Significant Indicator .     Source BF     Site Right thigh abscess     Gram Stain Result Many WBCs.  Moderate Gram negative rods.      Narrative:       Surgery Specimen    Acid Fast Stain [[id number]] Collected:  12/11/19 1002    Order Status:  Completed Specimen:  Tissue Updated:  12/11/19 1846     Significant Indicator NEG     Source TISS     Site right thigh tissue     AFB Smear Results No acid fast bacilli seen.    Narrative:       Surgery Specimen    GRAM STAIN [[id number]] Collected:  12/11/19 1002    Order Status:  Completed Specimen:  Tissue Updated:  12/11/19 1846     Significant Indicator .     Source TISS     Site right thigh tissue     Gram Stain Result Many WBCs.  No organisms seen.      Narrative:       Surgery Specimen          Assessment:  Active Hospital Problems    Diagnosis   • *Acute hematogenous osteomyelitis of right femur ([facility]) [M86.051]   • Dehydration [E86.0]   • Type 2 diabetes mellitus with hyperglycemia, with long-term current use of insulin ([facility]) [E11.65, Z79.4]   • Sepsis without acute organ dysfunction ([facility]) [A41.9]   Right thigh abscess    Plan:  Right femur osteomyelitis  Etiology likely acute hematogenous  Was recently treated in [place] in September for 4 weeks  Wound cultures are positive for Klebsiella    Right thigh abscess  Underwent I&D on 12/11/2019  Cultures are Klebsiella  Change Zosyn to Rocephin  PICC line  Aim for 6 weeks of antibiotics  Will likely need another surgery    Diabetes mellitus  Uncontrolled with hemoglobin A1c of 10.3  Control the blood sugars for wound healing    Discussed with internal medicine.

## 2019-12-14 NOTE — PROGRESS NOTES
"   Orthopaedic PA Progress Note    Interval changes:Wound Cx + Klebsiella, Abx changed to ceftriaxone. C/O drain-associated discomfort, HV output 65 mL/24 hr, left in, d/w pt.    ROS - Patient denies any new issues. No chest pain, dyspnea, or fever.  Pain well controlled.    /83   Pulse 62   Temp 36.7 °C (98 °F) (Temporal)   Resp 17   Ht 1.626 m (5' 4\")   Wt 61.9 kg (136 lb 7.4 oz)   SpO2 99%     Patient seen and examined  No acute distress  Breathing non labored  RRR  Surgical dressing is clean, dry, and intact. Patient clearly fires tibialis anterior, EHL, and gastrocnemius/soleus. Sensation is intact to light touch throughout superficial peroneal, deep peroneal, tibial, saphenous, and sural nerve distributions. Strong and palpable 2+ dorsalis pedis and posterior tibial pulses with capillary refill less than 2 seconds. No lower leg tenderness or discomfort.    Recent Labs     12/11/19  2347 12/13/19  0104   WBC 11.7* 9.2   RBC 4.16* 4.06*   HEMOGLOBIN 11.8* 11.4*   HEMATOCRIT 36.4* 36.7*   MCV 86.3 90.4   MCH 28.4 28.1   MCHC 32.9* 31.1*   RDW 40.3 42.9   PLATELETCT 426 471*   MPV 10.0 10.2       Active Hospital Problems    Diagnosis   • Dehydration [E86.0]   • Type 2 diabetes mellitus with hyperglycemia, with long-term current use of insulin ([facility]) [E11.65, Z79.4]   • Acute hematogenous osteomyelitis of right femur ([facility]) [M86.051]   • Sepsis without acute organ dysfunction ([facility]) [A41.9]       Assessment/Plan:  POD#3 S/P I+D Rt thigh bone and soft tissue  Wt bearing status - WBATPT/OT-initiated  Wound care:Dressing change tricia rrow  Drains - DC when <=30mL/24h  Muro-*no  Sutures/Staples out- 10-14 days post operatively  Antibiotics: Rocephin per ID  DVT Prophylaxis- TEDS/SCDs/Foot pumps.   Lovenox: Start per Med, Duration-until ambulatory > 150'  Future Procedures - OR next week for spacer insertion  Case Coordination for Discharge Planning - Disposition pending further surgery with Dr. Rubin      "

## 2019-12-14 NOTE — PROGRESS NOTES
Hospital Medicine Daily Progress Note    Date of Service  12/14/2019    Chief Complaint  39 y.o. female admitted 12/10/2019 with right leg pain.    Hospital Course    This is a 39-year-old female with a past medical history of diabetes type 2 and recent treatment for right thigh osteomyelitis in [facility] admitted with right leg pain.  The patient was found to have right femur osteomyelitis with abscess on CT scan.  Orthopedic surgery was consulted and the patient underwent I&D on 12/11/2019.  ID is following for antibiotics.      Interval Problem Update  12/12:  Cultures growing LFGR.  Afebrile.  BP soft.  Continue IV hydration.  Continues to complain of uncontrolled pain.  Medications adjusted.  Surgical site with dressing C/D/I and HV drain in place.  Blood sugars uncontrolled.  Insulin adjusted.   12/14:  Cx positive for Klebsiella.  Antibiotics adjusted.  Ambulating well.  Pain controlled this am.  VSS.    Consultants/Specialty  Ortho surgery  ID    Code Status  FULL    Disposition  TBD.    Review of Systems  Review of Systems   Constitutional: Positive for malaise/fatigue. Negative for fever.   HENT: Negative for congestion and sore throat.    Eyes: Negative for blurred vision.   Respiratory: Negative for shortness of breath.    Cardiovascular: Negative for chest pain and leg swelling.   Gastrointestinal: Negative for abdominal pain, diarrhea, nausea and vomiting.   Genitourinary: Negative for dysuria and urgency.   Musculoskeletal: Positive for joint pain and myalgias. Negative for back pain.   Skin: Negative for rash.   Neurological: Negative for dizziness, tingling, tremors, sensory change, focal weakness and headaches.        Physical Exam  Temp:  [36.3 °C (97.3 °F)-37.3 °C (99.1 °F)] 36.7 °C (98 °F)  Pulse:  [60-72] 62  Resp:  [16-18] 17  BP: (118-143)/(65-85) 130/83  SpO2:  [95 %-99 %] 99 %    Physical Exam  Vitals signs and nursing note reviewed.   Constitutional:       General: She is not in acute  distress.     Appearance: Normal appearance.   HENT:      Head: Normocephalic and atraumatic.      Nose: Nose normal.      Mouth/Throat:      Mouth: Mucous membranes are moist.      Pharynx: Oropharynx is clear.   Eyes:      General: No scleral icterus.     Conjunctiva/sclera: Conjunctivae normal.   Neck:      Musculoskeletal: Normal range of motion. No neck rigidity.   Cardiovascular:      Rate and Rhythm: Normal rate.      Pulses: Normal pulses.      Heart sounds: Normal heart sounds. No murmur. No friction rub.   Pulmonary:      Effort: Pulmonary effort is normal. No respiratory distress.      Breath sounds: Normal breath sounds. No wheezing or rales.   Abdominal:      General: Bowel sounds are normal. There is no distension.      Tenderness: There is no tenderness. There is no guarding.   Musculoskeletal:         General: Tenderness present. No swelling.      Right lower leg: No edema.      Left lower leg: No edema.      Comments: Right lower extremity mobility limited by pain.  S/P surgical I&D.    Skin:     General: Skin is warm and dry.      Capillary Refill: Capillary refill takes less than 2 seconds.      Coloration: Skin is not jaundiced or pale.   Neurological:      General: No focal deficit present.      Mental Status: She is alert and oriented to person, place, and time. Mental status is at baseline.      Cranial Nerves: No cranial nerve deficit.      Motor: No weakness.   Psychiatric:         Mood and Affect: Mood normal.         Behavior: Behavior normal.         Thought Content: Thought content normal.         Judgment: Judgment normal.         Fluids    Intake/Output Summary (Last 24 hours) at 12/14/2019 1239  Last data filed at 12/14/2019 0900  Gross per 24 hour   Intake 880 ml   Output 45 ml   Net 835 ml       Laboratory  Recent Labs     12/11/19  2347 12/13/19  0104   WBC 11.7* 9.2   RBC 4.16* 4.06*   HEMOGLOBIN 11.8* 11.4*   HEMATOCRIT 36.4* 36.7*   MCV 86.3 90.4   MCH 28.4 28.1   MCHC 32.9*  31.1*   RDW 40.3 42.9   PLATELETCT 426 471*   MPV 10.0 10.2     Recent Labs     12/11/19  2347 12/13/19  0104   SODIUM 134* 140   POTASSIUM 4.4 4.1   CHLORIDE 99 105   CO2 24 27   GLUCOSE 492* 161*   BUN 26* 14   CREATININE 1.13 0.99   CALCIUM 8.5 9.4                   Imaging  DX-CHEST-FOR LINE PLACEMENT Perform procedure in: Patient's Room   Final Result      Peripherally inserted catheter has been placed and the tip projects appropriately over the superior vena cava.      CT-EXTREMITY, LOWER WITH RIGHT   Final Result      1.  Cortical irregularity and breakthrough involving the distal femoral diaphysis posterolaterally with overlying periosteal reaction most likely representing osteomyelitis. This less likely represents a neoplastic process. There are linear lucent areas    extending into that region possibly representing prior biopsy versus tracks from prior fixation hardware.      2.  Multiloculated rim-enhancing fluid collections within the soft tissues of the thigh in the area of the above-described abnormality. The largest collection is located intramuscular within the vastus lateralis muscle and measures 7 x 5 x 4 cm in size.    Other components extend into the vastus intermedialis muscle. Some of these also contain gas and most likely represent abscesses.      IR-PICC LINE PLACEMENT W/ GUIDANCE > AGE 5    (Results Pending)        Assessment/Plan  * Acute hematogenous osteomyelitis of right femur (HCC)  Assessment & Plan  Recurrent.  Recent I&D and extended antibiotic regimen for Klebsiella OM in [place]  With associated multiple abscess formation.    S/P I&D on 12/11.  HV in place.  Cultures growing Klebsiella  Continue ceftriaxone.  Anticipate 6 weeks of IV antibiotics  Anticipate return to OR for antibiotic cement placement   ID following  Tight glycemic control  PT/OT    Dehydration  Assessment & Plan  Resolved with IVF.     Sepsis without acute organ dysfunction (HCC)  Assessment & Plan  This is  Sepsis Present on admission  SIRS criteria identified on my evaluation include: Tachycardia, with heart rate greater than 90 BPM and Leukocyosis, with WBC greater than 12,000  Source is Osteomyelitis  Sepsis protocol initiated  Fluid resuscitation ordered per protocol  IV antibiotics as appropriate for source of sepsis  While organ dysfunction may be noted elsewhere in this problem list or in the chart, degree of organ dysfunction does not meet CMS criteria for severe sepsis  Sepsis resolved.           Type 2 diabetes mellitus with hyperglycemia, with long-term current use of insulin ([facility])  Assessment & Plan  Uncontrolled.  Hgb A1c 10.3, down from 14.3 in 2018  Blood sugars improving.   Continue NPH 20 units BID  SSI  Diabetic diet  Accuchecks         VTE prophylaxis: SCDs.

## 2019-12-14 NOTE — PROGRESS NOTES
Infectious Disease Progress Note    Author: Carolina Kinney M.D. Date & Time of service: 2019  10:35 AM    Chief Complaint:    Right thigh pain  Interval History:  2019-no fevers.  WBC is 9.2.  Has pain in the thigh.  The cultures have come back positive for Klebsiella  2019- no fevers. No new issues. WBC 9.2 continues to complain of pain  Labs Reviewed and Medications Reviewed.    Review of Systems:  Review of Systems   Constitutional: Positive for malaise/fatigue. Negative for fever.   HENT: Negative for hearing loss.    Respiratory: Negative for cough and shortness of breath.    Cardiovascular: Negative for chest pain and leg swelling.   Gastrointestinal: Negative for nausea and vomiting.   Genitourinary: Negative for dysuria.   Musculoskeletal: Positive for myalgias.   Neurological: Negative for sensory change and speech change.       Hemodynamics:  Temp (24hrs), Av.7 °C (98 °F), Min:36.3 °C (97.3 °F), Max:37.3 °C (99.1 °F)  Temperature: 36.7 °C (98 °F)  Pulse  Av  Min: 58  Max: 119   Blood Pressure: 130/83       Physical Exam:  Physical Exam  Vitals signs reviewed.   Constitutional:       Appearance: Normal appearance. She is normal weight.   HENT:      Head: Normocephalic and atraumatic.      Right Ear: External ear normal.      Left Ear: External ear normal.      Nose: Nose normal. No congestion.      Mouth/Throat:      Mouth: Mucous membranes are moist.      Pharynx: No oropharyngeal exudate.   Eyes:      General: No scleral icterus.     Pupils: Pupils are equal, round, and reactive to light.   Neck:      Musculoskeletal: Neck supple.   Cardiovascular:      Rate and Rhythm: Normal rate and regular rhythm.      Heart sounds: Normal heart sounds. No murmur.   Pulmonary:      Effort: Pulmonary effort is normal. No respiratory distress.      Breath sounds: No wheezing.   Abdominal:      General: Bowel sounds are normal. There is no distension.      Palpations: Abdomen is soft.       Tenderness: There is no tenderness. There is no guarding.   Musculoskeletal:         General: Swelling and tenderness present. No deformity.      Comments:  Rt Leg bandaged plus Hemovac   Lymphadenopathy:      Cervical: No cervical adenopathy.   Skin:     General: Skin is warm and dry.      Findings: No erythema.   Neurological:      General: No focal deficit present.      Mental Status: She is alert and oriented to person, place, and time.   Psychiatric:         Mood and Affect: Mood normal.         Meds:    Current Facility-Administered Medications:   •  ketorolac  •  cefTRIAXone (ROCEPHIN) IV  •  Notify provider if pain remains uncontrolled **AND** Use the numeric rating scale (NRS-11) on regular floors and Critical-Care Pain Observation Tool (CPOT) on ICUs/Trauma to assess pain **AND** Pulse Ox (Oximetry) **AND** Pharmacy Consult Request **AND** If patient difficult to arouse and/or has respiratory depression, stop any opiates that are currently infusing and call a Rapid Response. **AND** [DISCONTINUED] oxyCODONE immediate-release **AND** [DISCONTINUED] oxyCODONE immediate-release **AND** morphine injection  •  cyclobenzaprine  •  oxyCODONE immediate-release **OR** oxyCODONE immediate-release  •  acetaminophen  •  cloNIDine  •  ondansetron  •  ondansetron  •  promethazine  •  promethazine  •  prochlorperazine  •  senna-docusate **AND** polyethylene glycol/lytes **AND** magnesium hydroxide **AND** bisacodyl  •  LR  •  [DISCONTINUED] insulin regular **AND** Accu-Chek Q6 if NPO **AND** NOTIFY MD and PharmD **AND** glucose **AND** dextrose 10% bolus  •  insulin lispro **AND** Accu-Chek ACHS **AND** NOTIFY MD and PharmD **AND** glucose **AND** dextrose 10% bolus  •  insulin NPH    Labs:  Recent Labs     12/11/19  2347 12/13/19  0104   WBC 11.7* 9.2   RBC 4.16* 4.06*   HEMOGLOBIN 11.8* 11.4*   HEMATOCRIT 36.4* 36.7*   MCV 86.3 90.4   MCH 28.4 28.1   RDW 40.3 42.9   PLATELETCT 426 471*   MPV 10.0 10.2   NEUTSPOLYS  86.10* 53.90   LYMPHOCYTES 7.50* 35.70   MONOCYTES 6.00 7.50   EOSINOPHILS 0.00 1.50   BASOPHILS 0.10 0.60     Recent Labs     12/11/19  2347 12/13/19  0104   SODIUM 134* 140   POTASSIUM 4.4 4.1   CHLORIDE 99 105   CO2 24 27   GLUCOSE 492* 161*   BUN 26* 14     Recent Labs     12/11/19  2347 12/13/19  0104   CREATININE 1.13 0.99       Imaging:  Ct-extremity, Lower With Right    Result Date: 12/11/2019 12/11/2019 12:15 AM HISTORY/REASON FOR EXAM:  Right thigh swelling. Prior osteomyelitis.. TECHNIQUE/EXAM DESCRIPTION AND NUMBER OF VIEWS:  CT scan of the RIGHT lower extremity with contrast, with reconstructions. Thin helical 3 mm sections were obtained from the distal femur through the proximal tibia/fibula. Sagittal and coronal multiplanar reconstructions were generated from the axial images. A total of 100 mL of Omnipaque 350 nonionic contrast was administered  IV without complication. Up to date radiation dose reduction adjustments have been utilized to meet ALARA standards for radiation dose reduction. COMPARISON: None. FINDINGS: There is periosteal reaction with cortical irregularity and cortical defect involving the distal femoral diaphysis posterolaterally. There are linear lucent areas extending through the cortex into the medullary cavity in those areas possibly representing  prior bone biopsy or tracks from prior fixation hardware. There is some mottled bone density involving the intramedullary area extending proximal and distal to that area. Within the overlying soft tissues in that area, there is a large multiloculated rim-enhancing fluid collection which extends into the vastus lateralis and vastus intermedius muscles. This largest component of this multiloculated fluid collection is located posterolaterally and measures 7 x 5 x 4 cm in size. There are other smaller components located anteriorly and posteriorly distal to the larger abscess. There are also small pockets of gas present within the distal  posterior fluid collection. No significant knee or hip joint effusion.     1.  Cortical irregularity and breakthrough involving the distal femoral diaphysis posterolaterally with overlying periosteal reaction most likely representing osteomyelitis. This less likely represents a neoplastic process. There are linear lucent areas extending into that region possibly representing prior biopsy versus tracks from prior fixation hardware. 2.  Multiloculated rim-enhancing fluid collections within the soft tissues of the thigh in the area of the above-described abnormality. The largest collection is located intramuscular within the vastus lateralis muscle and measures 7 x 5 x 4 cm in size. Other components extend into the vastus intermedialis muscle. Some of these also contain gas and most likely represent abscesses.      Micro:  Results     Procedure Component Value Units Date/Time    AFB Culture [[id number]] Collected:  12/11/19 1002    Order Status:  Completed Specimen:  Tissue Updated:  12/14/19 0933     Significant Indicator NEG     Source TISS     Site right thigh tissue     Culture Result Culture in progress.     AFB Smear Results No acid fast bacilli seen.    Narrative:       Surgery Specimen    Fungal Culture [[id number]] Collected:  12/11/19 1002    Order Status:  Completed Specimen:  Tissue Updated:  12/14/19 0933     Significant Indicator NEG     Source TISS     Site right thigh tissue     Culture Result Culture in progress.    Narrative:       Surgery Specimen    CULTURE TISSUE W/ GRM STAIN [[id number]]  (Abnormal) Collected:  12/11/19 1002    Order Status:  Completed Specimen:  Tissue Updated:  12/14/19 0933     Significant Indicator POS     Source TISS     Site right thigh tissue     Culture Result -     Gram Stain Result Many WBCs.  No organisms seen.       Culture Result Klebsiella pneumoniae  Moderate growth  See previous culture for sensitivity report.      Narrative:       Surgery Specimen    Anaerobic  Culture [[id number]] Collected:  12/11/19 1002    Order Status:  Completed Specimen:  Tissue Updated:  12/14/19 0933     Significant Indicator NEG     Source TISS     Site right thigh tissue     Culture Result No Anaerobes isolated.    Narrative:       Surgery Specimen    AFB Culture [[id number]] Collected:  12/11/19 0959    Order Status:  Completed Specimen:  Body Fluid Updated:  12/14/19 0933     Significant Indicator NEG     Source BF     Site Right thigh abscess     Culture Result Culture in progress.     AFB Smear Results No acid fast bacilli seen.    Narrative:       Surgery Specimen    Fungal Culture [[id number]] Collected:  12/11/19 0959    Order Status:  Completed Specimen:  Body Fluid Updated:  12/14/19 0933     Significant Indicator NEG     Source BF     Site Right thigh abscess     Culture Result Culture in progress.    Narrative:       Surgery Specimen    FLUID CULTURE W/GRAM STAIN [[id number]]  (Abnormal) Collected:  12/11/19 0959    Order Status:  Completed Specimen:  Body Fluid Updated:  12/14/19 0933     Significant Indicator POS     Source BF     Site Right thigh abscess     Culture Result -     Gram Stain Result Many WBCs.  Moderate Gram negative rods.       Culture Result Klebsiella pneumoniae  Heavy growth  See previous culture for sensitivity report.      Narrative:       Surgery Specimen    Anaerobic Culture [[id number]] Collected:  12/11/19 0959    Order Status:  Completed Specimen:  Body Fluid Updated:  12/14/19 0933     Significant Indicator NEG     Source BF     Site Right thigh abscess     Culture Result No Anaerobes isolated.    Narrative:       Surgery Specimen    CULTURE TISSUE W/ GRM STAIN [[id number]]  (Abnormal) Collected:  12/11/19 1002    Order Status:  Completed Specimen:  Bone Updated:  12/14/19 0932     Significant Indicator POS     Source BONE     Site right thigh bone     Culture Result -     Gram Stain Result Rare WBCs.  No organisms seen.       Culture Result Klebsiella  pneumoniae  Light growth  See previous culture for sensitivity report.      Narrative:       Surgery Specimen    Anaerobic Culture [[id number]] Collected:  12/11/19 1002    Order Status:  Completed Specimen:  Bone Updated:  12/14/19 0932     Significant Indicator NEG     Source BONE     Site right thigh bone     Culture Result No Anaerobes isolated.    Narrative:       Surgery Specimen    Fungal Culture [[id number]] Collected:  12/11/19 1002    Order Status:  Completed Specimen:  Bone Updated:  12/14/19 0932     Significant Indicator NEG     Source BONE     Site right thigh bone     Culture Result Culture in progress.    Narrative:       Surgery Specimen    AFB Culture [[id number]] Collected:  12/11/19 1002    Order Status:  Completed Specimen:  Bone Updated:  12/14/19 0932     Significant Indicator NEG     Source BONE     Site right thigh bone     Culture Result Culture in progress.     AFB Smear Results No acid fast bacilli seen.    Narrative:       Surgery Specimen    AFB Culture [[id number]] Collected:  12/11/19 1002    Order Status:  Completed Specimen:  Tissue Updated:  12/14/19 0931     Significant Indicator NEG     Source TISS     Site Right thigh periosteum     Culture Result Culture in progress.     AFB Smear Results No acid fast bacilli seen.    Narrative:       Surgery Specimen    Fungal Culture [[id number]] Collected:  12/11/19 1002    Order Status:  Completed Specimen:  Tissue Updated:  12/14/19 0931     Significant Indicator NEG     Source TISS     Site Right thigh periosteum     Culture Result Culture in progress.    Narrative:       Surgery Specimen    CULTURE TISSUE W/ GRM STAIN [[id number]]  (Abnormal)  (Susceptibility) Collected:  12/11/19 1002    Order Status:  Completed Specimen:  Tissue Updated:  12/14/19 0931     Significant Indicator POS     Source TISS     Site Right thigh periosteum     Culture Result -     Gram Stain Result Many WBCs.  Rare Gram negative rods.       Culture Result Klebsiella  "pneumoniae  Moderate growth      Narrative:       Surgery Specimen    Susceptibility     Klebsiella pneumoniae (1)     Antibiotic Interpretation Microscan Method Status    Ampicillin Resistant >16 mcg/mL TONY Final    Ceftriaxone Sensitive <=1 mcg/mL TONY Final    Ceftazidime Sensitive <=1 mcg/mL TONY Final    Cefotaxime Sensitive <=2 mcg/mL TONY Final    Cefazolin Sensitive <=2 mcg/mL TONY Final    Ciprofloxacin Sensitive <=1 mcg/mL TONY Final    Ampicillin/sulbactam Sensitive <=8/4 mcg/mL TONY Final    Cefepime Sensitive <=2 mcg/mL TONY Final    Tobramycin Sensitive <=4 mcg/mL TONY Final    Cefotetan Sensitive <=16 mcg/mL TONY Final    Ertapenem Sensitive <=0.5 mcg/mL TONY Final    Gentamicin Sensitive <=4 mcg/mL TONY Final    Pip/Tazobactam Sensitive <=16 mcg/mL TONY Final    Trimeth/Sulfa Sensitive <=2/38 mcg/mL TONY Final                   Anaerobic Culture [[id number]] Collected:  12/11/19 1002    Order Status:  Completed Specimen:  Tissue Updated:  12/14/19 0931     Significant Indicator NEG     Source TISS     Site Right thigh periosteum     Culture Result No Anaerobes isolated.    Narrative:       Surgery Specimen    BLOOD CULTURE x2 [[id number]] Collected:  12/11/19 0121    Order Status:  Completed Specimen:  Blood from Peripheral Updated:  12/12/19 0923     Significant Indicator NEG     Source BLD     Site PERIPHERAL     Culture Result No Growth  Note: Blood cultures are incubated for 5 days and  are monitored continuously.Positive blood cultures  are called to the RN and reported as soon as  they are identified.      Narrative:       Per Hospital Policy: Only change Specimen Src: to \"Line\" if  specified by physician order.  No site indicated    BLOOD CULTURE x2 [[id number]] Collected:  12/11/19 0121    Order Status:  Completed Specimen:  Blood from Peripheral Updated:  12/12/19 0923     Significant Indicator NEG     Source BLD     Site PERIPHERAL     Culture Result No Growth  Note: Blood cultures are incubated for 5 days " "and  are monitored continuously.Positive blood cultures  are called to the RN and reported as soon as  they are identified.      Narrative:       Per Hospital Policy: Only change Specimen Src: to \"Line\" if  specified by physician order.  No site indicated    Acid Fast Stain [[id number]] Collected:  12/11/19 1002    Order Status:  Completed Specimen:  Tissue Updated:  12/11/19 1847     Significant Indicator NEG     Source TISS     Site Right thigh periosteum     AFB Smear Results No acid fast bacilli seen.    Narrative:       Surgery Specimen    GRAM STAIN [[id number]] Collected:  12/11/19 1002    Order Status:  Completed Specimen:  Bone Updated:  12/11/19 1847     Significant Indicator .     Source BONE     Site right thigh bone     Gram Stain Result Rare WBCs.  No organisms seen.      Narrative:       Surgery Specimen    GRAM STAIN [[id number]] Collected:  12/11/19 1002    Order Status:  Completed Specimen:  Tissue Updated:  12/11/19 1847     Significant Indicator .     Source TISS     Site Right thigh periosteum     Gram Stain Result Many WBCs.  Rare Gram negative rods.      Narrative:       Surgery Specimen    Acid Fast Stain [[id number]] Collected:  12/11/19 0959    Order Status:  Completed Specimen:  Body Fluid Updated:  12/11/19 1847     Significant Indicator NEG     Source BF     Site Right thigh abscess     AFB Smear Results No acid fast bacilli seen.    Narrative:       Surgery Specimen    Acid Fast Stain [[id number]] Collected:  12/11/19 1002    Order Status:  Completed Specimen:  Bone Updated:  12/11/19 1847     Significant Indicator NEG     Source BONE     Site right thigh bone     AFB Smear Results No acid fast bacilli seen.    Narrative:       Surgery Specimen    GRAM STAIN [[id number]] Collected:  12/11/19 0959    Order Status:  Completed Specimen:  Body Fluid Updated:  12/11/19 1847     Significant Indicator .     Source BF     Site Right thigh abscess     Gram Stain Result Many WBCs.  Moderate Gram " negative rods.      Narrative:       Surgery Specimen    Acid Fast Stain [[id number]] Collected:  12/11/19 1002    Order Status:  Completed Specimen:  Tissue Updated:  12/11/19 1846     Significant Indicator NEG     Source TISS     Site right thigh tissue     AFB Smear Results No acid fast bacilli seen.    Narrative:       Surgery Specimen    GRAM STAIN [[id number]] Collected:  12/11/19 1002    Order Status:  Completed Specimen:  Tissue Updated:  12/11/19 1846     Significant Indicator .     Source TISS     Site right thigh tissue     Gram Stain Result Many WBCs.  No organisms seen.      Narrative:       Surgery Specimen          Assessment:  Active Hospital Problems    Diagnosis   • *Acute hematogenous osteomyelitis of right femur ([facility]) [M86.051]   • Dehydration [E86.0]   • Type 2 diabetes mellitus with hyperglycemia, with long-term current use of insulin ([facility]) [E11.65, Z79.4]   • Sepsis without acute organ dysfunction ([facility]) [A41.9]   Right thigh abscess    Plan:  Right femur osteomyelitis  Etiology likely acute hematogenous  Was recently treated in [place] in September for 4 weeks  Wound cultures are positive for Klebsiella    Right thigh abscess  Underwent I&D on 12/11/2019  Cultures are Klebsiella  Change Zosyn to Rocephin  PICC line  Aim for 6 weeks of antibiotics  Will likely need another surgery    Diabetes mellitus  Uncontrolled with hemoglobin A1c of 10.3  Control the blood sugars for wound healing    Discussed with internal medicine.

## 2019-12-15 LAB
GLUCOSE BLD-MCNC: 125 MG/DL (ref 65–99)
GLUCOSE BLD-MCNC: 136 MG/DL (ref 65–99)
GLUCOSE BLD-MCNC: 160 MG/DL (ref 65–99)
GLUCOSE BLD-MCNC: 165 MG/DL (ref 65–99)
GLUCOSE BLD-MCNC: 228 MG/DL (ref 65–99)

## 2019-12-15 PROCEDURE — 99232 SBSQ HOSP IP/OBS MODERATE 35: CPT | Performed by: INTERNAL MEDICINE

## 2019-12-15 PROCEDURE — A9270 NON-COVERED ITEM OR SERVICE: HCPCS | Performed by: INTERNAL MEDICINE

## 2019-12-15 PROCEDURE — 700105 HCHG RX REV CODE 258: Performed by: INTERNAL MEDICINE

## 2019-12-15 PROCEDURE — A9270 NON-COVERED ITEM OR SERVICE: HCPCS | Performed by: NURSE PRACTITIONER

## 2019-12-15 PROCEDURE — 700102 HCHG RX REV CODE 250 W/ 637 OVERRIDE(OP): Performed by: INTERNAL MEDICINE

## 2019-12-15 PROCEDURE — 700102 HCHG RX REV CODE 250 W/ 637 OVERRIDE(OP): Performed by: NURSE PRACTITIONER

## 2019-12-15 PROCEDURE — 700111 HCHG RX REV CODE 636 W/ 250 OVERRIDE (IP): Performed by: INTERNAL MEDICINE

## 2019-12-15 PROCEDURE — 82962 GLUCOSE BLOOD TEST: CPT

## 2019-12-15 PROCEDURE — 770006 HCHG ROOM/CARE - MED/SURG/GYN SEMI*

## 2019-12-15 PROCEDURE — 700105 HCHG RX REV CODE 258

## 2019-12-15 PROCEDURE — 700102 HCHG RX REV CODE 250 W/ 637 OVERRIDE(OP): Performed by: HOSPITALIST

## 2019-12-15 PROCEDURE — A9270 NON-COVERED ITEM OR SERVICE: HCPCS | Performed by: HOSPITALIST

## 2019-12-15 RX ORDER — SODIUM CHLORIDE 9 MG/ML
INJECTION, SOLUTION INTRAVENOUS
Status: COMPLETED
Start: 2019-12-15 | End: 2019-12-15

## 2019-12-15 RX ADMIN — ASPIRIN 81 MG: 81 TABLET, COATED ORAL at 12:10

## 2019-12-15 RX ADMIN — CEFTRIAXONE SODIUM 2 G: 2 INJECTION, POWDER, FOR SOLUTION INTRAMUSCULAR; INTRAVENOUS at 06:29

## 2019-12-15 RX ADMIN — INSULIN LISPRO 3 UNITS: 100 INJECTION, SOLUTION INTRAVENOUS; SUBCUTANEOUS at 12:49

## 2019-12-15 RX ADMIN — OXYCODONE HYDROCHLORIDE 10 MG: 10 TABLET ORAL at 06:30

## 2019-12-15 RX ADMIN — INSULIN LISPRO 4 UNITS: 100 INJECTION, SOLUTION INTRAVENOUS; SUBCUTANEOUS at 20:32

## 2019-12-15 RX ADMIN — OXYCODONE HYDROCHLORIDE 10 MG: 10 TABLET ORAL at 12:10

## 2019-12-15 RX ADMIN — SENNOSIDES AND DOCUSATE SODIUM 2 TABLET: 8.6; 5 TABLET ORAL at 06:30

## 2019-12-15 RX ADMIN — SODIUM CHLORIDE 500 ML: 9 INJECTION, SOLUTION INTRAVENOUS at 06:30

## 2019-12-15 RX ADMIN — INSULIN HUMAN 20 UNITS: 100 INJECTION, SUSPENSION SUBCUTANEOUS at 18:22

## 2019-12-15 RX ADMIN — INSULIN LISPRO 3 UNITS: 100 INJECTION, SOLUTION INTRAVENOUS; SUBCUTANEOUS at 18:21

## 2019-12-15 RX ADMIN — OXYCODONE HYDROCHLORIDE 10 MG: 10 TABLET ORAL at 00:28

## 2019-12-15 RX ADMIN — INSULIN HUMAN 20 UNITS: 100 INJECTION, SUSPENSION SUBCUTANEOUS at 06:39

## 2019-12-15 ASSESSMENT — COGNITIVE AND FUNCTIONAL STATUS - GENERAL
SUGGESTED CMS G CODE MODIFIER MOBILITY: CH
SUGGESTED CMS G CODE MODIFIER DAILY ACTIVITY: CH
MOBILITY SCORE: 24
DAILY ACTIVITIY SCORE: 24

## 2019-12-15 ASSESSMENT — ENCOUNTER SYMPTOMS
SENSORY CHANGE: 0
FOCAL WEAKNESS: 0
MYALGIAS: 1
TINGLING: 0
DIZZINESS: 0
PALPITATIONS: 0
DIARRHEA: 0
SPEECH CHANGE: 0
VOMITING: 0
TREMORS: 0
ABDOMINAL PAIN: 0
BLURRED VISION: 0
HEADACHES: 0
DOUBLE VISION: 0
CHILLS: 0
CONSTIPATION: 0
SHORTNESS OF BREATH: 0
NAUSEA: 0
COUGH: 0
FEVER: 0

## 2019-12-15 NOTE — CARE PLAN
Problem: Communication  Goal: The ability to communicate needs accurately and effectively will improve  Outcome: PROGRESSING AS EXPECTED  Note:   Patient refusing Lovenox injections this AM. Spoke with NP Edwin today and changed to ASA. Patient accepted this.      Problem: Pain Management  Goal: Pain level will decrease to patient's comfort goal  Outcome: PROGRESSING AS EXPECTED  Note:   Patient reports pain is better managed today. No IV Morphine given. Reports 5/10, oxy given q3h.

## 2019-12-15 NOTE — CARE PLAN
Problem: Infection  Goal: Will remain free from infection  Outcome: PROGRESSING AS EXPECTED  Note:   Controlling blood sugars. R Thigh incision is CDI. Dsg changed so patient could shower. PICC line placed this morning in RUE.      Problem: Pain Management  Goal: Pain level will decrease to patient's comfort goal  Outcome: PROGRESSING AS EXPECTED  Note:   Patient pain has been fairly well controlled today. She has needed Oxy 10 mg x 2, only needing IV Morphine 4 mg x 1 for breakthrough pain. Has been able to get out of bed and ambulate due to pain control.

## 2019-12-15 NOTE — CARE PLAN
Patient has had pain rated at 6 to 8/10.  Patient has received oral pain medication and has not needed IV medication for breakthrough pain.

## 2019-12-15 NOTE — PROGRESS NOTES
"   Orthopaedic PA Progress Note    Interval changes:Resting comfortably, drain output not logged by staff this AM, likely remove Monday    ROS - Patient denies any new issues. No chest pain, dyspnea, or fever.  Pain well controlled.    /91   Pulse 61   Temp 36 °C (96.8 °F) (Temporal)   Resp 15   Ht 1.626 m (5' 4\")   Wt 61.7 kg (136 lb)   SpO2 96%     Patient seen and examined  No acute distress  Breathing non labored  RRR  Surgical dressing is clean, dry, and intact. Patient clearly fires tibialis anterior, EHL, and gastrocnemius/soleus. Sensation is intact to light touch throughout superficial peroneal, deep peroneal, tibial, saphenous, and sural nerve distributions. Strong and palpable 2+ dorsalis pedis and posterior tibial pulses with capillary refill less than 2 seconds. No lower leg tenderness or discomfort.    Recent Labs     12/13/19  0104   WBC 9.2   RBC 4.06*   HEMOGLOBIN 11.4*   HEMATOCRIT 36.7*   MCV 90.4   MCH 28.1   MCHC 31.1*   RDW 42.9   PLATELETCT 471*   MPV 10.2       Active Hospital Problems    Diagnosis   • Acute hematogenous osteomyelitis of right femur ([facility]) [M86.051]     Priority: High   • Dehydration [E86.0]   • Type 2 diabetes mellitus with hyperglycemia, with long-term current use of insulin ([facility]) [E11.65, Z79.4]   • Sepsis without acute organ dysfunction ([facility]) [A41.9]     Assessment/Plan:  POD#4 S/P I+D Rt thigh bone and soft tissue  Wt bearing status - WBATPT/OT-initiated  Wound care:Dressing change tricia rrow  Drains - DC when <=30mL/24h  Muro-*no  Sutures/Staples out- 10-14 days post operatively  Antibiotics: Rocephin per ID  DVT Prophylaxis- TEDS/SCDs/Foot pumps.   Lovenox: Start per Med, Duration-until ambulatory > 150'  Future Procedures - OR next week for spacer insertion  Case Coordination for Discharge Planning - Disposition pending further surgery with Dr. Rubin      "

## 2019-12-15 NOTE — PROGRESS NOTES
[facility] Daily Progress Note    Date of Service  12/15/2019    Chief Complaint  39 y.o. female admitted 12/10/2019 with right leg pain.    Hospital Course    This is a 39-year-old female with a past medical history of diabetes type 2 and recent treatment for right thigh osteomyelitis in [place] admitted with right leg pain.  The patient was found to have right femur osteomyelitis with abscess on CT scan.  Orthopedic surgery was consulted and the patient underwent I&D on 12/11/2019.  ID is following for antibiotics.      Interval Problem Update  12/12:  Cultures growing LFGR.  Afebrile.  BP soft.  Continue IV hydration.  Continues to complain of uncontrolled pain.  Medications adjusted.  Surgical site with dressing C/D/I and HV drain in place.  Blood sugars uncontrolled.  Insulin adjusted.   12/14:  Cx positive for Klebsiella.  Antibiotics adjusted.  Ambulating well.  Pain controlled this am.  VSS.  12/15:  Pain well controlled.  Ambulatory.  Blood sugars much better controlled.      Consultants/Specialty  Ortho surgery  ID    Code Status  FULL    Disposition  TBD.    Review of Systems  Review of Systems   Constitutional: Negative for chills, fever and malaise/fatigue.   HENT: Negative for congestion.    Eyes: Negative for blurred vision and double vision.   Respiratory: Negative for cough and shortness of breath.    Cardiovascular: Negative for chest pain, palpitations and leg swelling.   Gastrointestinal: Negative for abdominal pain, constipation, diarrhea, nausea and vomiting.   Genitourinary: Negative for dysuria.   Musculoskeletal: Positive for joint pain and myalgias.   Skin: Negative for itching and rash.   Neurological: Negative for dizziness, tingling, tremors, sensory change, speech change, focal weakness and headaches.        Physical Exam  Temp:  [36 °C (96.8 °F)-36.6 °C (97.8 °F)] 36 °C (96.8 °F)  Pulse:  [61-68] 61  Resp:  [15-16] 15  BP: ()/(52-91) 130/91  SpO2:  [94 %-97 %] 96  %    Physical Exam  Vitals signs and nursing note reviewed.   Constitutional:       General: She is not in acute distress.     Appearance: Normal appearance. She is not ill-appearing.   HENT:      Head: Normocephalic and atraumatic.      Right Ear: External ear normal.      Left Ear: External ear normal.      Nose: Nose normal. No congestion.      Mouth/Throat:      Mouth: Mucous membranes are moist.      Pharynx: Oropharynx is clear. No oropharyngeal exudate.   Eyes:      General:         Right eye: No discharge.         Left eye: No discharge.      Conjunctiva/sclera: Conjunctivae normal.   Neck:      Musculoskeletal: Normal range of motion. No neck rigidity or muscular tenderness.   Cardiovascular:      Rate and Rhythm: Normal rate.      Pulses: Normal pulses.      Heart sounds: Normal heart sounds. No murmur.   Pulmonary:      Effort: Pulmonary effort is normal. No respiratory distress.      Breath sounds: Normal breath sounds. No wheezing.   Abdominal:      General: Bowel sounds are normal. There is no distension.      Tenderness: There is no tenderness.   Musculoskeletal:         General: Tenderness present.      Right lower leg: No edema.      Left lower leg: No edema.      Comments: Right lower extremity mobility limited by pain.  S/P surgical I&D.    Skin:     General: Skin is warm and dry.      Capillary Refill: Capillary refill takes less than 2 seconds.      Coloration: Skin is not jaundiced or pale.      Findings: No bruising.   Neurological:      General: No focal deficit present.      Mental Status: She is alert and oriented to person, place, and time. Mental status is at baseline.      Cranial Nerves: No cranial nerve deficit.   Psychiatric:         Mood and Affect: Mood normal.         Behavior: Behavior normal.         Thought Content: Thought content normal.         Judgment: Judgment normal.         Fluids    Intake/Output Summary (Last 24 hours) at 12/15/2019 1026  Last data filed at 12/14/2019  2100  Gross per 24 hour   Intake 540 ml   Output 10 ml   Net 530 ml       Laboratory  Recent Labs     12/13/19  0104   WBC 9.2   RBC 4.06*   HEMOGLOBIN 11.4*   HEMATOCRIT 36.7*   MCV 90.4   MCH 28.1   MCHC 31.1*   RDW 42.9   PLATELETCT 471*   MPV 10.2     Recent Labs     12/13/19  0104   SODIUM 140   POTASSIUM 4.1   CHLORIDE 105   CO2 27   GLUCOSE 161*   BUN 14   CREATININE 0.99   CALCIUM 9.4                   Imaging  IR-PICC LINE PLACEMENT W/ GUIDANCE > AGE 5   Final Result                  Ultrasound-guided PICC placement performed by qualified nursing staff as    above.          DX-CHEST-FOR LINE PLACEMENT Perform procedure in: Patient's Room   Final Result      Peripherally inserted catheter has been placed and the tip projects appropriately over the superior vena cava.      CT-EXTREMITY, LOWER WITH RIGHT   Final Result      1.  Cortical irregularity and breakthrough involving the distal femoral diaphysis posterolaterally with overlying periosteal reaction most likely representing osteomyelitis. This less likely represents a neoplastic process. There are linear lucent areas    extending into that region possibly representing prior biopsy versus tracks from prior fixation hardware.      2.  Multiloculated rim-enhancing fluid collections within the soft tissues of the thigh in the area of the above-described abnormality. The largest collection is located intramuscular within the vastus lateralis muscle and measures 7 x 5 x 4 cm in size.    Other components extend into the vastus intermedialis muscle. Some of these also contain gas and most likely represent abscesses.           Assessment/Plan  * Acute hematogenous osteomyelitis of right femur (HCC)  Assessment & Plan  Recent I&D and extended antibiotic regimen for Klebsiella OM in [place]  Recurrent infection now with associated multiple abscess formation.    S/P I&D on 12/11.  HV in place.  Cultures growing Klebsiella  Continue ceftriaxone.  Anticipate 6 weeks  of IV antibiotics  PICC line in place.  Anticipate return to OR for antibiotic cement placement next week.  ID following  Tight glycemic control  PT/OT    Dehydration  Assessment & Plan  Resolved with IVF.     Sepsis without acute organ dysfunction (HCC)  Assessment & Plan  This is Sepsis Present on admission  SIRS criteria identified on my evaluation include: Tachycardia, with heart rate greater than 90 BPM and Leukocyosis, with WBC greater than 12,000  Source is Osteomyelitis  Sepsis protocol initiated  Fluid resuscitation ordered per protocol  IV antibiotics as appropriate for source of sepsis  While organ dysfunction may be noted elsewhere in this problem list or in the chart, degree of organ dysfunction does not meet CMS criteria for severe sepsis  Sepsis resolved.           Type 2 diabetes mellitus with hyperglycemia, with long-term current use of insulin (HCC)  Assessment & Plan  Uncontrolled.  Hgb A1c 10.3, down from 14.3 in 2018  Blood sugars much improved.  Continue NPH 20 units BID  SSI  Diabetic diet  Accuchecks         VTE prophylaxis: SCDs/ASA.  Ambulatory

## 2019-12-15 NOTE — PROGRESS NOTES
Infectious Disease Progress Note    Author: Carolina Kinney M.D. Date & Time of service: 12/15/2019  8:08 AM    Chief Complaint:    Right thigh pain  Interval History:  2019-no fevers.  WBC is 9.2.  Has pain in the thigh.  The cultures have come back positive for Klebsiella  2019- no fevers. No new issues. WBC 9.2 continues to complain of pain  12/15/2019-no fevers.  No new issues overnight.  No labs available.  Wants to go home on oral antibiotics  Labs Reviewed and Medications Reviewed.      Review of Systems:  Review of Systems   Constitutional: Positive for malaise/fatigue. Negative for fever.   HENT: Negative for hearing loss.    Respiratory: Negative for cough and shortness of breath.    Cardiovascular: Negative for chest pain and leg swelling.   Gastrointestinal: Negative for nausea and vomiting.   Genitourinary: Negative for dysuria.   Musculoskeletal: Positive for myalgias.   Neurological: Negative for sensory change and speech change.       Hemodynamics:  Temp (24hrs), Av.4 °C (97.5 °F), Min:36.2 °C (97.1 °F), Max:36.6 °C (97.8 °F)  Temperature: 36.2 °C (97.1 °F)  Pulse  Av.6  Min: 58  Max: 119   Blood Pressure: 111/74       Physical Exam:  Physical Exam  Vitals signs reviewed.   Constitutional:       Appearance: Normal appearance. She is normal weight.   HENT:      Head: Normocephalic and atraumatic.      Right Ear: External ear normal.      Left Ear: External ear normal.      Nose: Nose normal. No congestion.      Mouth/Throat:      Mouth: Mucous membranes are moist.      Pharynx: No oropharyngeal exudate.   Eyes:      General: No scleral icterus.     Pupils: Pupils are equal, round, and reactive to light.   Neck:      Musculoskeletal: Neck supple.   Cardiovascular:      Rate and Rhythm: Normal rate and regular rhythm.      Heart sounds: Normal heart sounds. No murmur.   Pulmonary:      Effort: Pulmonary effort is normal. No respiratory distress.      Breath sounds: No wheezing.    Abdominal:      General: Bowel sounds are normal. There is no distension.      Palpations: Abdomen is soft.      Tenderness: There is no tenderness. There is no guarding.   Musculoskeletal:         General: Swelling and tenderness present. No deformity.      Comments:  Rt Leg bandaged plus Hemovac   Lymphadenopathy:      Cervical: No cervical adenopathy.   Skin:     General: Skin is warm and dry.      Findings: No erythema.   Neurological:      General: No focal deficit present.      Mental Status: She is alert and oriented to person, place, and time.   Psychiatric:         Mood and Affect: Mood normal.         Meds:    Current Facility-Administered Medications:   •  ketorolac  •  cefTRIAXone (ROCEPHIN) IV  •  Notify provider if pain remains uncontrolled **AND** Use the numeric rating scale (NRS-11) on regular floors and Critical-Care Pain Observation Tool (CPOT) on ICUs/Trauma to assess pain **AND** Pulse Ox (Oximetry) **AND** Pharmacy Consult Request **AND** If patient difficult to arouse and/or has respiratory depression, stop any opiates that are currently infusing and call a Rapid Response. **AND** [DISCONTINUED] oxyCODONE immediate-release **AND** [DISCONTINUED] oxyCODONE immediate-release **AND** morphine injection  •  cyclobenzaprine  •  oxyCODONE immediate-release **OR** oxyCODONE immediate-release  •  acetaminophen  •  cloNIDine  •  ondansetron  •  ondansetron  •  promethazine  •  promethazine  •  prochlorperazine  •  senna-docusate **AND** polyethylene glycol/lytes **AND** magnesium hydroxide **AND** bisacodyl  •  LR  •  [DISCONTINUED] insulin regular **AND** Accu-Chek Q6 if NPO **AND** NOTIFY MD and PharmD **AND** glucose **AND** dextrose 10% bolus  •  insulin lispro **AND** Accu-Chek ACHS **AND** NOTIFY MD and PharmD **AND** glucose **AND** dextrose 10% bolus  •  insulin NPH    Labs:  Recent Labs     12/13/19  0104   WBC 9.2   RBC 4.06*   HEMOGLOBIN 11.4*   HEMATOCRIT 36.7*   MCV 90.4   MCH 28.1   RDW  42.9   PLATELETCT 471*   MPV 10.2   NEUTSPOLYS 53.90   LYMPHOCYTES 35.70   MONOCYTES 7.50   EOSINOPHILS 1.50   BASOPHILS 0.60     Recent Labs     12/13/19  0104   SODIUM 140   POTASSIUM 4.1   CHLORIDE 105   CO2 27   GLUCOSE 161*   BUN 14     Recent Labs     12/13/19  0104   CREATININE 0.99       Imaging:  Ct-extremity, Lower With Right    Result Date: 12/11/2019 12/11/2019 12:15 AM HISTORY/REASON FOR EXAM:  Right thigh swelling. Prior osteomyelitis.. TECHNIQUE/EXAM DESCRIPTION AND NUMBER OF VIEWS:  CT scan of the RIGHT lower extremity with contrast, with reconstructions. Thin helical 3 mm sections were obtained from the distal femur through the proximal tibia/fibula. Sagittal and coronal multiplanar reconstructions were generated from the axial images. A total of 100 mL of Omnipaque 350 nonionic contrast was administered  IV without complication. Up to date radiation dose reduction adjustments have been utilized to meet ALARA standards for radiation dose reduction. COMPARISON: None. FINDINGS: There is periosteal reaction with cortical irregularity and cortical defect involving the distal femoral diaphysis posterolaterally. There are linear lucent areas extending through the cortex into the medullary cavity in those areas possibly representing  prior bone biopsy or tracks from prior fixation hardware. There is some mottled bone density involving the intramedullary area extending proximal and distal to that area. Within the overlying soft tissues in that area, there is a large multiloculated rim-enhancing fluid collection which extends into the vastus lateralis and vastus intermedius muscles. This largest component of this multiloculated fluid collection is located posterolaterally and measures 7 x 5 x 4 cm in size. There are other smaller components located anteriorly and posteriorly distal to the larger abscess. There are also small pockets of gas present within the distal posterior fluid collection. No significant  knee or hip joint effusion.     1.  Cortical irregularity and breakthrough involving the distal femoral diaphysis posterolaterally with overlying periosteal reaction most likely representing osteomyelitis. This less likely represents a neoplastic process. There are linear lucent areas extending into that region possibly representing prior biopsy versus tracks from prior fixation hardware. 2.  Multiloculated rim-enhancing fluid collections within the soft tissues of the thigh in the area of the above-described abnormality. The largest collection is located intramuscular within the vastus lateralis muscle and measures 7 x 5 x 4 cm in size. Other components extend into the vastus intermedialis muscle. Some of these also contain gas and most likely represent abscesses.      Micro:  Results     Procedure Component Value Units Date/Time    AFB Culture [[id number]] Collected:  12/11/19 1002    Order Status:  Completed Specimen:  Tissue Updated:  12/14/19 0933     Significant Indicator NEG     Source TISS     Site right thigh tissue     Culture Result Culture in progress.     AFB Smear Results No acid fast bacilli seen.    Narrative:       Surgery Specimen    Fungal Culture [[id number]] Collected:  12/11/19 1002    Order Status:  Completed Specimen:  Tissue Updated:  12/14/19 0933     Significant Indicator NEG     Source TISS     Site right thigh tissue     Culture Result Culture in progress.    Narrative:       Surgery Specimen    CULTURE TISSUE W/ GRM STAIN [[id number]]  (Abnormal) Collected:  12/11/19 1002    Order Status:  Completed Specimen:  Tissue Updated:  12/14/19 0933     Significant Indicator POS     Source TISS     Site right thigh tissue     Culture Result -     Gram Stain Result Many WBCs.  No organisms seen.       Culture Result Klebsiella pneumoniae  Moderate growth  See previous culture for sensitivity report.      Narrative:       Surgery Specimen    Anaerobic Culture [[id number]] Collected:  12/11/19 1002     Order Status:  Completed Specimen:  Tissue Updated:  12/14/19 0933     Significant Indicator NEG     Source TISS     Site right thigh tissue     Culture Result No Anaerobes isolated.    Narrative:       Surgery Specimen    AFB Culture [[id number]] Collected:  12/11/19 0959    Order Status:  Completed Specimen:  Body Fluid Updated:  12/14/19 0933     Significant Indicator NEG     Source BF     Site Right thigh abscess     Culture Result Culture in progress.     AFB Smear Results No acid fast bacilli seen.    Narrative:       Surgery Specimen    Fungal Culture [[id number]] Collected:  12/11/19 0959    Order Status:  Completed Specimen:  Body Fluid Updated:  12/14/19 0933     Significant Indicator NEG     Source BF     Site Right thigh abscess     Culture Result Culture in progress.    Narrative:       Surgery Specimen    FLUID CULTURE W/GRAM STAIN [[id number]]  (Abnormal) Collected:  12/11/19 0959    Order Status:  Completed Specimen:  Body Fluid Updated:  12/14/19 0933     Significant Indicator POS     Source BF     Site Right thigh abscess     Culture Result -     Gram Stain Result Many WBCs.  Moderate Gram negative rods.       Culture Result Klebsiella pneumoniae  Heavy growth  See previous culture for sensitivity report.      Narrative:       Surgery Specimen    Anaerobic Culture [[id number]] Collected:  12/11/19 0959    Order Status:  Completed Specimen:  Body Fluid Updated:  12/14/19 0933     Significant Indicator NEG     Source BF     Site Right thigh abscess     Culture Result No Anaerobes isolated.    Narrative:       Surgery Specimen    CULTURE TISSUE W/ GRM STAIN [[id number]]  (Abnormal) Collected:  12/11/19 1002    Order Status:  Completed Specimen:  Bone Updated:  12/14/19 0932     Significant Indicator POS     Source BONE     Site right thigh bone     Culture Result -     Gram Stain Result Rare WBCs.  No organisms seen.       Culture Result Klebsiella pneumoniae  Light growth  See previous culture for  sensitivity report.      Narrative:       Surgery Specimen    Anaerobic Culture [[id number]] Collected:  12/11/19 1002    Order Status:  Completed Specimen:  Bone Updated:  12/14/19 0932     Significant Indicator NEG     Source BONE     Site right thigh bone     Culture Result No Anaerobes isolated.    Narrative:       Surgery Specimen    Fungal Culture [[id number]] Collected:  12/11/19 1002    Order Status:  Completed Specimen:  Bone Updated:  12/14/19 0932     Significant Indicator NEG     Source BONE     Site right thigh bone     Culture Result Culture in progress.    Narrative:       Surgery Specimen    AFB Culture [[id number]] Collected:  12/11/19 1002    Order Status:  Completed Specimen:  Bone Updated:  12/14/19 0932     Significant Indicator NEG     Source BONE     Site right thigh bone     Culture Result Culture in progress.     AFB Smear Results No acid fast bacilli seen.    Narrative:       Surgery Specimen    AFB Culture [[id number]] Collected:  12/11/19 1002    Order Status:  Completed Specimen:  Tissue Updated:  12/14/19 0931     Significant Indicator NEG     Source TISS     Site Right thigh periosteum     Culture Result Culture in progress.     AFB Smear Results No acid fast bacilli seen.    Narrative:       Surgery Specimen    Fungal Culture [[id number]] Collected:  12/11/19 1002    Order Status:  Completed Specimen:  Tissue Updated:  12/14/19 0931     Significant Indicator NEG     Source TISS     Site Right thigh periosteum     Culture Result Culture in progress.    Narrative:       Surgery Specimen    CULTURE TISSUE W/ GRM STAIN [[id number]]  (Abnormal)  (Susceptibility) Collected:  12/11/19 1002    Order Status:  Completed Specimen:  Tissue Updated:  12/14/19 0931     Significant Indicator POS     Source TISS     Site Right thigh periosteum     Culture Result -     Gram Stain Result Many WBCs.  Rare Gram negative rods.       Culture Result Klebsiella pneumoniae  Moderate growth      Narrative:        "Surgery Specimen    Susceptibility     Klebsiella pneumoniae (1)     Antibiotic Interpretation Microscan Method Status    Ampicillin Resistant >16 mcg/mL TONY Final    Ceftriaxone Sensitive <=1 mcg/mL TONY Final    Ceftazidime Sensitive <=1 mcg/mL TONY Final    Cefotaxime Sensitive <=2 mcg/mL TONY Final    Cefazolin Sensitive <=2 mcg/mL TONY Final    Ciprofloxacin Sensitive <=1 mcg/mL TONY Final    Ampicillin/sulbactam Sensitive <=8/4 mcg/mL TONY Final    Cefepime Sensitive <=2 mcg/mL TONY Final    Tobramycin Sensitive <=4 mcg/mL TONY Final    Cefotetan Sensitive <=16 mcg/mL TONY Final    Ertapenem Sensitive <=0.5 mcg/mL TONY Final    Gentamicin Sensitive <=4 mcg/mL TONY Final    Pip/Tazobactam Sensitive <=16 mcg/mL TONY Final    Trimeth/Sulfa Sensitive <=2/38 mcg/mL TONY Final                   Anaerobic Culture [[id number]] Collected:  12/11/19 1002    Order Status:  Completed Specimen:  Tissue Updated:  12/14/19 0931     Significant Indicator NEG     Source TISS     Site Right thigh periosteum     Culture Result No Anaerobes isolated.    Narrative:       Surgery Specimen    BLOOD CULTURE x2 [[id number]] Collected:  12/11/19 0121    Order Status:  Completed Specimen:  Blood from Peripheral Updated:  12/12/19 0923     Significant Indicator NEG     Source BLD     Site PERIPHERAL     Culture Result No Growth  Note: Blood cultures are incubated for 5 days and  are monitored continuously.Positive blood cultures  are called to the RN and reported as soon as  they are identified.      Narrative:       Per Hospital Policy: Only change Specimen Src: to \"Line\" if  specified by physician order.  No site indicated    BLOOD CULTURE x2 [[id number]] Collected:  12/11/19 0121    Order Status:  Completed Specimen:  Blood from Peripheral Updated:  12/12/19 0923     Significant Indicator NEG     Source BLD     Site PERIPHERAL     Culture Result No Growth  Note: Blood cultures are incubated for 5 days and  are monitored continuously.Positive blood " "cultures  are called to the RN and reported as soon as  they are identified.      Narrative:       Per Hospital Policy: Only change Specimen Src: to \"Line\" if  specified by physician order.  No site indicated    Acid Fast Stain [[id number]] Collected:  12/11/19 1002    Order Status:  Completed Specimen:  Tissue Updated:  12/11/19 1847     Significant Indicator NEG     Source TISS     Site Right thigh periosteum     AFB Smear Results No acid fast bacilli seen.    Narrative:       Surgery Specimen    GRAM STAIN [[id number]] Collected:  12/11/19 1002    Order Status:  Completed Specimen:  Bone Updated:  12/11/19 1847     Significant Indicator .     Source BONE     Site right thigh bone     Gram Stain Result Rare WBCs.  No organisms seen.      Narrative:       Surgery Specimen    GRAM STAIN [[id number]] Collected:  12/11/19 1002    Order Status:  Completed Specimen:  Tissue Updated:  12/11/19 1847     Significant Indicator .     Source TISS     Site Right thigh periosteum     Gram Stain Result Many WBCs.  Rare Gram negative rods.      Narrative:       Surgery Specimen    Acid Fast Stain [[id number]] Collected:  12/11/19 0959    Order Status:  Completed Specimen:  Body Fluid Updated:  12/11/19 1847     Significant Indicator NEG     Source BF     Site Right thigh abscess     AFB Smear Results No acid fast bacilli seen.    Narrative:       Surgery Specimen    Acid Fast Stain [[id number]] Collected:  12/11/19 1002    Order Status:  Completed Specimen:  Bone Updated:  12/11/19 1847     Significant Indicator NEG     Source BONE     Site right thigh bone     AFB Smear Results No acid fast bacilli seen.    Narrative:       Surgery Specimen    GRAM STAIN [[id number]] Collected:  12/11/19 0959    Order Status:  Completed Specimen:  Body Fluid Updated:  12/11/19 1847     Significant Indicator .     Source BF     Site Right thigh abscess     Gram Stain Result Many WBCs.  Moderate Gram negative rods.      Narrative:       Surgery Specimen "    Acid Fast Stain [[id number]] Collected:  12/11/19 1002    Order Status:  Completed Specimen:  Tissue Updated:  12/11/19 1846     Significant Indicator NEG     Source TISS     Site right thigh tissue     AFB Smear Results No acid fast bacilli seen.    Narrative:       Surgery Specimen    GRAM STAIN [[id number]] Collected:  12/11/19 1002    Order Status:  Completed Specimen:  Tissue Updated:  12/11/19 1846     Significant Indicator .     Source TISS     Site right thigh tissue     Gram Stain Result Many WBCs.  No organisms seen.      Narrative:       Surgery Specimen          Assessment:  Active Hospital Problems    Diagnosis   • *Acute hematogenous osteomyelitis of right femur ([facility]) [M86.051]   • Dehydration [E86.0]   • Type 2 diabetes mellitus with hyperglycemia, with long-term current use of insulin ([facility]) [E11.65, Z79.4]   • Sepsis without acute organ dysfunction ([facility]) [A41.9]   Right thigh abscess    Plan:  Right femur osteomyelitis  Etiology likely acute hematogenous  Was recently treated in [place] in September for 4 weeks  Wound cultures are positive for Klebsiella  Sed rate is 76 and CRP is 22.58  Continue to monitor the labs    Right thigh abscess  Underwent I&D on 12/11/2019  Cultures are Klebsiella  Change Zosyn to Rocephin  PICC line  Aim for 6 weeks of antibiotics  For surgery next week for spacer insertion    Diabetes mellitus  Uncontrolled with hemoglobin A1c of 10.3  Control the blood sugars for wound healing    Discussed with internal medicine.

## 2019-12-16 LAB
BACTERIA BLD CULT: NORMAL
BACTERIA BLD CULT: NORMAL
GLUCOSE BLD-MCNC: 127 MG/DL (ref 65–99)
GLUCOSE BLD-MCNC: 146 MG/DL (ref 65–99)
GLUCOSE BLD-MCNC: 165 MG/DL (ref 65–99)
GLUCOSE BLD-MCNC: 238 MG/DL (ref 65–99)
SIGNIFICANT IND 70042: NORMAL
SIGNIFICANT IND 70042: NORMAL
SITE SITE: NORMAL
SITE SITE: NORMAL
SOURCE SOURCE: NORMAL
SOURCE SOURCE: NORMAL

## 2019-12-16 PROCEDURE — 99232 SBSQ HOSP IP/OBS MODERATE 35: CPT | Performed by: INTERNAL MEDICINE

## 2019-12-16 PROCEDURE — 82962 GLUCOSE BLOOD TEST: CPT | Mod: 91

## 2019-12-16 PROCEDURE — 85025 COMPLETE CBC W/AUTO DIFF WBC: CPT

## 2019-12-16 PROCEDURE — A9270 NON-COVERED ITEM OR SERVICE: HCPCS | Performed by: NURSE PRACTITIONER

## 2019-12-16 PROCEDURE — 700111 HCHG RX REV CODE 636 W/ 250 OVERRIDE (IP): Performed by: INTERNAL MEDICINE

## 2019-12-16 PROCEDURE — 770006 HCHG ROOM/CARE - MED/SURG/GYN SEMI*

## 2019-12-16 PROCEDURE — 80048 BASIC METABOLIC PNL TOTAL CA: CPT

## 2019-12-16 PROCEDURE — 700102 HCHG RX REV CODE 250 W/ 637 OVERRIDE(OP): Performed by: NURSE PRACTITIONER

## 2019-12-16 PROCEDURE — 700105 HCHG RX REV CODE 258: Performed by: INTERNAL MEDICINE

## 2019-12-16 RX ORDER — SODIUM CHLORIDE 9 MG/ML
INJECTION, SOLUTION INTRAVENOUS CONTINUOUS
Status: DISCONTINUED | OUTPATIENT
Start: 2019-12-17 | End: 2019-12-18 | Stop reason: HOSPADM

## 2019-12-16 RX ORDER — ACETAMINOPHEN 500 MG
1000 TABLET ORAL EVERY 6 HOURS PRN
Status: DISCONTINUED | OUTPATIENT
Start: 2019-12-16 | End: 2019-12-18 | Stop reason: HOSPADM

## 2019-12-16 RX ADMIN — INSULIN HUMAN 20 UNITS: 100 INJECTION, SUSPENSION SUBCUTANEOUS at 17:57

## 2019-12-16 RX ADMIN — INSULIN LISPRO 4 UNITS: 100 INJECTION, SOLUTION INTRAVENOUS; SUBCUTANEOUS at 20:20

## 2019-12-16 RX ADMIN — INSULIN LISPRO 3 UNITS: 100 INJECTION, SOLUTION INTRAVENOUS; SUBCUTANEOUS at 17:56

## 2019-12-16 RX ADMIN — CEFTRIAXONE SODIUM 2 G: 2 INJECTION, POWDER, FOR SOLUTION INTRAMUSCULAR; INTRAVENOUS at 04:45

## 2019-12-16 RX ADMIN — INSULIN HUMAN 20 UNITS: 100 INJECTION, SUSPENSION SUBCUTANEOUS at 07:00

## 2019-12-16 RX ADMIN — ASPIRIN 81 MG: 81 TABLET, COATED ORAL at 04:45

## 2019-12-16 ASSESSMENT — ENCOUNTER SYMPTOMS
DIZZINESS: 0
TINGLING: 0
SENSORY CHANGE: 0
COUGH: 0
ABDOMINAL PAIN: 0
FOCAL WEAKNESS: 0
TREMORS: 0
BLURRED VISION: 0
SHORTNESS OF BREATH: 0
MYALGIAS: 1
FEVER: 0
VOMITING: 0
SPEECH CHANGE: 0
BACK PAIN: 0
HEADACHES: 0
DIARRHEA: 0
NAUSEA: 0
SORE THROAT: 0

## 2019-12-16 NOTE — CARE PLAN
Problem: Safety  Goal: Will remain free from injury  Outcome: PROGRESSING AS EXPECTED     Problem: Infection  Goal: Will remain free from infection  Outcome: PROGRESSING AS EXPECTED     Problem: Venous Thromboembolism (VTW)/Deep Vein Thrombosis (DVT) Prevention:  Goal: Patient will participate in Venous Thrombosis (VTE)/Deep Vein Thrombosis (DVT)Prevention Measures  Outcome: PROGRESSING AS EXPECTED  Note:   Pt tolerates ambulating      Problem: Bowel/Gastric:  Goal: Normal bowel function is maintained or improved  Outcome: PROGRESSING AS EXPECTED     Problem: Pain Management  Goal: Pain level will decrease to patient's comfort goal  Outcome: PROGRESSING AS EXPECTED

## 2019-12-16 NOTE — CARE PLAN
Problem: Communication  Goal: The ability to communicate needs accurately and effectively will improve  Outcome: PROGRESSING AS EXPECTED  Note:   Patient curious about possible procedure. RN to communicate with patient if scheduled for surgery. Pending.      Problem: Pain Management  Goal: Pain level will decrease to patient's comfort goal  Outcome: PROGRESSING AS EXPECTED  Note:   Patient has no pain this AM during assessment. WBAT, ambulated in the room frequently.

## 2019-12-16 NOTE — PROGRESS NOTES
Infectious Disease Progress Note    Author: Rosalva Lee M.D. Date & Time of service: 2019  9:51 AM    Chief Complaint:  Follow-up for right thigh abscess and right femur osteomyelitis    Interval History:  2019-no fevers.  WBC is 9.2.  Has pain in the thigh.  The cultures have come back positive for Klebsiella  2019- no fevers. No new issues. WBC 9.2 continues to complain of pain  12/15/2019-no fevers.  No new issues overnight.  No labs available.  Wants to go home on oral antibiotics   Afebrile, No CBC done today.  Pain is controlled.  Patient states that she will undergo surgery tomorrow.  Tolerating antibiotics without any issues.    Labs Reviewed and Medications Reviewed.      Review of Systems:  Review of Systems   Constitutional: Positive for malaise/fatigue. Negative for fever.   HENT: Negative for hearing loss.    Respiratory: Negative for cough and shortness of breath.    Cardiovascular: Negative for chest pain and leg swelling.   Gastrointestinal: Negative for nausea and vomiting.   Genitourinary: Negative for dysuria.   Musculoskeletal: Positive for myalgias.   Neurological: Negative for dizziness, sensory change, speech change and headaches.   All other systems reviewed and are negative.      Hemodynamics:  Temp (24hrs), Av.4 °C (97.6 °F), Min:36.2 °C (97.1 °F), Max:36.8 °C (98.2 °F)  Temperature: 36.6 °C (97.9 °F)  Pulse  Av.3  Min: 58  Max: 119   Blood Pressure: 127/81       Physical Exam:  Physical Exam  Vitals signs reviewed.   Constitutional:       Appearance: Normal appearance. She is normal weight.   HENT:      Head: Normocephalic and atraumatic.      Right Ear: External ear normal.      Left Ear: External ear normal.      Nose: Nose normal. No congestion.      Mouth/Throat:      Mouth: Mucous membranes are moist.      Pharynx: No oropharyngeal exudate.   Eyes:      General: No scleral icterus.     Extraocular Movements: Extraocular movements intact.       Pupils: Pupils are equal, round, and reactive to light.   Neck:      Musculoskeletal: Neck supple.   Cardiovascular:      Rate and Rhythm: Normal rate and regular rhythm.      Heart sounds: Normal heart sounds. No murmur.   Pulmonary:      Effort: Pulmonary effort is normal. No respiratory distress.      Breath sounds: No wheezing.   Abdominal:      General: Bowel sounds are normal. There is no distension.      Palpations: Abdomen is soft.      Tenderness: There is no tenderness. There is no guarding.   Musculoskeletal:         General: Swelling and tenderness present. No deformity.      Comments:  Right lower extremity in bandage with Hemovac in place    PICC line   Lymphadenopathy:      Cervical: No cervical adenopathy.   Skin:     General: Skin is warm and dry.      Findings: No erythema.   Neurological:      General: No focal deficit present.      Mental Status: She is alert and oriented to person, place, and time.      Cranial Nerves: No cranial nerve deficit.   Psychiatric:         Mood and Affect: Mood normal.         Behavior: Behavior normal.         Meds:    Current Facility-Administered Medications:   •  aspirin EC  •  ketorolac  •  cefTRIAXone (ROCEPHIN) IV  •  Notify provider if pain remains uncontrolled **AND** Use the numeric rating scale (NRS-11) on regular floors and Critical-Care Pain Observation Tool (CPOT) on ICUs/Trauma to assess pain **AND** Pulse Ox (Oximetry) **AND** Pharmacy Consult Request **AND** If patient difficult to arouse and/or has respiratory depression, stop any opiates that are currently infusing and call a Rapid Response. **AND** [DISCONTINUED] oxyCODONE immediate-release **AND** [DISCONTINUED] oxyCODONE immediate-release **AND** morphine injection  •  cyclobenzaprine  •  oxyCODONE immediate-release **OR** oxyCODONE immediate-release  •  acetaminophen  •  cloNIDine  •  ondansetron  •  ondansetron  •  promethazine  •  promethazine  •  prochlorperazine  •  senna-docusate **AND**  polyethylene glycol/lytes **AND** magnesium hydroxide **AND** bisacodyl  •  LR  •  [DISCONTINUED] insulin regular **AND** Accu-Chek Q6 if NPO **AND** NOTIFY MD and PharmD **AND** glucose **AND** dextrose 10% bolus  •  insulin lispro **AND** Accu-Chek ACHS **AND** NOTIFY MD and PharmD **AND** glucose **AND** dextrose 10% bolus  •  insulin NPH    Labs:  No results for input(s): WBC, RBC, HEMOGLOBIN, HEMATOCRIT, MCV, MCH, RDW, PLATELETCT, MPV, NEUTSPOLYS, LYMPHOCYTES, MONOCYTES, EOSINOPHILS, BASOPHILS, RBCMORPHOLO in the last 72 hours.  No results for input(s): SODIUM, POTASSIUM, CHLORIDE, CO2, GLUCOSE, BUN, CPKTOTAL in the last 72 hours.  No results for input(s): ALBUMIN, TBILIRUBIN, ALKPHOSPHAT, TOTPROTEIN, ALTSGPT, ASTSGOT, CREATININE in the last 72 hours.    Imaging:  Ct-extremity, Lower With Right    Result Date: 12/11/2019 12/11/2019 12:15 AM HISTORY/REASON FOR EXAM:  Right thigh swelling. Prior osteomyelitis.. TECHNIQUE/EXAM DESCRIPTION AND NUMBER OF VIEWS:  CT scan of the RIGHT lower extremity with contrast, with reconstructions. Thin helical 3 mm sections were obtained from the distal femur through the proximal tibia/fibula. Sagittal and coronal multiplanar reconstructions were generated from the axial images. A total of 100 mL of Omnipaque 350 nonionic contrast was administered  IV without complication. Up to date radiation dose reduction adjustments have been utilized to meet ALARA standards for radiation dose reduction. COMPARISON: None. FINDINGS: There is periosteal reaction with cortical irregularity and cortical defect involving the distal femoral diaphysis posterolaterally. There are linear lucent areas extending through the cortex into the medullary cavity in those areas possibly representing  prior bone biopsy or tracks from prior fixation hardware. There is some mottled bone density involving the intramedullary area extending proximal and distal to that area. Within the overlying soft tissues in  "that area, there is a large multiloculated rim-enhancing fluid collection which extends into the vastus lateralis and vastus intermedius muscles. This largest component of this multiloculated fluid collection is located posterolaterally and measures 7 x 5 x 4 cm in size. There are other smaller components located anteriorly and posteriorly distal to the larger abscess. There are also small pockets of gas present within the distal posterior fluid collection. No significant knee or hip joint effusion.     1.  Cortical irregularity and breakthrough involving the distal femoral diaphysis posterolaterally with overlying periosteal reaction most likely representing osteomyelitis. This less likely represents a neoplastic process. There are linear lucent areas extending into that region possibly representing prior biopsy versus tracks from prior fixation hardware. 2.  Multiloculated rim-enhancing fluid collections within the soft tissues of the thigh in the area of the above-described abnormality. The largest collection is located intramuscular within the vastus lateralis muscle and measures 7 x 5 x 4 cm in size. Other components extend into the vastus intermedialis muscle. Some of these also contain gas and most likely represent abscesses.      Micro:  Results     Procedure Component Value Units Date/Time    BLOOD CULTURE x2 [[id number]] Collected:  12/11/19 0121    Order Status:  Completed Specimen:  Blood from Peripheral Updated:  12/16/19 0500     Significant Indicator NEG     Source BLD     Site PERIPHERAL     Culture Result No growth after 5 days of incubation.    Narrative:       Per Hospital Policy: Only change Specimen Src: to \"Line\" if  specified by physician order.  No site indicated    BLOOD CULTURE x2 [[id number]] Collected:  12/11/19 0121    Order Status:  Completed Specimen:  Blood from Peripheral Updated:  12/16/19 0500     Significant Indicator NEG     Source BLD     Site PERIPHERAL     Culture Result No " "growth after 5 days of incubation.    Narrative:       Per Hospital Policy: Only change Specimen Src: to \"Line\" if  specified by physician order.  No site indicated    AFB Culture [[id number]] Collected:  12/11/19 1002    Order Status:  Completed Specimen:  Tissue Updated:  12/14/19 0933     Significant Indicator NEG     Source TISS     Site right thigh tissue     Culture Result Culture in progress.     AFB Smear Results No acid fast bacilli seen.    Narrative:       Surgery Specimen    Fungal Culture [[id number]] Collected:  12/11/19 1002    Order Status:  Completed Specimen:  Tissue Updated:  12/14/19 0933     Significant Indicator NEG     Source TISS     Site right thigh tissue     Culture Result Culture in progress.    Narrative:       Surgery Specimen    CULTURE TISSUE W/ GRM STAIN [[id number]]  (Abnormal) Collected:  12/11/19 1002    Order Status:  Completed Specimen:  Tissue Updated:  12/14/19 0933     Significant Indicator POS     Source TISS     Site right thigh tissue     Culture Result -     Gram Stain Result Many WBCs.  No organisms seen.       Culture Result Klebsiella pneumoniae  Moderate growth  See previous culture for sensitivity report.      Narrative:       Surgery Specimen    Anaerobic Culture [[id number]] Collected:  12/11/19 1002    Order Status:  Completed Specimen:  Tissue Updated:  12/14/19 0933     Significant Indicator NEG     Source TISS     Site right thigh tissue     Culture Result No Anaerobes isolated.    Narrative:       Surgery Specimen    AFB Culture [[id number]] Collected:  12/11/19 0959    Order Status:  Completed Specimen:  Body Fluid Updated:  12/14/19 0933     Significant Indicator NEG     Source BF     Site Right thigh abscess     Culture Result Culture in progress.     AFB Smear Results No acid fast bacilli seen.    Narrative:       Surgery Specimen    Fungal Culture [[id number]] Collected:  12/11/19 0959    Order Status:  Completed Specimen:  Body Fluid Updated:  12/14/19 0933    "  Significant Indicator NEG     Source BF     Site Right thigh abscess     Culture Result Culture in progress.    Narrative:       Surgery Specimen    FLUID CULTURE W/GRAM STAIN [[id number]]  (Abnormal) Collected:  12/11/19 0959    Order Status:  Completed Specimen:  Body Fluid Updated:  12/14/19 0933     Significant Indicator POS     Source BF     Site Right thigh abscess     Culture Result -     Gram Stain Result Many WBCs.  Moderate Gram negative rods.       Culture Result Klebsiella pneumoniae  Heavy growth  See previous culture for sensitivity report.      Narrative:       Surgery Specimen    Anaerobic Culture [[id number]] Collected:  12/11/19 0959    Order Status:  Completed Specimen:  Body Fluid Updated:  12/14/19 0933     Significant Indicator NEG     Source BF     Site Right thigh abscess     Culture Result No Anaerobes isolated.    Narrative:       Surgery Specimen    CULTURE TISSUE W/ GRM STAIN [[id number]]  (Abnormal) Collected:  12/11/19 1002    Order Status:  Completed Specimen:  Bone Updated:  12/14/19 0932     Significant Indicator POS     Source BONE     Site right thigh bone     Culture Result -     Gram Stain Result Rare WBCs.  No organisms seen.       Culture Result Klebsiella pneumoniae  Light growth  See previous culture for sensitivity report.      Narrative:       Surgery Specimen    Anaerobic Culture [[id number]] Collected:  12/11/19 1002    Order Status:  Completed Specimen:  Bone Updated:  12/14/19 0932     Significant Indicator NEG     Source BONE     Site right thigh bone     Culture Result No Anaerobes isolated.    Narrative:       Surgery Specimen    Fungal Culture [[id number]] Collected:  12/11/19 1002    Order Status:  Completed Specimen:  Bone Updated:  12/14/19 0932     Significant Indicator NEG     Source BONE     Site right thigh bone     Culture Result Culture in progress.    Narrative:       Surgery Specimen    AFB Culture [[id number]] Collected:  12/11/19 1002    Order Status:   Completed Specimen:  Bone Updated:  12/14/19 0932     Significant Indicator NEG     Source BONE     Site right thigh bone     Culture Result Culture in progress.     AFB Smear Results No acid fast bacilli seen.    Narrative:       Surgery Specimen    AFB Culture [[id number]] Collected:  12/11/19 1002    Order Status:  Completed Specimen:  Tissue Updated:  12/14/19 0931     Significant Indicator NEG     Source TISS     Site Right thigh periosteum     Culture Result Culture in progress.     AFB Smear Results No acid fast bacilli seen.    Narrative:       Surgery Specimen    Fungal Culture [[id number]] Collected:  12/11/19 1002    Order Status:  Completed Specimen:  Tissue Updated:  12/14/19 0931     Significant Indicator NEG     Source TISS     Site Right thigh periosteum     Culture Result Culture in progress.    Narrative:       Surgery Specimen    CULTURE TISSUE W/ GRM STAIN [[id number]]  (Abnormal)  (Susceptibility) Collected:  12/11/19 1002    Order Status:  Completed Specimen:  Tissue Updated:  12/14/19 0931     Significant Indicator POS     Source TISS     Site Right thigh periosteum     Culture Result -     Gram Stain Result Many WBCs.  Rare Gram negative rods.       Culture Result Klebsiella pneumoniae  Moderate growth      Narrative:       Surgery Specimen    Susceptibility     Klebsiella pneumoniae (1)     Antibiotic Interpretation Microscan Method Status    Ampicillin Resistant >16 mcg/mL TONY Final    Ceftriaxone Sensitive <=1 mcg/mL TONY Final    Ceftazidime Sensitive <=1 mcg/mL TONY Final    Cefotaxime Sensitive <=2 mcg/mL TONY Final    Cefazolin Sensitive <=2 mcg/mL TONY Final    Ciprofloxacin Sensitive <=1 mcg/mL TONY Final    Ampicillin/sulbactam Sensitive <=8/4 mcg/mL TONY Final    Cefepime Sensitive <=2 mcg/mL TONY Final    Tobramycin Sensitive <=4 mcg/mL TONY Final    Cefotetan Sensitive <=16 mcg/mL TONY Final    Ertapenem Sensitive <=0.5 mcg/mL TONY Final    Gentamicin Sensitive <=4 mcg/mL TONY Final     Pip/Tazobactam Sensitive <=16 mcg/mL TONY Final    Trimeth/Sulfa Sensitive <=2/38 mcg/mL TONY Final                   Anaerobic Culture [[id number]] Collected:  12/11/19 1002    Order Status:  Completed Specimen:  Tissue Updated:  12/14/19 0931     Significant Indicator NEG     Source TISS     Site Right thigh periosteum     Culture Result No Anaerobes isolated.    Narrative:       Surgery Specimen    Acid Fast Stain [[id number]] Collected:  12/11/19 1002    Order Status:  Completed Specimen:  Tissue Updated:  12/11/19 1847     Significant Indicator NEG     Source TISS     Site Right thigh periosteum     AFB Smear Results No acid fast bacilli seen.    Narrative:       Surgery Specimen    GRAM STAIN [[id number]] Collected:  12/11/19 1002    Order Status:  Completed Specimen:  Bone Updated:  12/11/19 1847     Significant Indicator .     Source BONE     Site right thigh bone     Gram Stain Result Rare WBCs.  No organisms seen.      Narrative:       Surgery Specimen    GRAM STAIN [[id number]] Collected:  12/11/19 1002    Order Status:  Completed Specimen:  Tissue Updated:  12/11/19 1847     Significant Indicator .     Source TISS     Site Right thigh periosteum     Gram Stain Result Many WBCs.  Rare Gram negative rods.      Narrative:       Surgery Specimen    Acid Fast Stain [[id number]] Collected:  12/11/19 0959    Order Status:  Completed Specimen:  Body Fluid Updated:  12/11/19 1847     Significant Indicator NEG     Source BF     Site Right thigh abscess     AFB Smear Results No acid fast bacilli seen.    Narrative:       Surgery Specimen    Acid Fast Stain [[id number]] Collected:  12/11/19 1002    Order Status:  Completed Specimen:  Bone Updated:  12/11/19 1847     Significant Indicator NEG     Source BONE     Site right thigh bone     AFB Smear Results No acid fast bacilli seen.    Narrative:       Surgery Specimen    GRAM STAIN [[id number]] Collected:  12/11/19 0959    Order Status:  Completed Specimen:  Body Fluid  Updated:  12/11/19 1847     Significant Indicator .     Source BF     Site Right thigh abscess     Gram Stain Result Many WBCs.  Moderate Gram negative rods.      Narrative:       Surgery Specimen    Acid Fast Stain [[id number]] Collected:  12/11/19 1002    Order Status:  Completed Specimen:  Tissue Updated:  12/11/19 1846     Significant Indicator NEG     Source TISS     Site right thigh tissue     AFB Smear Results No acid fast bacilli seen.    Narrative:       Surgery Specimen    GRAM STAIN [[id number]] Collected:  12/11/19 1002    Order Status:  Completed Specimen:  Tissue Updated:  12/11/19 1846     Significant Indicator .     Source TISS     Site right thigh tissue     Gram Stain Result Many WBCs.  No organisms seen.      Narrative:       Surgery Specimen          Assessment:  Active Hospital Problems    Diagnosis   • *Acute hematogenous osteomyelitis of right femur ([facility]) [M86.051]   • Type 2 diabetes mellitus with hyperglycemia, with long-term current use of insulin ([facility]) [E11.65, Z79.4]   • Sepsis without acute organ dysfunction ([facility]) [A41.9]   Right thigh abscess    Plan:  Right femur osteomyelitis, recurrence-ongoing work-up  Etiology likely acute hematogenous  Was recently treated in [place] in September for 4 weeks in a SNF  Wound cultures + Klebsiella  Sed rate is 76 and CRP is 22.58  S/p saucerization of R femur on 12/11/19 by Dr. Rubin. Infected bone noted per OP note  Continue ceftriaxone 2 g daily  Plan for 6 weeks from 12/11  Stop date 01/22/20  Plan for I&D with Dr. Rubin on 12/17/2019    Right thigh abscess, on treatment  Underwent I&D on 12/11/2019  Cultures +Klebsiella  Abx per above    Type 2 Diabetes mellitus, uncontrolled  hemoglobin A1c of 10.3  Will adversely affect resolution of infection  Blood sugar control

## 2019-12-16 NOTE — PROGRESS NOTES
"   Orthopaedic PA Progress Note    Interval changes:Removing HV this afternoon, NPO pMN for repeat surgery tomorrow with Dr. Rubin    ROS - Patient denies any new issues. No chest pain, dyspnea, or fever.  Pain well controlled.    /81   Pulse 67   Temp 36.6 °C (97.9 °F) (Temporal)   Resp 15   Ht 1.626 m (5' 4\")   Wt 63 kg (138 lb 14.2 oz)   SpO2 94%     Patient seen and examined  No acute distress  Breathing non labored  RRR  Surgical dressing is clean, dry, and intact. Patient clearly fires tibialis anterior, EHL, and gastrocnemius/soleus. Sensation is intact to light touch throughout superficial peroneal, deep peroneal, tibial, saphenous, and sural nerve distributions. Strong and palpable 2+ dorsalis pedis and posterior tibial pulses with capillary refill less than 2 seconds. No lower leg tenderness or discomfort.    Active Hospital Problems    Diagnosis   • Acute hematogenous osteomyelitis of right femur ([facility]) [M86.051]     Priority: High   • Dehydration [E86.0]   • Type 2 diabetes mellitus with hyperglycemia, with long-term current use of insulin ([facility]) [E11.65, Z79.4]   • Sepsis without acute organ dysfunction ([facility]) [A41.9]     Assessment/Plan:  POD#4 S/P I+D Rt thigh bone and soft tissue  Wt bearing status - WBATPT/OT-initiated  Wound care:Dressing change tricia rrow  Drains - DC later today  Muro-*no  Sutures/Staples out- 10-14 days post operatively  Antibiotics: Rocephin per ID  DVT Prophylaxis- TEDS/SCDs/Foot pumps.   Lovenox: Start per Med, Duration-until ambulatory > 150'  Future Procedures - OR tomorrrow, NPO p MN  Case Coordination for Discharge Planning - Disposition pending further surgery with Dr. Rubin         "

## 2019-12-16 NOTE — PROGRESS NOTES
Spoke with CNA from previous night, She didn't empty the hemovac 12/15. So patient output was 25cc in the last 24 hours prior to this note.

## 2019-12-16 NOTE — PROGRESS NOTES
Hospital Medicine Daily Progress Note    Date of Service  12/16/2019    Chief Complaint  39 y.o. female admitted 12/10/2019 with right leg pain.    Hospital Course    This is a 39-year-old female with a past medical history of diabetes type 2 and recent treatment for right thigh osteomyelitis in [place] admitted with right leg pain.  The patient was found to have right femur osteomyelitis with abscess on CT scan.  Orthopedic surgery was consulted and the patient underwent I&D on 12/11/2019.  ID is following for antibiotics.      Interval Problem Update  12/12:  Cultures growing LFGR.  Afebrile.  BP soft.  Continue IV hydration.  Continues to complain of uncontrolled pain.  Medications adjusted.  Surgical site with dressing C/D/I and HV drain in place.  Blood sugars uncontrolled.  Insulin adjusted.   12/14:  Cx positive for Klebsiella.  Antibiotics adjusted.  Ambulating well.  Pain controlled this am.  VSS.  12/15:  Pain well controlled.  Ambulatory.  Blood sugars much better controlled.    12/16:  HV drain removed.  Anticipate repeat I&D tomorrow.  Reports she would like complete her 6 week antibiotic course with oral medications.  Discuss with ID.  Pain controlled.     Consultants/Specialty  Ortho surgery  ID    Code Status  FULL    Disposition  TBD.    Review of Systems  Review of Systems   Constitutional: Negative for fever and malaise/fatigue.   HENT: Negative for congestion and sore throat.    Eyes: Negative for blurred vision.   Respiratory: Negative for shortness of breath.    Cardiovascular: Negative for chest pain and leg swelling.   Gastrointestinal: Negative for abdominal pain, diarrhea, nausea and vomiting.   Genitourinary: Negative for dysuria and urgency.   Musculoskeletal: Positive for joint pain and myalgias. Negative for back pain.   Skin: Negative for rash.   Neurological: Negative for dizziness, tingling, tremors, sensory change, focal weakness and headaches.        Physical Exam  Temp:  [36.2  °C (97.1 °F)-36.8 °C (98.2 °F)] 36.6 °C (97.9 °F)  Pulse:  [60-78] 67  Resp:  [15-17] 15  BP: (111-134)/(63-85) 127/81  SpO2:  [94 %-98 %] 94 %    Physical Exam  Vitals signs and nursing note reviewed.   Constitutional:       General: She is not in acute distress.     Appearance: Normal appearance.   HENT:      Head: Normocephalic and atraumatic.      Nose: Nose normal.      Mouth/Throat:      Mouth: Mucous membranes are moist.      Pharynx: Oropharynx is clear.   Eyes:      General: No scleral icterus.     Conjunctiva/sclera: Conjunctivae normal.   Neck:      Musculoskeletal: Normal range of motion. No neck rigidity.   Cardiovascular:      Rate and Rhythm: Normal rate and regular rhythm.      Pulses: Normal pulses.      Heart sounds: Normal heart sounds.   Pulmonary:      Effort: Pulmonary effort is normal. No respiratory distress.      Breath sounds: Normal breath sounds. No wheezing or rales.   Abdominal:      General: Bowel sounds are normal. There is no distension.      Tenderness: There is no tenderness. There is no guarding.   Musculoskeletal:         General: Tenderness present. No swelling.      Right lower leg: No edema.      Left lower leg: No edema.      Comments: Right lower extremity mobility limited by pain.  S/P surgical I&D.    Skin:     General: Skin is warm and dry.      Capillary Refill: Capillary refill takes less than 2 seconds.      Coloration: Skin is not jaundiced or pale.   Neurological:      General: No focal deficit present.      Mental Status: She is alert and oriented to person, place, and time. Mental status is at baseline.   Psychiatric:         Mood and Affect: Mood normal.         Behavior: Behavior normal.         Thought Content: Thought content normal.         Judgment: Judgment normal.         Fluids    Intake/Output Summary (Last 24 hours) at 12/16/2019 1308  Last data filed at 12/16/2019 0900  Gross per 24 hour   Intake 240 ml   Output 11 ml   Net 229 ml       Laboratory                         Imaging  IR-PICC LINE PLACEMENT W/ GUIDANCE > AGE 5   Final Result                  Ultrasound-guided PICC placement performed by qualified nursing staff as    above.          DX-CHEST-FOR LINE PLACEMENT Perform procedure in: Patient's Room   Final Result      Peripherally inserted catheter has been placed and the tip projects appropriately over the superior vena cava.      CT-EXTREMITY, LOWER WITH RIGHT   Final Result      1.  Cortical irregularity and breakthrough involving the distal femoral diaphysis posterolaterally with overlying periosteal reaction most likely representing osteomyelitis. This less likely represents a neoplastic process. There are linear lucent areas    extending into that region possibly representing prior biopsy versus tracks from prior fixation hardware.      2.  Multiloculated rim-enhancing fluid collections within the soft tissues of the thigh in the area of the above-described abnormality. The largest collection is located intramuscular within the vastus lateralis muscle and measures 7 x 5 x 4 cm in size.    Other components extend into the vastus intermedialis muscle. Some of these also contain gas and most likely represent abscesses.           Assessment/Plan  * Acute hematogenous osteomyelitis of right femur (HCC)  Assessment & Plan  Recent I&D and extended antibiotic regimen for Klebsiella OM in [place]  Recurrent infection now with associated multiple abscess formation.    S/P I&D on 12/11.  HV in place.  Cultures growing Klebsiella  Continue ceftriaxone.  Anticipate 6 weeks of antibiotics  PICC line in place.  Repeat I&D tomorrow.   ID following  Tight glycemic control  PT/OT    Dehydration  Assessment & Plan  Resolved with IVF.     Sepsis without acute organ dysfunction (HCC)  Assessment & Plan  This is Sepsis Present on admission  SIRS criteria identified on my evaluation include: Tachycardia, with heart rate greater than 90 BPM and Leukocyosis, with WBC  greater than 12,000  Source is Osteomyelitis  Sepsis protocol initiated  Fluid resuscitation ordered per protocol  IV antibiotics as appropriate for source of sepsis  While organ dysfunction may be noted elsewhere in this problem list or in the chart, degree of organ dysfunction does not meet CMS criteria for severe sepsis  Sepsis resolved.           Type 2 diabetes mellitus with hyperglycemia, with long-term current use of insulin (HCC)  Assessment & Plan  Uncontrolled.  Hgb A1c 10.3, down from 14.3 in 2018  Blood sugars much improved.  Continue NPH 20 units BID  SSI  Diabetic diet  Accuchecks         VTE prophylaxis: SCDs/ASA.  Ambulatory

## 2019-12-17 ENCOUNTER — ANESTHESIA (OUTPATIENT)
Dept: SURGERY | Facility: MEDICAL CENTER | Age: 39
DRG: 854 | End: 2019-12-17
Payer: COMMERCIAL

## 2019-12-17 ENCOUNTER — ANESTHESIA EVENT (OUTPATIENT)
Dept: SURGERY | Facility: MEDICAL CENTER | Age: 39
DRG: 854 | End: 2019-12-17
Payer: COMMERCIAL

## 2019-12-17 PROBLEM — E86.0 DEHYDRATION: Status: RESOLVED | Noted: 2019-12-12 | Resolved: 2019-12-17

## 2019-12-17 LAB
ANION GAP SERPL CALC-SCNC: 9 MMOL/L (ref 0–11.9)
BASOPHILS # BLD AUTO: 0.3 % (ref 0–1.8)
BASOPHILS # BLD: 0.02 K/UL (ref 0–0.12)
BUN SERPL-MCNC: 15 MG/DL (ref 8–22)
CALCIUM SERPL-MCNC: 8.9 MG/DL (ref 8.5–10.5)
CHLORIDE SERPL-SCNC: 108 MMOL/L (ref 96–112)
CO2 SERPL-SCNC: 24 MMOL/L (ref 20–33)
CREAT SERPL-MCNC: 0.7 MG/DL (ref 0.5–1.4)
EOSINOPHIL # BLD AUTO: 0.09 K/UL (ref 0–0.51)
EOSINOPHIL NFR BLD: 1.4 % (ref 0–6.9)
ERYTHROCYTE [DISTWIDTH] IN BLOOD BY AUTOMATED COUNT: 40.4 FL (ref 35.9–50)
GLUCOSE BLD-MCNC: 132 MG/DL (ref 65–99)
GLUCOSE BLD-MCNC: 142 MG/DL (ref 65–99)
GLUCOSE BLD-MCNC: 238 MG/DL (ref 65–99)
GLUCOSE BLD-MCNC: 291 MG/DL (ref 65–99)
GLUCOSE BLD-MCNC: 84 MG/DL (ref 65–99)
GLUCOSE SERPL-MCNC: 171 MG/DL (ref 65–99)
HCG UR QL: NEGATIVE
HCT VFR BLD AUTO: 36.1 % (ref 37–47)
HGB BLD-MCNC: 11.8 G/DL (ref 12–16)
IMM GRANULOCYTES # BLD AUTO: 0.05 K/UL (ref 0–0.11)
IMM GRANULOCYTES NFR BLD AUTO: 0.8 % (ref 0–0.9)
LYMPHOCYTES # BLD AUTO: 2.28 K/UL (ref 1–4.8)
LYMPHOCYTES NFR BLD: 34.5 % (ref 22–41)
MCH RBC QN AUTO: 28.4 PG (ref 27–33)
MCHC RBC AUTO-ENTMCNC: 32.7 G/DL (ref 33.6–35)
MCV RBC AUTO: 86.8 FL (ref 81.4–97.8)
MONOCYTES # BLD AUTO: 0.41 K/UL (ref 0–0.85)
MONOCYTES NFR BLD AUTO: 6.2 % (ref 0–13.4)
NEUTROPHILS # BLD AUTO: 3.76 K/UL (ref 2–7.15)
NEUTROPHILS NFR BLD: 56.8 % (ref 44–72)
NRBC # BLD AUTO: 0 K/UL
NRBC BLD-RTO: 0 /100 WBC
PLATELET # BLD AUTO: 394 K/UL (ref 164–446)
PMV BLD AUTO: 9.4 FL (ref 9–12.9)
POTASSIUM SERPL-SCNC: 4.1 MMOL/L (ref 3.6–5.5)
RBC # BLD AUTO: 4.16 M/UL (ref 4.2–5.4)
SODIUM SERPL-SCNC: 141 MMOL/L (ref 135–145)
WBC # BLD AUTO: 6.6 K/UL (ref 4.8–10.8)

## 2019-12-17 PROCEDURE — 700111 HCHG RX REV CODE 636 W/ 250 OVERRIDE (IP): Performed by: ANESTHESIOLOGY

## 2019-12-17 PROCEDURE — 160027 HCHG SURGERY MINUTES - 1ST 30 MINS LEVEL 2: Performed by: ORTHOPAEDIC SURGERY

## 2019-12-17 PROCEDURE — 0YHC0YZ INSERTION OF OTHER DEVICE INTO RIGHT UPPER LEG, OPEN APPROACH: ICD-10-PCS | Performed by: ORTHOPAEDIC SURGERY

## 2019-12-17 PROCEDURE — A9270 NON-COVERED ITEM OR SERVICE: HCPCS | Performed by: PHYSICIAN ASSISTANT

## 2019-12-17 PROCEDURE — 700105 HCHG RX REV CODE 258: Performed by: PHYSICIAN ASSISTANT

## 2019-12-17 PROCEDURE — 81025 URINE PREGNANCY TEST: CPT

## 2019-12-17 PROCEDURE — 700105 HCHG RX REV CODE 258: Performed by: ANESTHESIOLOGY

## 2019-12-17 PROCEDURE — 700105 HCHG RX REV CODE 258: Performed by: INTERNAL MEDICINE

## 2019-12-17 PROCEDURE — 700102 HCHG RX REV CODE 250 W/ 637 OVERRIDE(OP): Performed by: NURSE PRACTITIONER

## 2019-12-17 PROCEDURE — 770006 HCHG ROOM/CARE - MED/SURG/GYN SEMI*

## 2019-12-17 PROCEDURE — 160002 HCHG RECOVERY MINUTES (STAT): Performed by: ORTHOPAEDIC SURGERY

## 2019-12-17 PROCEDURE — 0QB60ZZ EXCISION OF RIGHT UPPER FEMUR, OPEN APPROACH: ICD-10-PCS | Performed by: ORTHOPAEDIC SURGERY

## 2019-12-17 PROCEDURE — 99232 SBSQ HOSP IP/OBS MODERATE 35: CPT | Performed by: HOSPITALIST

## 2019-12-17 PROCEDURE — 160038 HCHG SURGERY MINUTES - EA ADDL 1 MIN LEVEL 2: Performed by: ORTHOPAEDIC SURGERY

## 2019-12-17 PROCEDURE — 700111 HCHG RX REV CODE 636 W/ 250 OVERRIDE (IP): Performed by: INTERNAL MEDICINE

## 2019-12-17 PROCEDURE — 501838 HCHG SUTURE GENERAL: Performed by: ORTHOPAEDIC SURGERY

## 2019-12-17 PROCEDURE — 700101 HCHG RX REV CODE 250: Performed by: ORTHOPAEDIC SURGERY

## 2019-12-17 PROCEDURE — A9270 NON-COVERED ITEM OR SERVICE: HCPCS | Performed by: NURSE PRACTITIONER

## 2019-12-17 PROCEDURE — 160035 HCHG PACU - 1ST 60 MINS PHASE I: Performed by: ORTHOPAEDIC SURGERY

## 2019-12-17 PROCEDURE — 82962 GLUCOSE BLOOD TEST: CPT | Mod: 91

## 2019-12-17 PROCEDURE — 700111 HCHG RX REV CODE 636 W/ 250 OVERRIDE (IP): Performed by: HOSPITALIST

## 2019-12-17 PROCEDURE — 110371 HCHG SHELL REV 272: Performed by: ORTHOPAEDIC SURGERY

## 2019-12-17 PROCEDURE — 501330 HCHG SET, CYSTO IRRIG TUBING: Performed by: ORTHOPAEDIC SURGERY

## 2019-12-17 PROCEDURE — 700102 HCHG RX REV CODE 250 W/ 637 OVERRIDE(OP): Performed by: PHYSICIAN ASSISTANT

## 2019-12-17 PROCEDURE — 160048 HCHG OR STATISTICAL LEVEL 1-5: Performed by: ORTHOPAEDIC SURGERY

## 2019-12-17 PROCEDURE — 500881 HCHG PACK, EXTREMITY: Performed by: ORTHOPAEDIC SURGERY

## 2019-12-17 PROCEDURE — 160009 HCHG ANES TIME/MIN: Performed by: ORTHOPAEDIC SURGERY

## 2019-12-17 DEVICE — BEADS STIMULAN CALCIUM SULFATE: Type: IMPLANTABLE DEVICE | Status: FUNCTIONAL

## 2019-12-17 RX ORDER — DIPHENHYDRAMINE HYDROCHLORIDE 50 MG/ML
12.5 INJECTION INTRAMUSCULAR; INTRAVENOUS
Status: DISCONTINUED | OUTPATIENT
Start: 2019-12-17 | End: 2019-12-17 | Stop reason: HOSPADM

## 2019-12-17 RX ORDER — KETOROLAC TROMETHAMINE 30 MG/ML
INJECTION, SOLUTION INTRAMUSCULAR; INTRAVENOUS PRN
Status: DISCONTINUED | OUTPATIENT
Start: 2019-12-17 | End: 2019-12-17 | Stop reason: SURG

## 2019-12-17 RX ORDER — HYDROMORPHONE HYDROCHLORIDE 1 MG/ML
0.4 INJECTION, SOLUTION INTRAMUSCULAR; INTRAVENOUS; SUBCUTANEOUS
Status: DISCONTINUED | OUTPATIENT
Start: 2019-12-17 | End: 2019-12-17 | Stop reason: HOSPADM

## 2019-12-17 RX ORDER — OXYCODONE HYDROCHLORIDE AND ACETAMINOPHEN 5; 325 MG/1; MG/1
1 TABLET ORAL
Status: COMPLETED | OUTPATIENT
Start: 2019-12-17 | End: 2019-12-17

## 2019-12-17 RX ORDER — BUPIVACAINE HYDROCHLORIDE AND EPINEPHRINE 5; 5 MG/ML; UG/ML
INJECTION, SOLUTION EPIDURAL; INTRACAUDAL; PERINEURAL
Status: DISCONTINUED | OUTPATIENT
Start: 2019-12-17 | End: 2019-12-17 | Stop reason: HOSPADM

## 2019-12-17 RX ORDER — OXYCODONE HYDROCHLORIDE AND ACETAMINOPHEN 5; 325 MG/1; MG/1
2 TABLET ORAL
Status: COMPLETED | OUTPATIENT
Start: 2019-12-17 | End: 2019-12-17

## 2019-12-17 RX ORDER — LABETALOL HYDROCHLORIDE 5 MG/ML
5 INJECTION, SOLUTION INTRAVENOUS
Status: DISCONTINUED | OUTPATIENT
Start: 2019-12-17 | End: 2019-12-17 | Stop reason: HOSPADM

## 2019-12-17 RX ORDER — HYDRALAZINE HYDROCHLORIDE 20 MG/ML
5 INJECTION INTRAMUSCULAR; INTRAVENOUS
Status: DISCONTINUED | OUTPATIENT
Start: 2019-12-17 | End: 2019-12-17 | Stop reason: HOSPADM

## 2019-12-17 RX ORDER — METOPROLOL TARTRATE 1 MG/ML
1 INJECTION, SOLUTION INTRAVENOUS
Status: DISCONTINUED | OUTPATIENT
Start: 2019-12-17 | End: 2019-12-17 | Stop reason: HOSPADM

## 2019-12-17 RX ORDER — HYDROMORPHONE HYDROCHLORIDE 1 MG/ML
0.2 INJECTION, SOLUTION INTRAMUSCULAR; INTRAVENOUS; SUBCUTANEOUS
Status: DISCONTINUED | OUTPATIENT
Start: 2019-12-17 | End: 2019-12-17 | Stop reason: HOSPADM

## 2019-12-17 RX ORDER — SODIUM CHLORIDE 9 MG/ML
INJECTION, SOLUTION INTRAVENOUS
Status: DISCONTINUED | OUTPATIENT
Start: 2019-12-17 | End: 2019-12-17 | Stop reason: SURG

## 2019-12-17 RX ORDER — MIDAZOLAM HYDROCHLORIDE 1 MG/ML
INJECTION INTRAMUSCULAR; INTRAVENOUS PRN
Status: DISCONTINUED | OUTPATIENT
Start: 2019-12-17 | End: 2019-12-17 | Stop reason: SURG

## 2019-12-17 RX ORDER — DEXAMETHASONE SODIUM PHOSPHATE 4 MG/ML
INJECTION, SOLUTION INTRA-ARTICULAR; INTRALESIONAL; INTRAMUSCULAR; INTRAVENOUS; SOFT TISSUE PRN
Status: DISCONTINUED | OUTPATIENT
Start: 2019-12-17 | End: 2019-12-17 | Stop reason: SURG

## 2019-12-17 RX ORDER — HALOPERIDOL 5 MG/ML
1 INJECTION INTRAMUSCULAR
Status: DISCONTINUED | OUTPATIENT
Start: 2019-12-17 | End: 2019-12-17 | Stop reason: HOSPADM

## 2019-12-17 RX ORDER — SODIUM CHLORIDE, SODIUM LACTATE, POTASSIUM CHLORIDE, CALCIUM CHLORIDE 600; 310; 30; 20 MG/100ML; MG/100ML; MG/100ML; MG/100ML
INJECTION, SOLUTION INTRAVENOUS CONTINUOUS
Status: DISCONTINUED | OUTPATIENT
Start: 2019-12-17 | End: 2019-12-17 | Stop reason: HOSPADM

## 2019-12-17 RX ORDER — HYDROMORPHONE HYDROCHLORIDE 1 MG/ML
0.1 INJECTION, SOLUTION INTRAMUSCULAR; INTRAVENOUS; SUBCUTANEOUS
Status: DISCONTINUED | OUTPATIENT
Start: 2019-12-17 | End: 2019-12-17 | Stop reason: HOSPADM

## 2019-12-17 RX ORDER — ONDANSETRON 2 MG/ML
INJECTION INTRAMUSCULAR; INTRAVENOUS PRN
Status: DISCONTINUED | OUTPATIENT
Start: 2019-12-17 | End: 2019-12-17 | Stop reason: SURG

## 2019-12-17 RX ADMIN — HALOPERIDOL LACTATE 1 MG: 5 INJECTION, SOLUTION INTRAMUSCULAR at 09:06

## 2019-12-17 RX ADMIN — SODIUM CHLORIDE: 9 INJECTION, SOLUTION INTRAVENOUS at 07:32

## 2019-12-17 RX ADMIN — HYDROMORPHONE HYDROCHLORIDE 0.4 MG: 1 INJECTION, SOLUTION INTRAMUSCULAR; INTRAVENOUS; SUBCUTANEOUS at 09:20

## 2019-12-17 RX ADMIN — FENTANYL CITRATE 100 MCG: 50 INJECTION, SOLUTION INTRAMUSCULAR; INTRAVENOUS at 08:16

## 2019-12-17 RX ADMIN — INSULIN HUMAN 20 UNITS: 100 INJECTION, SUSPENSION SUBCUTANEOUS at 17:24

## 2019-12-17 RX ADMIN — INSULIN LISPRO 7 UNITS: 100 INJECTION, SOLUTION INTRAVENOUS; SUBCUTANEOUS at 17:24

## 2019-12-17 RX ADMIN — FENTANYL CITRATE 50 MCG: 0.05 INJECTION, SOLUTION INTRAMUSCULAR; INTRAVENOUS at 09:04

## 2019-12-17 RX ADMIN — INSULIN HUMAN 20 UNITS: 100 INJECTION, SUSPENSION SUBCUTANEOUS at 10:37

## 2019-12-17 RX ADMIN — DIPHENHYDRAMINE HYDROCHLORIDE 12.5 MG: 50 INJECTION INTRAMUSCULAR; INTRAVENOUS at 09:20

## 2019-12-17 RX ADMIN — ONDANSETRON 4 MG: 2 INJECTION INTRAMUSCULAR; INTRAVENOUS at 08:30

## 2019-12-17 RX ADMIN — FENTANYL CITRATE 50 MCG: 50 INJECTION, SOLUTION INTRAMUSCULAR; INTRAVENOUS at 08:23

## 2019-12-17 RX ADMIN — DEXAMETHASONE SODIUM PHOSPHATE 4 MG: 4 INJECTION, SOLUTION INTRA-ARTICULAR; INTRALESIONAL; INTRAMUSCULAR; INTRAVENOUS; SOFT TISSUE at 07:40

## 2019-12-17 RX ADMIN — FENTANYL CITRATE 50 MCG: 0.05 INJECTION, SOLUTION INTRAMUSCULAR; INTRAVENOUS at 09:08

## 2019-12-17 RX ADMIN — ONDANSETRON 4 MG: 2 INJECTION INTRAMUSCULAR; INTRAVENOUS at 09:03

## 2019-12-17 RX ADMIN — INSULIN LISPRO 4 UNITS: 100 INJECTION, SOLUTION INTRAVENOUS; SUBCUTANEOUS at 21:24

## 2019-12-17 RX ADMIN — MIDAZOLAM 2 MG: 1 INJECTION INTRAMUSCULAR; INTRAVENOUS at 07:32

## 2019-12-17 RX ADMIN — SODIUM CHLORIDE: 9 INJECTION, SOLUTION INTRAVENOUS at 14:44

## 2019-12-17 RX ADMIN — FENTANYL CITRATE 100 MCG: 50 INJECTION, SOLUTION INTRAMUSCULAR; INTRAVENOUS at 07:54

## 2019-12-17 RX ADMIN — ASPIRIN 81 MG: 81 TABLET, COATED ORAL at 04:26

## 2019-12-17 RX ADMIN — HYDROMORPHONE HYDROCHLORIDE 0.4 MG: 1 INJECTION, SOLUTION INTRAMUSCULAR; INTRAVENOUS; SUBCUTANEOUS at 09:13

## 2019-12-17 RX ADMIN — ACETAMINOPHEN 1000 MG: 500 TABLET ORAL at 04:35

## 2019-12-17 RX ADMIN — PROPOFOL 150 MG: 10 INJECTION, EMULSION INTRAVENOUS at 07:54

## 2019-12-17 RX ADMIN — CEFTRIAXONE SODIUM 2 G: 2 INJECTION, POWDER, FOR SOLUTION INTRAMUSCULAR; INTRAVENOUS at 04:27

## 2019-12-17 RX ADMIN — KETOROLAC TROMETHAMINE 30 MG: 30 INJECTION, SOLUTION INTRAMUSCULAR at 07:46

## 2019-12-17 ASSESSMENT — ENCOUNTER SYMPTOMS
NAUSEA: 0
VOMITING: 0
BLURRED VISION: 0
DIZZINESS: 0
SORE THROAT: 0
HEADACHES: 0
FEVER: 0
MYALGIAS: 1
FOCAL WEAKNESS: 0
BACK PAIN: 0
DIARRHEA: 0
COUGH: 0
ABDOMINAL PAIN: 0
SHORTNESS OF BREATH: 0
SENSORY CHANGE: 0
CHILLS: 0
FALLS: 0
NERVOUS/ANXIOUS: 0

## 2019-12-17 ASSESSMENT — PAIN SCALES - GENERAL: PAIN_LEVEL: 0

## 2019-12-17 NOTE — CARE PLAN
Problem: Safety  Goal: Will remain free from falls  Outcome: PROGRESSING AS EXPECTED  Intervention: Implement fall precautions  Flowsheets (Taken 12/17/2019 1026)  Environmental Precautions: Treaded Slipper Socks on Patient;Transferred to Stronger Side;Bed in Low Position;Communication Sign for Patients & Families;Mobility Assessed & Appropriate Sign Placed;Report Given to Other Health Care Providers Regarding Fall Risk;Personal Belongings, Wastebasket, Call Bell etc. in Easy Reach  Note:   Safety precautions in place. Call light within reach. Bed is low and in locked position. Hourly rounding in place.       Problem: Pain Management  Goal: Pain level will decrease to patient's comfort goal  Outcome: PROGRESSING AS EXPECTED  Intervention: Educate and implement non-pharmacologic comfort measures. Examples: relaxation, distration, play therapy, activity therapy, massage, etc.  Note:   Pt rated her pain 1/10  Extra pillows and periods of rest provided

## 2019-12-17 NOTE — ANESTHESIA QCDR
2019 [facility] Clinical Data Registry (for Quality Improvement)     Postoperative nausea/vomiting risk protocol (Adult = 18 yrs and Pediatric 3-17 yrs)- (430 and 463)  General inhalation anesthetic (NOT TIVA) with PONV risk factors: Yes  Provision of anti-emetic therapy with at least 2 different classes of agents: Yes   Patient DID NOT receive anti-emetic therapy and reason is documented in Medical Record:  N/A    Multimodal Pain Management- (AQI59)  Patient undergoing Elective Surgery (i.e. Outpatient, or ASC, or Prescheduled Surgery prior to Hospital Admission): No  Use of Multimodal Pain Management, two or more drugs and/or interventions, NOT including systemic opioids: N/A  Exception: Documented allergy to multiple classes of analgesics: N/A    PACU assessment of acute postoperative pain prior to Anesthesia Care End- Applies to Patients Age = 18- (ABG7)  Initial PACU pain score is which of the following: < 7/10  Patient unable to report pain score: N/A    Post-anesthetic transfer of care checklist/protocol to PACU/ICU- (426 and 427)  Upon conclusion of case, patient transferred to which of the following locations: PACU/Non-ICU  Use of transfer checklist/protocol: Yes  Exclusion: Service Performed in Patient Hospital Room (and thus did not require transfer): N/A    PACU Reintubation- (AQI31)  General anesthesia requiring endotracheal intubation (ETT) along with subsequent extubation in OR or PACU: No  Required reintubation in the PACU: N/A  Extubation was a planned trial documented in the medical record prior to removal of the original airway device: N/A    Unplanned admission to ICU related to anesthesia service up through end of PACU care- (MD51)  Unplanned admission to ICU (not initially anticipated at anesthesia start time): No

## 2019-12-17 NOTE — PROGRESS NOTES
Report given to Preop nurse, Na. CHG bath completed, belongings at bedside. Saline-locked. Pregnancy test neg.

## 2019-12-17 NOTE — ANESTHESIA PROCEDURE NOTES
Airway  Date/Time: 12/17/2019 7:54 AM  Performed by: Grant Wilkes D.O.  Authorized by: Grant Wilkes D.O.     Location:  OR  Urgency:  Elective  Indications for Airway Management:  Anesthesia  Spontaneous Ventilation: absent    Sedation Level:  Deep  Preoxygenated: Yes    Mask Difficulty Assessment:  1 - vent by mask  Final Airway Type:  Supraglottic airway  Final Supraglottic Airway:  Standard LMA  SGA Size:  4  Cuff Pressure (cm H2O):  30  Number of Attempts at Approach:  1

## 2019-12-17 NOTE — ANESTHESIA TIME REPORT
Anesthesia Start and Stop Event Times     Date Time Event    12/17/2019 0717 Ready for Procedure     0732 Anesthesia Start     0858 Anesthesia Stop        Responsible Staff  12/17/19    Name Role Begin End    Grant Wilkes D.O. Anesth 0732 0858        Preop Diagnosis (Free Text):  Pre-op Diagnosis     right femur osteomyelitis        Preop Diagnosis (Codes):  Diagnosis Information     Diagnosis Code(s): Acute hematogenous osteomyelitis of right femur (HCC) [M86.051]        Post op Diagnosis  Osteomyelitis of right femur (HCC)      Premium Reason  Non-Premium    Comments:

## 2019-12-17 NOTE — PROGRESS NOTES
Pt arrived back to the floor from PACU  Pt is AO x 4  On 2L of o2 via NC   on  SCDs on  Dressing to the right thigh is clean, dry and intact  Rates pain 1/10  Denies nausea or any numbness or tingling

## 2019-12-17 NOTE — ANESTHESIA POSTPROCEDURE EVALUATION
Patient: Tonya Bro    Procedure Summary     Date:  12/17/19 Room / Location:  [place] [address] / SURGERY [facility]    Anesthesia Start:  0732 Anesthesia Stop:  0858    Procedure:  IRRIGATION AND DEBRIDEMENT, WOUND- THIGH, AND ANTIBIOTIC DELIVERY DEVICE PLACEMENT (Right Leg Upper) Diagnosis:       Acute hematogenous osteomyelitis of right femur (HCC)      (right femur osteomyelitis)    Surgeon:  Shamar Rubin M.D. Responsible Provider:  Grant Wilkes D.O.    Anesthesia Type:  general ASA Status:  3          Final Anesthesia Type: general  Last vitals  BP   Blood Pressure: 126/89    Temp   36.9 °C (98.4 °F)    Pulse   Pulse: 67   Resp   16    SpO2   94 %      Anesthesia Post Evaluation    Patient location during evaluation: PACU  Patient participation: complete - patient participated  Level of consciousness: awake and alert  Pain score: 0    Airway patency: patent  Anesthetic complications: no  Cardiovascular status: hemodynamically stable  Respiratory status: acceptable  Hydration status: euvolemic    PONV: none           Nurse Pain Score: 1 (NPRS)

## 2019-12-17 NOTE — ANESTHESIA PREPROCEDURE EVALUATION
Relevant Problems   ENDO   (+) Type 2 diabetes mellitus with hyperglycemia, with long-term current use of insulin (HCC)      Other   (+) Acute hematogenous osteomyelitis of right femur (HCC)       Physical Exam    Airway   Mallampati: II  TM distance: >3 FB  Neck ROM: full       Cardiovascular - normal exam  Rhythm: regular  Rate: normal  (-) murmur     Dental - normal exam         Pulmonary - normal exam  Breath sounds clear to auscultation     Abdominal    Neurological - normal exam                 Anesthesia Plan    ASA 3   ASA physical status 3 criteria: diabetes - poorly controlled    Plan - general       Airway plan will be LMA        Induction: intravenous    Postoperative Plan: Postoperative administration of opioids is intended.    Pertinent diagnostic labs and testing reviewed    Informed Consent:    Anesthetic plan and risks discussed with patient.    Use of blood products discussed with: patient whom consented to blood products.

## 2019-12-17 NOTE — PROGRESS NOTES
"Seen and examined.  Doing OK today.    /89   Pulse 67   Temp 36.9 °C (98.4 °F) (Temporal)   Resp 16   Ht 1.626 m (5' 4\")   Wt 63 kg (138 lb 14.2 oz)   SpO2 94%     Exam:  RLE NV intact, incision c/d/i    #1 Right thigh abscess and femur osteomyelitis    Plan:  - Repeat I&D today  - NPO  - ABX per ID  "

## 2019-12-17 NOTE — PROGRESS NOTES
Patient received from OR at 0856, bedside report received from anesthesia/OR RN, 2 patient identifiers confirmed . Patient connected to monitors, assessed for general head to toe and pain/nausea. Ordered reviewed and checked. family updated via telephone on patient status, RN left message.  Patient meets criteria for D/C to phase II. Report called to floor RN.

## 2019-12-17 NOTE — OR NURSING
Physician contacted regarding bloodless program, bloodless consent signed, social consult placed, bloodless listed as an allergy, patient has bloodless armband and stickers on chart.

## 2019-12-17 NOTE — OP REPORT
DATE OF SERVICE:  12/17/2019    PREOPERATIVE DIAGNOSIS:  Right distal femur osteomyelitis.    POSTOPERATIVE DIAGNOSIS:  Right distal femur osteomyelitis.    PROCEDURES:    1.  Irrigation and excisional debridement of right thigh wound, deepest level   of debridement was bone.  2.  Placement of antibiotic delivery device, right femur.    SURGEON:  Shamar Rubin MD    ASSISTANT:  None.    ANESTHESIOLOGIST:  Grant Wilkes DO    ANESTHESIA TYPE:  General.    SPECIMENS:  None.    ESTIMATED BLOOD LOSS:  50 mL.    COMPLICATIONS:  None.    OPERATIVE INDICATIONS:  The patient is a 39-year-old female who underwent an   incision and drainage of her thigh abscess and saucerization of her femur for   treatment of osteomyelitis and infection was diagnosed as Klebsiella.  Her   drainage then removed and drain output improved.  She has been afebrile and   has been feeling better.  Given these findings, she is an appropriately   indicated candidate for repeat debridement and antibiotic delivery device   placement.  I discussed risks, benefits and alternatives including the risk of   persistent infection, wound healing complications, neurovascular injury,   blood loss, and medical risks of anesthesia.  We discussed benefits including   improved chance of infection clearance and alternatives including nonoperative   management.  Informed consent was signed and documented.  I met with her   preoperatively and marked the operative extremity.    OPERATIVE COURSE:  She underwent general anesthesia and was positioned supine.    All bony prominences were well padded.  The right lower extremity was   prepped and draped in sterile orthopedic fashion using ChloraPrep and the   surgical team scrubbed in.  A procedural pause was conducted to verify correct   patient, correct extremity, presence of the surgeon's initials on the   operative extremity and administration of IV antibiotics, in this case Ancef.    Following generalized  agreement, the old incision was reopened sharply,   dissected down to the fascia and elevated the vastus off of the femur.  No   purulence was encountered.  Some devitalized tissue was encountered and this   was removed with rongeurs and curettes from the vastus and from the fascia.    The bone was debrided with a curette and cleared of any unhealthy-appearing   tissue.  When only healthy-appearing tissue remained, we thoroughly irrigated   the wound with copious amounts of normal saline.  Stimulan cement beads with   tobramycin added were fashioned at the beginning of the case and then left behind in the wound   with some of the stimulan placed in the femur itself.  The wound was then   closed with 0 PDS, 2-0 Monocryl, and staples.  The wound was anesthetized and   sterile dressings were applied.  Patient was transferred to recovery room in   stable condition, sustaining no complications.    POSTOPERATIVE PLAN:  1.  Weightbear as tolerated.  PT for mobilization.  2.  DVT prophylaxis with SCDs and Lovenox.  3.  IV antibiotics per ID.  Okay to transition to oral antibiotics per ID.  5.  Discharge home at the discretion of medicine.       ____________________________________     MD EDGARDO Trujillo / SOPHIE    DD:  12/17/2019 11:01:55  DT:  12/17/2019 11:42:13    D#:  [id number]  Job#:  [id number]

## 2019-12-17 NOTE — PROGRESS NOTES
Hospital Medicine Daily Progress Note    Date of Service  12/17/2019    Chief Complaint  39 y.o. female admitted 12/10/2019 with right leg pain.    Hospital Course    This is a 39-year-old female with a past medical history of diabetes type 2 and recent treatment for right thigh osteomyelitis in [place] admitted with right leg pain.  The patient was found to have right femur osteomyelitis with abscess on CT scan.  Orthopedic surgery was consulted and the patient underwent I&D on 12/11/2019.  ID is following for antibiotics.      Interval Problem Update  12/12:  Cultures growing LFGR.  Afebrile.  BP soft.  Continue IV hydration.  Continues to complain of uncontrolled pain.  Medications adjusted.  Surgical site with dressing C/D/I and HV drain in place.  Blood sugars uncontrolled.  Insulin adjusted.   12/14:  Cx positive for Klebsiella.  Antibiotics adjusted.  Ambulating well.  Pain controlled this am.  VSS.  12/15:  Pain well controlled.  Ambulatory.  Blood sugars much better controlled.    12/16:  HV drain removed.  Anticipate repeat I&D tomorrow.  Reports she would like complete her 6 week antibiotic course with oral medications.  Discuss with ID.  Pain controlled.   12/17: Sitting up in bed, no distress. Just returned from the OR for repeat I&D and placement of abx delivery device to the right femur. Will be on abx through 1/22.    Consultants/Specialty  Ortho surgery  ID    Code Status  FULL    Disposition  Pending PT/OT evaluation and no further procedures.     Review of Systems  Review of Systems   Constitutional: Negative for chills, fever and malaise/fatigue.   HENT: Negative for congestion and sore throat.    Eyes: Negative for blurred vision.   Respiratory: Negative for cough and shortness of breath.    Cardiovascular: Negative for chest pain and leg swelling.   Gastrointestinal: Negative for abdominal pain, diarrhea, nausea and vomiting.   Genitourinary: Negative for dysuria and urgency.    Musculoskeletal: Positive for joint pain (right LE) and myalgias. Negative for back pain and falls.   Skin: Negative for rash.   Neurological: Negative for dizziness, sensory change, focal weakness and headaches.   Psychiatric/Behavioral: The patient is not nervous/anxious.    All other systems reviewed and are negative.       Physical Exam  Temp:  [36 °C (96.8 °F)-36.9 °C (98.4 °F)] 36.9 °C (98.4 °F)  Pulse:  [62-79] 67  Resp:  [16-18] 16  BP: (115-133)/(75-93) 126/89  SpO2:  [94 %-100 %] 94 %    Physical Exam  Vitals signs reviewed.   Constitutional:       General: She is not in acute distress.     Appearance: Normal appearance.   HENT:      Head: Normocephalic.      Mouth/Throat:      Mouth: Mucous membranes are moist.      Pharynx: Oropharynx is clear.   Eyes:      General: No scleral icterus.     Extraocular Movements: Extraocular movements intact.   Neck:      Musculoskeletal: Normal range of motion. No neck rigidity.   Cardiovascular:      Rate and Rhythm: Normal rate and regular rhythm.      Pulses: Normal pulses.      Heart sounds: Normal heart sounds.   Pulmonary:      Effort: Pulmonary effort is normal. No respiratory distress.      Breath sounds: Normal breath sounds. No wheezing or rales.   Abdominal:      General: Bowel sounds are normal. There is no distension.      Tenderness: There is no tenderness.   Musculoskeletal:         General: Tenderness present.      Right lower leg: No edema.      Left lower leg: No edema.      Comments: Right lower extremity mobility limited by pain.  S/P surgical I&D.    Skin:     General: Skin is warm and dry.      Capillary Refill: Capillary refill takes less than 2 seconds.      Coloration: Skin is not jaundiced.      Comments: Right PICC line   Neurological:      General: No focal deficit present.      Mental Status: She is alert and oriented to person, place, and time. Mental status is at baseline.   Psychiatric:         Attention and Perception: Attention normal.          Mood and Affect: Mood normal.         Behavior: Behavior normal.         Thought Content: Thought content normal.         Fluids    Intake/Output Summary (Last 24 hours) at 12/17/2019 0818  Last data filed at 12/17/2019 0600  Gross per 24 hour   Intake 680 ml   Output 100 ml   Net 580 ml       Laboratory  Recent Labs     12/16/19  2350   WBC 6.6   RBC 4.16*   HEMOGLOBIN 11.8*   HEMATOCRIT 36.1*   MCV 86.8   MCH 28.4   MCHC 32.7*   RDW 40.4   PLATELETCT 394   MPV 9.4     Recent Labs     12/16/19  2350   SODIUM 141   POTASSIUM 4.1   CHLORIDE 108   CO2 24   GLUCOSE 171*   BUN 15   CREATININE 0.70   CALCIUM 8.9                   Imaging  IR-PICC LINE PLACEMENT W/ GUIDANCE > AGE 5   Final Result                  Ultrasound-guided PICC placement performed by qualified nursing staff as    above.          DX-CHEST-FOR LINE PLACEMENT Perform procedure in: Patient's Room   Final Result      Peripherally inserted catheter has been placed and the tip projects appropriately over the superior vena cava.      CT-EXTREMITY, LOWER WITH RIGHT   Final Result      1.  Cortical irregularity and breakthrough involving the distal femoral diaphysis posterolaterally with overlying periosteal reaction most likely representing osteomyelitis. This less likely represents a neoplastic process. There are linear lucent areas    extending into that region possibly representing prior biopsy versus tracks from prior fixation hardware.      2.  Multiloculated rim-enhancing fluid collections within the soft tissues of the thigh in the area of the above-described abnormality. The largest collection is located intramuscular within the vastus lateralis muscle and measures 7 x 5 x 4 cm in size.    Other components extend into the vastus intermedialis muscle. Some of these also contain gas and most likely represent abscesses.           Assessment/Plan  * Acute hematogenous osteomyelitis of right femur (HCC)- (present on admission)  Assessment &  Plan  Recent I&D and extended antibiotic regimen for Klebsiella OM in [place]. Recurrent infection now with associated multiple abscess formation. OR culture from 12/11 with Klebsiella pneumoniae.   - S/P I&D on 12/11 and 12/17  - ID and ortho following, appreciate  - continue with abx for 6 weeks, through 1/22/2020  - will need tight glycemic control for healing    Sepsis without acute organ dysfunction (HCC)- (present on admission)  Assessment & Plan  Resolved. This is Sepsis Present on admission  SIRS criteria identified on my evaluation include: Tachycardia, with heart rate greater than 90 BPM and Leukocyosis, with WBC greater than 12,000  Source is Osteomyelitis. Sepsis protocol initiated.    Type 2 diabetes mellitus with hyperglycemia, with long-term current use of insulin (HCC)- (present on admission)  Assessment & Plan  Uncontrolled, with hyperglycemia. A21C 10.3%, improved from 2018. Better control over the last 24 hours.   - continue NPH 20 units BID  - resistant sliding scale  - DM diet and hypoglycemia protocol       VTE prophylaxis: Patient is ambulatory.

## 2019-12-17 NOTE — CARE PLAN
Problem: Infection  Goal: Will remain free from infection  Outcome: PROGRESSING AS EXPECTED     Problem: Venous Thromboembolism (VTW)/Deep Vein Thrombosis (DVT) Prevention:  Goal: Patient will participate in Venous Thrombosis (VTE)/Deep Vein Thrombosis (DVT)Prevention Measures  Outcome: PROGRESSING AS EXPECTED  Note:   Pt ambulates frequently     Problem: Pain Management  Goal: Pain level will decrease to patient's comfort goal  Outcome: PROGRESSING AS EXPECTED     Problem: Respiratory:  Goal: Respiratory status will improve  Outcome: PROGRESSING AS EXPECTED

## 2019-12-18 ENCOUNTER — PATIENT OUTREACH (OUTPATIENT)
Dept: HEALTH INFORMATION MANAGEMENT | Facility: OTHER | Age: 39
End: 2019-12-18

## 2019-12-18 VITALS
BODY MASS INDEX: 23.71 KG/M2 | TEMPERATURE: 97.9 F | SYSTOLIC BLOOD PRESSURE: 144 MMHG | RESPIRATION RATE: 17 BRPM | OXYGEN SATURATION: 95 % | DIASTOLIC BLOOD PRESSURE: 103 MMHG | WEIGHT: 138.89 LBS | HEIGHT: 64 IN | HEART RATE: 63 BPM

## 2019-12-18 PROBLEM — B37.31 VAGINAL YEAST INFECTION: Status: RESOLVED | Noted: 2018-11-26 | Resolved: 2019-12-18

## 2019-12-18 PROBLEM — N30.00 ACUTE CYSTITIS WITHOUT HEMATURIA: Status: RESOLVED | Noted: 2018-11-26 | Resolved: 2019-12-18

## 2019-12-18 PROBLEM — A41.9 SEPSIS WITHOUT ACUTE ORGAN DYSFUNCTION (HCC): Status: RESOLVED | Noted: 2019-12-11 | Resolved: 2019-12-18

## 2019-12-18 LAB
ANION GAP SERPL CALC-SCNC: 10 MMOL/L (ref 0–11.9)
BUN SERPL-MCNC: 14 MG/DL (ref 8–22)
CALCIUM SERPL-MCNC: 8.8 MG/DL (ref 8.5–10.5)
CHLORIDE SERPL-SCNC: 108 MMOL/L (ref 96–112)
CO2 SERPL-SCNC: 23 MMOL/L (ref 20–33)
CREAT SERPL-MCNC: 0.84 MG/DL (ref 0.5–1.4)
ERYTHROCYTE [DISTWIDTH] IN BLOOD BY AUTOMATED COUNT: 40.6 FL (ref 35.9–50)
GLUCOSE BLD-MCNC: 204 MG/DL (ref 65–99)
GLUCOSE BLD-MCNC: 83 MG/DL (ref 65–99)
GLUCOSE SERPL-MCNC: 109 MG/DL (ref 65–99)
HCT VFR BLD AUTO: 32.3 % (ref 37–47)
HGB BLD-MCNC: 10.5 G/DL (ref 12–16)
MCH RBC QN AUTO: 28 PG (ref 27–33)
MCHC RBC AUTO-ENTMCNC: 32.5 G/DL (ref 33.6–35)
MCV RBC AUTO: 86.1 FL (ref 81.4–97.8)
PLATELET # BLD AUTO: 269 K/UL (ref 164–446)
PMV BLD AUTO: 9.6 FL (ref 9–12.9)
POTASSIUM SERPL-SCNC: 3.4 MMOL/L (ref 3.6–5.5)
RBC # BLD AUTO: 3.75 M/UL (ref 4.2–5.4)
SODIUM SERPL-SCNC: 141 MMOL/L (ref 135–145)
WBC # BLD AUTO: 11.6 K/UL (ref 4.8–10.8)

## 2019-12-18 PROCEDURE — 700102 HCHG RX REV CODE 250 W/ 637 OVERRIDE(OP): Performed by: INTERNAL MEDICINE

## 2019-12-18 PROCEDURE — 99239 HOSP IP/OBS DSCHRG MGMT >30: CPT | Performed by: INTERNAL MEDICINE

## 2019-12-18 PROCEDURE — 80048 BASIC METABOLIC PNL TOTAL CA: CPT

## 2019-12-18 PROCEDURE — 700102 HCHG RX REV CODE 250 W/ 637 OVERRIDE(OP): Performed by: NURSE PRACTITIONER

## 2019-12-18 PROCEDURE — 85027 COMPLETE CBC AUTOMATED: CPT

## 2019-12-18 PROCEDURE — 700111 HCHG RX REV CODE 636 W/ 250 OVERRIDE (IP): Performed by: INTERNAL MEDICINE

## 2019-12-18 PROCEDURE — A9270 NON-COVERED ITEM OR SERVICE: HCPCS | Performed by: NURSE PRACTITIONER

## 2019-12-18 PROCEDURE — 82962 GLUCOSE BLOOD TEST: CPT | Mod: 91

## 2019-12-18 PROCEDURE — 700105 HCHG RX REV CODE 258: Performed by: PHYSICIAN ASSISTANT

## 2019-12-18 PROCEDURE — A9270 NON-COVERED ITEM OR SERVICE: HCPCS | Performed by: INTERNAL MEDICINE

## 2019-12-18 PROCEDURE — 700105 HCHG RX REV CODE 258: Performed by: INTERNAL MEDICINE

## 2019-12-18 PROCEDURE — 99232 SBSQ HOSP IP/OBS MODERATE 35: CPT | Performed by: INTERNAL MEDICINE

## 2019-12-18 RX ORDER — AMOXICILLIN AND CLAVULANATE POTASSIUM 875; 125 MG/1; MG/1
1 TABLET, FILM COATED ORAL 2 TIMES DAILY
Qty: 84 TAB | Refills: 0 | Status: SHIPPED | OUTPATIENT
Start: 2019-12-18 | End: 2020-01-29

## 2019-12-18 RX ORDER — POTASSIUM CHLORIDE 20 MEQ/1
40 TABLET, EXTENDED RELEASE ORAL ONCE
Status: COMPLETED | OUTPATIENT
Start: 2019-12-18 | End: 2019-12-18

## 2019-12-18 RX ADMIN — INSULIN HUMAN 20 UNITS: 100 INJECTION, SUSPENSION SUBCUTANEOUS at 08:35

## 2019-12-18 RX ADMIN — SODIUM CHLORIDE: 9 INJECTION, SOLUTION INTRAVENOUS at 06:34

## 2019-12-18 RX ADMIN — INSULIN LISPRO 4 UNITS: 100 INJECTION, SOLUTION INTRAVENOUS; SUBCUTANEOUS at 11:24

## 2019-12-18 RX ADMIN — ASPIRIN 81 MG: 81 TABLET, COATED ORAL at 06:35

## 2019-12-18 RX ADMIN — CEFTRIAXONE SODIUM 2 G: 2 INJECTION, POWDER, FOR SOLUTION INTRAMUSCULAR; INTRAVENOUS at 06:35

## 2019-12-18 RX ADMIN — POTASSIUM CHLORIDE 40 MEQ: 1500 TABLET, EXTENDED RELEASE ORAL at 08:34

## 2019-12-18 ASSESSMENT — ENCOUNTER SYMPTOMS
COUGH: 0
SHORTNESS OF BREATH: 0
MYALGIAS: 1
NAUSEA: 0
SENSORY CHANGE: 0
CHILLS: 0
SPEECH CHANGE: 0
DIZZINESS: 0
FEVER: 0
VOMITING: 0
HEADACHES: 0

## 2019-12-18 NOTE — DISCHARGE INSTRUCTIONS
Discharge Instructions     Discharged to home by car with relative. Discharged via walking, hospital escort: Yes.  Special equipment needed: Not Applicable    Be sure to schedule a follow-up appointment with your primary care doctor or any specialists as instructed.     Discharge Plan:   Smoking Cessation Offered: Patient Refused  Influenza Vaccine Indication: Patient Refuses    I understand that a diet low in cholesterol, fat, and sodium is recommended for good health. Unless I have been given specific instructions below for another diet, I accept this instruction as my diet prescription.   Other diet: regular     Special Instructions: None    · Is patient discharged on Warfarin / Coumadin?   No     Depression / Suicide Risk    As you are discharged from this Critical access hospital facility, it is important to learn how to keep safe from harming yourself.    Recognize the warning signs:  · Abrupt changes in personality, positive or negative- including increase in energy   · Giving away possessions  · Change in eating patterns- significant weight changes-  positive or negative  · Change in sleeping patterns- unable to sleep or sleeping all the time   · Unwillingness or inability to communicate  · Depression  · Unusual sadness, discouragement and loneliness  · Talk of wanting to die  · Neglect of personal appearance   · Rebelliousness- reckless behavior  · Withdrawal from people/activities they love  · Confusion- inability to concentrate     If you or a loved one observes any of these behaviors or has concerns about self-harm, here's what you can do:  · Talk about it- your feelings and reasons for harming yourself  · Remove any means that you might use to hurt yourself (examples: pills, rope, extension cords, firearm)  · Get professional help from the community (Mental Health, Substance Abuse, psychological counseling)  · Do not be alone:Call your Safe Contact- someone whom you trust who will be there for you.  · Call your  local CRISIS HOTLINE 651-6801 or 862-890-6243  · Call your local Children's Mobile Crisis Response Team [place] (938) 397-8054 or www.hipages.com.au  · Call the toll free National Suicide Prevention Hotlines   · National Suicide Prevention Lifeline 861-741-UWCS (5478)  · National [employer] Line Network 800-SUICIDE (338-9062)      Discharge Instructions per Jyotsna Ballard M.D.    Continue antibiotic for 6 more weeks.   Follow up with ID within 2 weeks     DIET: healthy, low carb     ACTIVITY: as tolerated     DIAGNOSIS: osteomyelitis     Return to ER if worsening pain, confusion, fever, shortness of breath

## 2019-12-18 NOTE — PROGRESS NOTES
Infectious Disease Progress Note    Author: Rosalva Lee M.D. Date & Time of service: 2019  11:57 AM    Chief Complaint:  Follow-up for right thigh abscess and right femur osteomyelitis    Interval History:  2019-no fevers.  WBC is 9.2.  Has pain in the thigh.  The cultures have come back positive for Klebsiella  2019- no fevers. No new issues. WBC 9.2 continues to complain of pain  12/15/2019-no fevers.  No new issues overnight.  No labs available.  Wants to go home on oral antibiotics   Afebrile, No CBC done today.  Pain is controlled.  Patient states that she will undergo surgery tomorrow.  Tolerating antibiotics without any issues.   afebrile WBC 11.6 she is status post I&D down to bone with placement of an antibiotic delivery device in the right femur on .  Anxious to go home      Labs Reviewed and Medications Reviewed.      Review of Systems:  Review of Systems   Constitutional: Negative for chills and fever.   HENT: Negative for hearing loss.    Respiratory: Negative for cough and shortness of breath.    Cardiovascular: Negative for chest pain and leg swelling.   Gastrointestinal: Negative for nausea and vomiting.   Genitourinary: Negative for dysuria.   Musculoskeletal: Positive for myalgias.   Neurological: Negative for dizziness, sensory change, speech change and headaches.   All other systems reviewed and are negative.      Hemodynamics:  Temp (24hrs), Av.4 °C (97.6 °F), Min:36.1 °C (97 °F), Max:36.9 °C (98.4 °F)  Temperature: 36.1 °C (97 °F)  Pulse  Av.5  Min: 53  Max: 119   Blood Pressure: 134/89       Physical Exam:  Physical Exam  Vitals signs reviewed.   Constitutional:       Appearance: Normal appearance. She is normal weight.   HENT:      Head: Normocephalic and atraumatic.      Right Ear: External ear normal.      Left Ear: External ear normal.      Nose: Nose normal. No congestion.      Mouth/Throat:      Mouth: Mucous membranes are moist.       Pharynx: No oropharyngeal exudate.   Eyes:      General: No scleral icterus.     Extraocular Movements: Extraocular movements intact.      Pupils: Pupils are equal, round, and reactive to light.   Neck:      Musculoskeletal: Neck supple.   Cardiovascular:      Rate and Rhythm: Normal rate and regular rhythm.      Heart sounds: Normal heart sounds. No murmur.   Pulmonary:      Effort: Pulmonary effort is normal. No respiratory distress.      Breath sounds: No wheezing.   Abdominal:      General: Bowel sounds are normal. There is no distension.      Palpations: Abdomen is soft.      Tenderness: There is no tenderness. There is no guarding.   Musculoskeletal:         General: Tenderness present. No swelling or deformity.      Comments:  Right lower extremity surgical site with staples in place.  Well approximated without any active drainage or surrounding erythema.    Right upper extremity PICC line-nontender, no surrounding erythema   Lymphadenopathy:      Cervical: No cervical adenopathy.   Skin:     General: Skin is warm and dry.      Findings: No erythema.   Neurological:      General: No focal deficit present.      Mental Status: She is alert and oriented to person, place, and time.      Cranial Nerves: No cranial nerve deficit.   Psychiatric:         Mood and Affect: Mood normal.         Behavior: Behavior normal.      Comments: Pleasant         Meds:    Current Facility-Administered Medications:   •  acetaminophen  •  NS  •  aspirin EC  •  ketorolac  •  cefTRIAXone (ROCEPHIN) IV  •  Notify provider if pain remains uncontrolled **AND** Use the numeric rating scale (NRS-11) on regular floors and Critical-Care Pain Observation Tool (CPOT) on ICUs/Trauma to assess pain **AND** Pulse Ox (Oximetry) **AND** Pharmacy Consult Request **AND** If patient difficult to arouse and/or has respiratory depression, stop any opiates that are currently infusing and call a Rapid Response. **AND** [DISCONTINUED] oxyCODONE  immediate-release **AND** [DISCONTINUED] oxyCODONE immediate-release **AND** morphine injection  •  cyclobenzaprine  •  oxyCODONE immediate-release **OR** oxyCODONE immediate-release  •  cloNIDine  •  ondansetron  •  ondansetron  •  promethazine  •  promethazine  •  prochlorperazine  •  senna-docusate **AND** polyethylene glycol/lytes **AND** magnesium hydroxide **AND** bisacodyl  •  LR  •  [DISCONTINUED] insulin regular **AND** Accu-Chek Q6 if NPO **AND** NOTIFY MD and PharmD **AND** glucose **AND** dextrose 10% bolus  •  insulin lispro **AND** Accu-Chek ACHS **AND** NOTIFY MD and PharmD **AND** glucose **AND** dextrose 10% bolus  •  insulin NPH    Labs:  Recent Labs     12/16/19 2350 12/18/19  0631   WBC 6.6 11.6*   RBC 4.16* 3.75*   HEMOGLOBIN 11.8* 10.5*   HEMATOCRIT 36.1* 32.3*   MCV 86.8 86.1   MCH 28.4 28.0   RDW 40.4 40.6   PLATELETCT 394 269   MPV 9.4 9.6   NEUTSPOLYS 56.80  --    LYMPHOCYTES 34.50  --    MONOCYTES 6.20  --    EOSINOPHILS 1.40  --    BASOPHILS 0.30  --      Recent Labs     12/16/19  2350 12/18/19  0620   SODIUM 141 141   POTASSIUM 4.1 3.4*   CHLORIDE 108 108   CO2 24 23   GLUCOSE 171* 109*   BUN 15 14     Recent Labs     12/16/19  2350 12/18/19  0620   CREATININE 0.70 0.84       Imaging:  Ct-extremity, Lower With Right    Result Date: 12/11/2019 12/11/2019 12:15 AM HISTORY/REASON FOR EXAM:  Right thigh swelling. Prior osteomyelitis.. TECHNIQUE/EXAM DESCRIPTION AND NUMBER OF VIEWS:  CT scan of the RIGHT lower extremity with contrast, with reconstructions. Thin helical 3 mm sections were obtained from the distal femur through the proximal tibia/fibula. Sagittal and coronal multiplanar reconstructions were generated from the axial images. A total of 100 mL of Omnipaque 350 nonionic contrast was administered  IV without complication. Up to date radiation dose reduction adjustments have been utilized to meet ALARA standards for radiation dose reduction. COMPARISON: None. FINDINGS: There is  periosteal reaction with cortical irregularity and cortical defect involving the distal femoral diaphysis posterolaterally. There are linear lucent areas extending through the cortex into the medullary cavity in those areas possibly representing  prior bone biopsy or tracks from prior fixation hardware. There is some mottled bone density involving the intramedullary area extending proximal and distal to that area. Within the overlying soft tissues in that area, there is a large multiloculated rim-enhancing fluid collection which extends into the vastus lateralis and vastus intermedius muscles. This largest component of this multiloculated fluid collection is located posterolaterally and measures 7 x 5 x 4 cm in size. There are other smaller components located anteriorly and posteriorly distal to the larger abscess. There are also small pockets of gas present within the distal posterior fluid collection. No significant knee or hip joint effusion.     1.  Cortical irregularity and breakthrough involving the distal femoral diaphysis posterolaterally with overlying periosteal reaction most likely representing osteomyelitis. This less likely represents a neoplastic process. There are linear lucent areas extending into that region possibly representing prior biopsy versus tracks from prior fixation hardware. 2.  Multiloculated rim-enhancing fluid collections within the soft tissues of the thigh in the area of the above-described abnormality. The largest collection is located intramuscular within the vastus lateralis muscle and measures 7 x 5 x 4 cm in size. Other components extend into the vastus intermedialis muscle. Some of these also contain gas and most likely represent abscesses.      Micro:  Results     Procedure Component Value Units Date/Time    CULTURE TISSUE W/ GRM STAIN [[id number]]  (Abnormal) Collected:  12/11/19 1002    Order Status:  Completed Specimen:  Tissue Updated:  12/18/19 0910     Significant  Indicator POS     Source TISS     Site right thigh tissue     Culture Result -     Gram Stain Result Many WBCs.  No organisms seen.       Culture Result Klebsiella pneumoniae  Moderate growth  See previous culture for sensitivity report.      Narrative:       Surgery Specimen    Anaerobic Culture [[id number]] Collected:  12/11/19 1002    Order Status:  Completed Specimen:  Tissue Updated:  12/18/19 0910     Significant Indicator NEG     Source TISS     Site right thigh tissue     Culture Result No Anaerobes isolated.    Narrative:       Surgery Specimen    AFB Culture [[id number]] Collected:  12/11/19 1002    Order Status:  Completed Specimen:  Tissue Updated:  12/18/19 0910     Significant Indicator NEG     Source TISS     Site right thigh tissue     Culture Result Culture in progress.     AFB Smear Results No acid fast bacilli seen.    Narrative:       Surgery Specimen    Fungal Culture [[id number]] Collected:  12/11/19 1002    Order Status:  Completed Specimen:  Tissue Updated:  12/18/19 0910     Significant Indicator NEG     Source TISS     Site right thigh tissue     Culture Result No fungal growth To date.    Narrative:       Surgery Specimen    FLUID CULTURE W/GRAM STAIN [[id number]]  (Abnormal) Collected:  12/11/19 0959    Order Status:  Completed Specimen:  Body Fluid Updated:  12/18/19 0910     Significant Indicator POS     Source BF     Site Right thigh abscess     Culture Result -     Gram Stain Result Many WBCs.  Moderate Gram negative rods.       Culture Result Klebsiella pneumoniae  Heavy growth  See previous culture for sensitivity report.      Narrative:       Surgery Specimen    Anaerobic Culture [[id number]] Collected:  12/11/19 0959    Order Status:  Completed Specimen:  Body Fluid Updated:  12/18/19 0910     Significant Indicator NEG     Source BF     Site Right thigh abscess     Culture Result No Anaerobes isolated.    Narrative:       Surgery Specimen    AFB Culture [[id number]] Collected:  12/11/19  0959    Order Status:  Completed Specimen:  Body Fluid Updated:  12/18/19 0910     Significant Indicator NEG     Source BF     Site Right thigh abscess     Culture Result Culture in progress.     AFB Smear Results No acid fast bacilli seen.    Narrative:       Surgery Specimen    Fungal Culture [[id number]] Collected:  12/11/19 0959    Order Status:  Completed Specimen:  Body Fluid Updated:  12/18/19 0910     Significant Indicator NEG     Source BF     Site Right thigh abscess     Culture Result No fungal growth To date.    Narrative:       Surgery Specimen    Fungal Culture [[id number]] Collected:  12/11/19 1002    Order Status:  Completed Specimen:  Tissue Updated:  12/18/19 0910     Significant Indicator NEG     Source TISS     Site Right thigh periosteum     Culture Result No fungal growth To date.    Narrative:       Surgery Specimen    CULTURE TISSUE W/ GRM STAIN [[id number]]  (Abnormal) Collected:  12/11/19 1002    Order Status:  Completed Specimen:  Bone Updated:  12/18/19 0910     Significant Indicator POS     Source BONE     Site right thigh bone     Culture Result -     Gram Stain Result Rare WBCs.  No organisms seen.       Culture Result Klebsiella pneumoniae  Light growth  See previous culture for sensitivity report.      Narrative:       Surgery Specimen    Anaerobic Culture [[id number]] Collected:  12/11/19 1002    Order Status:  Completed Specimen:  Bone Updated:  12/18/19 0910     Significant Indicator NEG     Source BONE     Site right thigh bone     Culture Result No Anaerobes isolated.    Narrative:       Surgery Specimen    Fungal Culture [[id number]] Collected:  12/11/19 1002    Order Status:  Completed Specimen:  Bone Updated:  12/18/19 0910     Significant Indicator NEG     Source BONE     Site right thigh bone     Culture Result No fungal growth To date.    Narrative:       Surgery Specimen    AFB Culture [[id number]] Collected:  12/11/19 1002    Order Status:  Completed Specimen:  Bone Updated:   12/18/19 0910     Significant Indicator NEG     Source BONE     Site right thigh bone     Culture Result Culture in progress.     AFB Smear Results No acid fast bacilli seen.    Narrative:       Surgery Specimen    CULTURE TISSUE W/ GRM STAIN [[id number]]  (Abnormal)  (Susceptibility) Collected:  12/11/19 1002    Order Status:  Completed Specimen:  Tissue Updated:  12/18/19 0910     Significant Indicator POS     Source TISS     Site Right thigh periosteum     Culture Result -     Gram Stain Result Many WBCs.  Rare Gram negative rods.       Culture Result Klebsiella pneumoniae  Moderate growth      Narrative:       Surgery Specimen    Susceptibility     Klebsiella pneumoniae (1)     Antibiotic Interpretation Microscan Method Status    Ampicillin Resistant >16 mcg/mL TONY Final    Ceftriaxone Sensitive <=1 mcg/mL TONY Final    Ceftazidime Sensitive <=1 mcg/mL TONY Final    Cefotaxime Sensitive <=2 mcg/mL TONY Final    Cefazolin Sensitive <=2 mcg/mL TONY Final    Ciprofloxacin Sensitive <=1 mcg/mL TONY Final    Ampicillin/sulbactam Sensitive <=8/4 mcg/mL TONY Final    Cefepime Sensitive <=2 mcg/mL TONY Final    Tobramycin Sensitive <=4 mcg/mL TONY Final    Cefotetan Sensitive <=16 mcg/mL TONY Final    Ertapenem Sensitive <=0.5 mcg/mL TONY Final    Gentamicin Sensitive <=4 mcg/mL TONY Final    Pip/Tazobactam Sensitive <=16 mcg/mL TONY Final    Trimeth/Sulfa Sensitive <=2/38 mcg/mL TONY Final                   Anaerobic Culture [[id number]] Collected:  12/11/19 1002    Order Status:  Completed Specimen:  Tissue Updated:  12/18/19 0910     Significant Indicator NEG     Source TISS     Site Right thigh periosteum     Culture Result No Anaerobes isolated.    Narrative:       Surgery Specimen    AFB Culture [[id number]] Collected:  12/11/19 1002    Order Status:  Completed Specimen:  Tissue Updated:  12/18/19 0910     Significant Indicator NEG     Source TISS     Site Right thigh periosteum     Culture Result Culture in progress.     AFB  "Smear Results No acid fast bacilli seen.    Narrative:       Surgery Specimen    BLOOD CULTURE x2 [[id number]] Collected:  12/11/19 0121    Order Status:  Completed Specimen:  Blood from Peripheral Updated:  12/16/19 0500     Significant Indicator NEG     Source BLD     Site PERIPHERAL     Culture Result No growth after 5 days of incubation.    Narrative:       Per Hospital Policy: Only change Specimen Src: to \"Line\" if  specified by physician order.  No site indicated    BLOOD CULTURE x2 [[id number]] Collected:  12/11/19 0121    Order Status:  Completed Specimen:  Blood from Peripheral Updated:  12/16/19 0500     Significant Indicator NEG     Source BLD     Site PERIPHERAL     Culture Result No growth after 5 days of incubation.    Narrative:       Per Hospital Policy: Only change Specimen Src: to \"Line\" if  specified by physician order.  No site indicated    Acid Fast Stain [[id number]] Collected:  12/11/19 1002    Order Status:  Completed Specimen:  Tissue Updated:  12/11/19 1847     Significant Indicator NEG     Source TISS     Site Right thigh periosteum     AFB Smear Results No acid fast bacilli seen.    Narrative:       Surgery Specimen    GRAM STAIN [[id number]] Collected:  12/11/19 1002    Order Status:  Completed Specimen:  Bone Updated:  12/11/19 1847     Significant Indicator .     Source BONE     Site right thigh bone     Gram Stain Result Rare WBCs.  No organisms seen.      Narrative:       Surgery Specimen    GRAM STAIN [[id number]] Collected:  12/11/19 1002    Order Status:  Completed Specimen:  Tissue Updated:  12/11/19 1847     Significant Indicator .     Source TISS     Site Right thigh periosteum     Gram Stain Result Many WBCs.  Rare Gram negative rods.      Narrative:       Surgery Specimen    Acid Fast Stain [[id number]] Collected:  12/11/19 0959    Order Status:  Completed Specimen:  Body Fluid Updated:  12/11/19 1847     Significant Indicator NEG     Source BF     Site Right thigh abscess     AFB " Smear Results No acid fast bacilli seen.    Narrative:       Surgery Specimen    Acid Fast Stain [[id number]] Collected:  12/11/19 1002    Order Status:  Completed Specimen:  Bone Updated:  12/11/19 1847     Significant Indicator NEG     Source BONE     Site right thigh bone     AFB Smear Results No acid fast bacilli seen.    Narrative:       Surgery Specimen    GRAM STAIN [[id number]] Collected:  12/11/19 0959    Order Status:  Completed Specimen:  Body Fluid Updated:  12/11/19 1847     Significant Indicator .     Source BF     Site Right thigh abscess     Gram Stain Result Many WBCs.  Moderate Gram negative rods.      Narrative:       Surgery Specimen    Acid Fast Stain [[id number]] Collected:  12/11/19 1002    Order Status:  Completed Specimen:  Tissue Updated:  12/11/19 1846     Significant Indicator NEG     Source TISS     Site right thigh tissue     AFB Smear Results No acid fast bacilli seen.    Narrative:       Surgery Specimen    GRAM STAIN [[id number]] Collected:  12/11/19 1002    Order Status:  Completed Specimen:  Tissue Updated:  12/11/19 1846     Significant Indicator .     Source TISS     Site right thigh tissue     Gram Stain Result Many WBCs.  No organisms seen.      Narrative:       Surgery Specimen          Assessment:  Active Hospital Problems    Diagnosis   • *Acute hematogenous osteomyelitis of right femur ([facility]) [M86.051]   • Type 2 diabetes mellitus with hyperglycemia, with long-term current use of insulin ([facility]) [E11.65, Z79.4]   • Sepsis without acute organ dysfunction ([facility]) [A41.9]   Right thigh abscess    Plan:  Right femur osteomyelitis, on treatment  Etiology likely acute hematogenous  Was recently treated in [place] in September for 4 weeks in a SNF  Wound cultures + Klebsiella pneumoniae  Sed rate is 76 and CRP is 22.58  S/p saucerization of R femur on 12/11/19 by Dr. Rubin. Infected bone noted per OP note  Status post I&D down to bone with Dr. Rubin on 12/17/2019.  No purulence was  encountered per the operative note  Continue ceftriaxone 2 g daily inpatient  Plan for 6 weeks from 12/11  Stop date 01/22/20  Transition to p.o. Augmentin 875 mg twice daily to complete her 6-week course of antibiotics    Right thigh abscess, on treatment  Underwent I&D on 12/11/2019  Cultures +Klebsiella  Abx per above    Type 2 Diabetes mellitus, uncontrolled  hemoglobin A1c of 10.3  Will adversely affect resolution of infection  Blood sugar control    Okay to discharge patient from ID standpoint.    Follow-up in ID clinic 2 weeks post discharge    Plan of care discussed with hospitalist Dr. Ballard and Kacie, bedside RN.  Will sign off.

## 2019-12-18 NOTE — THERAPY
Occupational Therapy Contact Note    Met with patient, she was fully dressed and is awaiting d/c orders home. She reports no difficulty with mobility or ADLs. No further acute OT needs.    Viri Dickinson, OTR/L

## 2019-12-18 NOTE — THERAPY
Physical Therapy Contact Note    PT re-order received. Pt reports no needs, is independent with all mobility. Reports no concerns for DC home. PT will sign off.

## 2019-12-18 NOTE — PROGRESS NOTES
"S:  Seen and examined.  POD #1 s/p repeat R thigh I&D and ABX bead placement.  Doing well this morning.  No new complaints, pain controlled.    O: /89   Pulse 68   Temp 36.1 °C (97 °F) (Temporal)   Resp 17   Ht 1.626 m (5' 4\")   Wt 63 kg (138 lb 14.2 oz)   SpO2 100% .  No intake or output data in the 24 hours ending 12/18/19 1130.    Operative/injured extremity examined.  Compartments soft, distal light touch sensation intact, firing EHL/TA/GS/P.  Toes warm, well-perfused.  Wound dressing c/d/i    Recent Labs     12/16/19  2350 12/18/19  0631   WBC 6.6 11.6*   RBC 4.16* 3.75*   HEMOGLOBIN 11.8* 10.5*   HEMATOCRIT 36.1* 32.3*   MCV 86.8 86.1   MCH 28.4 28.0   MCHC 32.7* 32.5*   RDW 40.4 40.6   PLATELETCT 394 269   MPV 9.4 9.6       A/P:    POD #1 s/p repeat debridement of right thigh abscess and osteomyelitis    Antibiotics: Per ID  Activity: WBAT operative extremity.  PT today.  Diet: General  DVT: Mechanical (SCDs) + Pharmacologic (Lovenox in hospital, none or per medicine upon d/c if ambulatory)  Dispo: D/C planning    "

## 2019-12-18 NOTE — DISCHARGE SUMMARY
Discharge Summary    CHIEF COMPLAINT ON ADMISSION  Chief Complaint   Patient presents with   • Leg Pain       Reason for Admission  Post Op Pain     Admission Date  12/10/2019    CODE STATUS  Full Code    HPI & HOSPITAL COURSE  This is a 39 y.o. female past medical history of type 2 diabetes and recent treatment of right thigh osteomyelitis in [place] was admitted with right leg pain.  Emergency room she was found to have osteomyelitis and abscess of CT scan.  Orthopedic team was consulted and she underwent I&D 12/11/2019.  Infectious disease was consulted.  Culture tissue showed Klebsiella pneumonia.  On admission she was started on ceftriaxone and was transitioned to Augmentin 875 mg twice daily for 6 weeks complete course of antibiotic.  Patient was eager to go home.  She was advised to close follow-up with infectious disease and orthopedic team.  Therefore, she is discharged in guarded and stable condition to home with close outpatient follow-up.    The patient met 2-midnight criteria for an inpatient stay at the time of discharge.    Discharge Date  12/18/2019     FOLLOW UP ITEMS POST DISCHARGE  None     DISCHARGE DIAGNOSES  Principal Problem:    Acute hematogenous osteomyelitis of right femur (HCC) POA: Yes  Active Problems:    Type 2 diabetes mellitus with hyperglycemia, with long-term current use of insulin (HCC) POA: Yes  Resolved Problems:    Sepsis without acute organ dysfunction (HCC) POA: Yes    Dehydration POA: Yes      FOLLOW UP  No future appointments.  Shamar Rubin M.D.  [address]  [facility] [address]  [phone]          Rosalva Lee M.D.  [address]  [facility] [address]  [phone]    In 4 weeks        MEDICATIONS ON DISCHARGE     Medication List      START taking these medications      Instructions   amoxicillin-clavulanate 875-125 MG Tabs  Commonly known as:  AUGMENTIN   Take 1 Tab by mouth 2 times a day for 42 days.  Dose:  1 Tab        CONTINUE taking these  medications      Instructions   insulin  UNIT/ML Susp  Commonly known as:  HUMULIN/NOVOLIN   Inject 10 Units as instructed 2 Times a Day.  Dose:  10 Units            Allergies  Allergies   Allergen Reactions   • Bloodless        DIET  Orders Placed This Encounter   Procedures   • Diet Order Diabetic     Standing Status:   Standing     Number of Occurrences:   1     Order Specific Question:   Diet:     Answer:   Diabetic [3]       ACTIVITY  As tolerated.  Weight bearing as tolerated    CONSULTATIONS  Ortho  ID     PROCEDURES  I&D     LABORATORY  Lab Results   Component Value Date    SODIUM 141 12/18/2019    POTASSIUM 3.4 (L) 12/18/2019    CHLORIDE 108 12/18/2019    CO2 23 12/18/2019    GLUCOSE 109 (H) 12/18/2019    BUN 14 12/18/2019    CREATININE 0.84 12/18/2019        Lab Results   Component Value Date    WBC 11.6 (H) 12/18/2019    HEMOGLOBIN 10.5 (L) 12/18/2019    HEMATOCRIT 32.3 (L) 12/18/2019    PLATELETCT 269 12/18/2019        Total time of the discharge process exceeds 35 minutes.

## 2019-12-18 NOTE — PROGRESS NOTES
12/18 RITIKA Richey met pt at bedside with davian Lucas. Pt was ready to leave. Pharmacist gave pt meds and explained to her how to take them. CHW gave pt CCM brochure and left contact information and will follow up tomorrow 12/19.    12/20 RITIKA Richey spoke to pt on the phone to introduce CCM services. Pt is doing well after discharge and made follow uo appointments with Dr.Justin Rubin on 12/31 and Dr.Rachelle Lee on 1/5/2020. Pt needs a PCP and asked for different clinics she can call to schedule her appointments. CHW offered to make her an appointment, but pt would like to do her own research first. CHW will follow up with pt next week to see if she made her PCP appt. Pt states she has no transportation issues or issues paying for resources. Pt has a support system outside the hospital. Pt is living in an apartment with her . Pt does not need help from a nurse or pharmacist at the moment.     Plan: follow up with pt next week to make sure she has an establish PCP.     12/26 RITIKA Richey spoke to pt via telephone. Pt states she has a new PCP appointment [facility] in April. Pt states she will call again tomrorow to get a sooner appointment. Pt states she is doing well and does not need any CCM services right now, but CHW encouraged her to call CCM if she needs help in the future.     Outcome: Pt will be d/c from CCM, pt met all goals on 12/26.

## 2019-12-18 NOTE — CARE PLAN
Problem: Communication  Goal: The ability to communicate needs accurately and effectively will improve  Outcome: PROGRESSING AS EXPECTED     Problem: Safety  Goal: Will remain free from injury  Outcome: PROGRESSING AS EXPECTED     Problem: Bowel/Gastric:  Goal: Normal bowel function is maintained or improved  Outcome: PROGRESSING AS EXPECTED     Problem: Knowledge Deficit  Goal: Knowledge of disease process/condition, treatment plan, diagnostic tests, and medications will improve  Outcome: PROGRESSING AS EXPECTED     Problem: Pain Management  Goal: Pain level will decrease to patient's comfort goal  Outcome: PROGRESSING AS EXPECTED

## 2019-12-18 NOTE — DISCHARGE PLANNING
Medicaid Meds-to-Beds: Discharge prescription order listed below delivered to patient's bedside. RN notified. Patient counseled. Insurance will only cover 20 tablets per fill. Verified with Chasidy at [facility] that patient can  medication every 10 days with no limit on how many fills. Reference #[id number]. Fills greater than 20 tablets will require prior authorization. Discussed with patient. She states she is able to go to pharmacy every 10 days to have antibiotic filled.       Tonya Brohel   Home Medication Instructions DORETHA:[id number]    Printed on:12/18/19 1448   Medication Information                      amoxicillin-clavulanate (AUGMENTIN) 875-125 MG Tab  Take 1 Tab by mouth 2 times a day for 42 days.               Shayy Martin, PharmD

## 2019-12-19 ENCOUNTER — PATIENT OUTREACH (OUTPATIENT)
Dept: HEALTH INFORMATION MANAGEMENT | Facility: OTHER | Age: 39
End: 2019-12-19

## 2019-12-20 SDOH — ECONOMIC STABILITY: TRANSPORTATION INSECURITY
IN THE PAST 12 MONTHS, HAS THE LACK OF TRANSPORTATION KEPT YOU FROM MEDICAL APPOINTMENTS OR FROM GETTING MEDICATIONS?: NO

## 2019-12-20 SDOH — ECONOMIC STABILITY: INCOME INSECURITY: HOW HARD IS IT FOR YOU TO PAY FOR THE VERY BASICS LIKE FOOD, HOUSING, MEDICAL CARE, AND HEATING?: NOT HARD AT ALL

## 2019-12-20 SDOH — ECONOMIC STABILITY: FOOD INSECURITY: WITHIN THE PAST 12 MONTHS, YOU WORRIED THAT YOUR FOOD WOULD RUN OUT BEFORE YOU GOT MONEY TO BUY MORE.: NEVER TRUE

## 2019-12-20 SDOH — ECONOMIC STABILITY: FOOD INSECURITY: WITHIN THE PAST 12 MONTHS, THE FOOD YOU BOUGHT JUST DIDN'T LAST AND YOU DIDN'T HAVE MONEY TO GET MORE.: NEVER TRUE

## 2019-12-20 SDOH — ECONOMIC STABILITY: TRANSPORTATION INSECURITY
IN THE PAST 12 MONTHS, HAS LACK OF TRANSPORTATION KEPT YOU FROM MEETINGS, WORK, OR FROM GETTING THINGS NEEDED FOR DAILY LIVING?: NO

## 2020-01-07 ENCOUNTER — TELEPHONE (OUTPATIENT)
Dept: INFECTIOUS DISEASES | Facility: MEDICAL CENTER | Age: 40
End: 2020-01-07

## 2020-01-07 PROBLEM — M86.051: Chronic | Status: ACTIVE | Noted: 2019-12-11

## 2020-01-08 LAB
FUNGUS SPEC CULT: NORMAL
SIGNIFICANT IND 70042: NORMAL
SITE SITE: NORMAL
SOURCE SOURCE: NORMAL

## 2020-01-08 NOTE — DOCUMENTATION QUERY
"                                                                         [facility]                                                                       Query Response Note      PATIENT:               JASON DEAN  ACCT #:                  [id number]  MRN:                     [id number]  :                      1980  ADMIT DATE:       12/10/2019 9:41 PM  DISCH DATE:        2019 4:21 PM  RESPONDING  PROVIDER #:        [id number]           QUERY TEXT:    Debridement is documented in the Medical Record. Please specify the type.      NOTE:  If an appropriate response is not listed below, please respond with a new note.    The patient's Clinical Indicators include:  Indicators: Right femur Osteomyelitis  Treatment: OP Report  \"The bone was debrided with a curette and cleared of any unhealthy-appearing   tissue.\"  Debridement was documented in the list of Procedure performed however the detailed portion of the Operative Course is not clear.  Risk Factors: Excisional or Non-excisional debridement  Options provided:   -- Non-excisional debridement   -- Excisional debridement, Please specify the following details- Deepest level of debridement treated, nature of tissue, appearance/size of wound, and technique   -- Unable to determine      Query created by: Dionte Jose on 2020 11:51 AM    RESPONSE TEXT:    excisional debridement- This is all very clearly documented in the operative report when read carefully in its entirety you should have all these answers.          Electronically signed by:  KRISH AMADO MD 2020 8:54 AM              "

## 2020-02-05 LAB
MYCOBACTERIUM SPEC CULT: NORMAL
RHODAMINE-AURAMINE STN SPEC: NORMAL
SIGNIFICANT IND 70042: NORMAL
SITE SITE: NORMAL
SOURCE SOURCE: NORMAL

## 2020-11-20 ENCOUNTER — APPOINTMENT (OUTPATIENT)
Dept: RADIOLOGY | Facility: MEDICAL CENTER | Age: 40
End: 2020-11-20
Attending: EMERGENCY MEDICINE
Payer: COMMERCIAL

## 2020-11-20 ENCOUNTER — HOSPITAL ENCOUNTER (EMERGENCY)
Facility: MEDICAL CENTER | Age: 40
End: 2020-11-20
Attending: EMERGENCY MEDICINE
Payer: COMMERCIAL

## 2020-11-20 VITALS
HEIGHT: 64 IN | DIASTOLIC BLOOD PRESSURE: 68 MMHG | TEMPERATURE: 100 F | WEIGHT: 135 LBS | HEART RATE: 85 BPM | SYSTOLIC BLOOD PRESSURE: 101 MMHG | RESPIRATION RATE: 16 BRPM | OXYGEN SATURATION: 95 % | BODY MASS INDEX: 23.05 KG/M2

## 2020-11-20 DIAGNOSIS — B34.9 VIRAL SYNDROME: ICD-10-CM

## 2020-11-20 LAB
ALBUMIN SERPL BCP-MCNC: 4.3 G/DL (ref 3.2–4.9)
ALBUMIN/GLOB SERPL: 1 G/DL
ALP SERPL-CCNC: 83 U/L (ref 30–99)
ALT SERPL-CCNC: 15 U/L (ref 2–50)
ANION GAP SERPL CALC-SCNC: 10 MMOL/L (ref 7–16)
APPEARANCE UR: CLEAR
AST SERPL-CCNC: 23 U/L (ref 12–45)
BACTERIA #/AREA URNS HPF: NEGATIVE /HPF
BASOPHILS # BLD AUTO: 0.2 % (ref 0–1.8)
BASOPHILS # BLD: 0.01 K/UL (ref 0–0.12)
BILIRUB SERPL-MCNC: 0.4 MG/DL (ref 0.1–1.5)
BILIRUB UR QL STRIP.AUTO: NEGATIVE
BUN SERPL-MCNC: 16 MG/DL (ref 8–22)
CALCIUM SERPL-MCNC: 9.4 MG/DL (ref 8.5–10.5)
CHLORIDE SERPL-SCNC: 101 MMOL/L (ref 96–112)
CO2 SERPL-SCNC: 22 MMOL/L (ref 20–33)
COLOR UR: YELLOW
COVID ORDER STATUS COVID19: NORMAL
CREAT SERPL-MCNC: 0.74 MG/DL (ref 0.5–1.4)
EOSINOPHIL # BLD AUTO: 0 K/UL (ref 0–0.51)
EOSINOPHIL NFR BLD: 0 % (ref 0–6.9)
EPI CELLS #/AREA URNS HPF: ABNORMAL /HPF
ERYTHROCYTE [DISTWIDTH] IN BLOOD BY AUTOMATED COUNT: 37.9 FL (ref 35.9–50)
GLOBULIN SER CALC-MCNC: 4.2 G/DL (ref 1.9–3.5)
GLUCOSE SERPL-MCNC: 224 MG/DL (ref 65–99)
GLUCOSE UR STRIP.AUTO-MCNC: >=1000 MG/DL
HCT VFR BLD AUTO: 45.2 % (ref 37–47)
HGB BLD-MCNC: 15 G/DL (ref 12–16)
HYALINE CASTS #/AREA URNS LPF: ABNORMAL /LPF
IMM GRANULOCYTES # BLD AUTO: 0.02 K/UL (ref 0–0.11)
IMM GRANULOCYTES NFR BLD AUTO: 0.5 % (ref 0–0.9)
KETONES UR STRIP.AUTO-MCNC: ABNORMAL MG/DL
LACTATE BLD-SCNC: 1.5 MMOL/L (ref 0.5–2)
LEUKOCYTE ESTERASE UR QL STRIP.AUTO: NEGATIVE
LYMPHOCYTES # BLD AUTO: 1.13 K/UL (ref 1–4.8)
LYMPHOCYTES NFR BLD: 25.6 % (ref 22–41)
MCH RBC QN AUTO: 28.9 PG (ref 27–33)
MCHC RBC AUTO-ENTMCNC: 33.2 G/DL (ref 33.6–35)
MCV RBC AUTO: 87.1 FL (ref 81.4–97.8)
MICRO URNS: ABNORMAL
MONOCYTES # BLD AUTO: 0.32 K/UL (ref 0–0.85)
MONOCYTES NFR BLD AUTO: 7.3 % (ref 0–13.4)
NEUTROPHILS # BLD AUTO: 2.93 K/UL (ref 2–7.15)
NEUTROPHILS NFR BLD: 66.4 % (ref 44–72)
NITRITE UR QL STRIP.AUTO: NEGATIVE
NRBC # BLD AUTO: 0 K/UL
NRBC BLD-RTO: 0 /100 WBC
PH UR STRIP.AUTO: 5.5 [PH] (ref 5–8)
PLATELET # BLD AUTO: 218 K/UL (ref 164–446)
PMV BLD AUTO: 10.4 FL (ref 9–12.9)
POTASSIUM SERPL-SCNC: 3.8 MMOL/L (ref 3.6–5.5)
PROT SERPL-MCNC: 8.5 G/DL (ref 6–8.2)
PROT UR QL STRIP: 100 MG/DL
RBC # BLD AUTO: 5.19 M/UL (ref 4.2–5.4)
RBC # URNS HPF: ABNORMAL /HPF
RBC UR QL AUTO: ABNORMAL
SARS-COV-2 RNA RESP QL NAA+PROBE: DETECTED
SODIUM SERPL-SCNC: 133 MMOL/L (ref 135–145)
SP GR UR STRIP.AUTO: 1.02
SPECIMEN SOURCE: ABNORMAL
UROBILINOGEN UR STRIP.AUTO-MCNC: 0.2 MG/DL
WBC # BLD AUTO: 4.4 K/UL (ref 4.8–10.8)
WBC #/AREA URNS HPF: ABNORMAL /HPF

## 2020-11-20 PROCEDURE — 83605 ASSAY OF LACTIC ACID: CPT

## 2020-11-20 PROCEDURE — 85025 COMPLETE CBC W/AUTO DIFF WBC: CPT

## 2020-11-20 PROCEDURE — U0003 INFECTIOUS AGENT DETECTION BY NUCLEIC ACID (DNA OR RNA); SEVERE ACUTE RESPIRATORY SYNDROME CORONAVIRUS 2 (SARS-COV-2) (CORONAVIRUS DISEASE [COVID-19]), AMPLIFIED PROBE TECHNIQUE, MAKING USE OF HIGH THROUGHPUT TECHNOLOGIES AS DESCRIBED BY CMS-2020-01-R: HCPCS

## 2020-11-20 PROCEDURE — 700105 HCHG RX REV CODE 258: Performed by: EMERGENCY MEDICINE

## 2020-11-20 PROCEDURE — 700102 HCHG RX REV CODE 250 W/ 637 OVERRIDE(OP): Performed by: EMERGENCY MEDICINE

## 2020-11-20 PROCEDURE — 81001 URINALYSIS AUTO W/SCOPE: CPT

## 2020-11-20 PROCEDURE — A9270 NON-COVERED ITEM OR SERVICE: HCPCS | Performed by: EMERGENCY MEDICINE

## 2020-11-20 PROCEDURE — 80053 COMPREHEN METABOLIC PANEL: CPT

## 2020-11-20 PROCEDURE — 71045 X-RAY EXAM CHEST 1 VIEW: CPT

## 2020-11-20 PROCEDURE — 99284 EMERGENCY DEPT VISIT MOD MDM: CPT

## 2020-11-20 PROCEDURE — 36415 COLL VENOUS BLD VENIPUNCTURE: CPT

## 2020-11-20 RX ORDER — SODIUM CHLORIDE 9 MG/ML
1000 INJECTION, SOLUTION INTRAVENOUS ONCE
Status: COMPLETED | OUTPATIENT
Start: 2020-11-20 | End: 2020-11-20

## 2020-11-20 RX ORDER — ACETAMINOPHEN 325 MG/1
650 TABLET ORAL ONCE
Status: COMPLETED | OUTPATIENT
Start: 2020-11-20 | End: 2020-11-20

## 2020-11-20 RX ADMIN — ACETAMINOPHEN 650 MG: 325 TABLET, FILM COATED ORAL at 16:45

## 2020-11-20 RX ADMIN — SODIUM CHLORIDE 1000 ML: 9 INJECTION, SOLUTION INTRAVENOUS at 16:45

## 2020-11-20 ASSESSMENT — LIFESTYLE VARIABLES: DO YOU DRINK ALCOHOL: NO

## 2020-11-20 ASSESSMENT — FIBROSIS 4 INDEX: FIB4 SCORE: 0.47

## 2020-11-21 NOTE — ED NOTES
Pt discharged home per provider in stable condition. Paperwork provided to pt via RN and discharge instructions went over with by RN - pt verbalized understanding of teaching with no questions or concerns and is A/Ox4, VSS. Paperwork in hand on discharge.    Paperwork given too and gone over with by RN - pt verbalized understanding of teaching with no questions or concerns and is ambulatory out with paperwork in hand. All belongings on discharge in possession of patient. A/Ox4.

## 2020-11-21 NOTE — ED PROVIDER NOTES
CHIEF COMPLAINT  Chief Complaint   Patient presents with   • Cough     sx for couple days   • Fever   • Headache       HPI  Tonya Bro is a 40 y.o. female who presents with cough fever chills body aches headache and burning eyes over the past 7 days.  The patient does have a history of diabetes as well as a history of sepsis from Klebsiella, presumably from a urinary source.  She denies any burning with urination today.  She does not feel particularly short of breath or have any chest pain.  She does not know of any COVID-19 exposures.  She apparently does not have the strips to test for her blood sugar at home so does not know what that has been.  No abdominal pain.  Her headache seems to be mild and throbbing-she has no neck stiffness.  She has no photophobia.    REVIEW OF SYSTEMS  All other systems are negative.     PAST MEDICAL HISTORY  Past Medical History:   Diagnosis Date   • Diabetes (HCC)    • Vaginal yeast infection 11/26/2018       FAMILY HISTORY  No family history on file.    SOCIAL HISTORY  Social History     Socioeconomic History   • Marital status:      Spouse name: Not on file   • Number of children: Not on file   • Years of education: Not on file   • Highest education level: Not on file   Occupational History   • Not on file   Social Needs   • Financial resource strain: Not hard at all   • Food insecurity     Worry: Never true     Inability: Never true   • Transportation needs     Medical: No     Non-medical: No   Tobacco Use   • Smoking status: Former Smoker   • Smokeless tobacco: Never Used   Substance and Sexual Activity   • Alcohol use: No   • Drug use: No   • Sexual activity: Not on file   Lifestyle   • Physical activity     Days per week: Not on file     Minutes per session: Not on file   • Stress: Not on file   Relationships   • Social connections     Talks on phone: Not on file     Gets together: Not on file     Attends Mandaeism service: Not on file     Active member  "of club or organization: Not on file     Attends meetings of clubs or organizations: Not on file     Relationship status: Not on file   • Intimate partner violence     Fear of current or ex partner: Not on file     Emotionally abused: Not on file     Physically abused: Not on file     Forced sexual activity: Not on file   Other Topics Concern   • Primary/coprimary nurse & associates Not Asked   • Family contact information Not Asked   • OK to release patient information to the following Not Asked   • Patient preferred routine/Privacy concerns Not Asked   • Patient likes and dislikes Not Asked   • Participating In Research Study Not Asked   • Miscellaneous Not Asked   Social History Narrative   • Not on file       SURGICAL HISTORY  Past Surgical History:   Procedure Laterality Date   • IRRIGATION & DEBRIDEMENT ORTHO Right 12/17/2019    Procedure: IRRIGATION AND DEBRIDEMENT, WOUND- THIGH, AND ANTIBIOTIC DELIVERY DEVICE PLACEMENT;  Surgeon: Shamar Rubin M.D.;  Location: SURGERY [facility];  Service: Orthopedics   • IRRIGATION & DEBRIDEMENT ORTHO Right 12/11/2019    Procedure: IRRIGATION AND DEBRIDEMENT, WOUND - THIGH;  Surgeon: Shamar Rubin M.D.;  Location: SURGERY [facility];  Service: Orthopedics       CURRENT MEDICATIONS  Home Medications    **Home medications have not yet been reviewed for this encounter**         ALLERGIES  Allergies   Allergen Reactions   • Bloodless        PHYSICAL EXAM  VITAL SIGNS: /86   Pulse (!) 111   Temp (!) 38.3 °C (100.9 °F) (Oral)   Resp 18   Ht 1.626 m (5' 4\")   Wt 61.2 kg (135 lb)   SpO2 94%   BMI 23.17 kg/m²      Constitutional: Well developed, Well nourished, No acute distress, Non-toxic appearance.   HENT: Normocephalic, Atraumatic, TMs normal, mucous membranes moist, no erythema, exudates, swelling, or masses, nares patent  Eyes: nonicteric  Neck: Supple, no meningismus  Lymphatic: No lymphadenopathy noted.   Cardiovascular: Tachycardic with regular " rhythm, no gallops rubs or murmurs  Lungs: Clear bilaterally   Abdomen: Soft and nontender throughout  Skin: Warm, Dry, no rash  Genitalia: Deferred  Rectal: Deferred  Extremities: No edema  Neurologic: Alert, appropriate, follows commands, moving all extremities, normal speech   Psychiatric: Affect normal    RADIOLOGY/PROCEDURES  DX-CHEST-PORTABLE (1 VIEW)   Final Result      1.  Ill-defined lower lobe parenchymal opacities are seen bilaterally. Imaging features can be seen with COVID-19 pneumonia, though are nonspecific and can occur with a variety of infectious and noninfectious processes.        Results for orders placed or performed during the hospital encounter of 11/20/20   CBC WITH DIFFERENTIAL   Result Value Ref Range    WBC 4.4 (L) 4.8 - 10.8 K/uL    RBC 5.19 4.20 - 5.40 M/uL    Hemoglobin 15.0 12.0 - 16.0 g/dL    Hematocrit 45.2 37.0 - 47.0 %    MCV 87.1 81.4 - 97.8 fL    MCH 28.9 27.0 - 33.0 pg    MCHC 33.2 (L) 33.6 - 35.0 g/dL    RDW 37.9 35.9 - 50.0 fL    Platelet Count 218 164 - 446 K/uL    MPV 10.4 9.0 - 12.9 fL    Neutrophils-Polys 66.40 44.00 - 72.00 %    Lymphocytes 25.60 22.00 - 41.00 %    Monocytes 7.30 0.00 - 13.40 %    Eosinophils 0.00 0.00 - 6.90 %    Basophils 0.20 0.00 - 1.80 %    Immature Granulocytes 0.50 0.00 - 0.90 %    Nucleated RBC 0.00 /100 WBC    Neutrophils (Absolute) 2.93 2.00 - 7.15 K/uL    Lymphs (Absolute) 1.13 1.00 - 4.80 K/uL    Monos (Absolute) 0.32 0.00 - 0.85 K/uL    Eos (Absolute) 0.00 0.00 - 0.51 K/uL    Baso (Absolute) 0.01 0.00 - 0.12 K/uL    Immature Granulocytes (abs) 0.02 0.00 - 0.11 K/uL    NRBC (Absolute) 0.00 K/uL   COMP METABOLIC PANEL   Result Value Ref Range    Sodium 133 (L) 135 - 145 mmol/L    Potassium 3.8 3.6 - 5.5 mmol/L    Chloride 101 96 - 112 mmol/L    Co2 22 20 - 33 mmol/L    Anion Gap 10.0 7.0 - 16.0    Glucose 224 (H) 65 - 99 mg/dL    Bun 16 8 - 22 mg/dL    Creatinine 0.74 0.50 - 1.40 mg/dL    Calcium 9.4 8.5 - 10.5 mg/dL    AST(SGOT) 23 12 - 45 U/L     ALT(SGPT) 15 2 - 50 U/L    Alkaline Phosphatase 83 30 - 99 U/L    Total Bilirubin 0.4 0.1 - 1.5 mg/dL    Albumin 4.3 3.2 - 4.9 g/dL    Total Protein 8.5 (H) 6.0 - 8.2 g/dL    Globulin 4.2 (H) 1.9 - 3.5 g/dL    A-G Ratio 1.0 g/dL   LACTIC ACID   Result Value Ref Range    Lactic Acid 1.5 0.5 - 2.0 mmol/L   URINALYSIS (UA)    Specimen: Urine   Result Value Ref Range    Color Yellow     Character Clear     Specific Gravity 1.025 <1.035    Ph 5.5 5.0 - 8.0    Glucose >=1000 (A) Negative mg/dL    Ketones Trace (A) Negative mg/dL    Protein 100 (A) Negative mg/dL    Bilirubin Negative Negative    Urobilinogen, Urine 0.2 Negative    Nitrite Negative Negative    Leukocyte Esterase Negative Negative    Occult Blood Moderate (A) Negative    Micro Urine Req Microscopic    COVID/SARS CoV-2 PCR    Specimen: Nasopharyngeal; Respirate   Result Value Ref Range    COVID Order Status Received    SARS-CoV-2, PCR (In-House)   Result Value Ref Range    SARS-CoV-2 Source NP Swab    URINE MICROSCOPIC (W/UA)   Result Value Ref Range    WBC 0-2 /hpf    RBC 0-2 /hpf    Bacteria Negative None /hpf    Epithelial Cells Few /hpf    Hyaline Cast 6-10 (A) /lpf   ESTIMATED GFR   Result Value Ref Range    GFR If African American >60 >60 mL/min/1.73 m 2    GFR If Non African American >60 >60 mL/min/1.73 m 2         COURSE & MEDICAL DECISION MAKING  Pertinent Labs & Imaging studies reviewed. (See chart for details)  This is a 40-year-old female who presents with appears to be a viral syndrome, likely COVID-19 infection.  She does have some patchy ill-defined densities in both lower lobes of her lungs which is very suspicious for COVID-19.  Her actual test is pending.  Otherwise white count is minimally low consistent with a viral process.  Patient's lactate is normal.  Urine is clean-there is some ketosis suggesting mild dehydration but bicarbonate level is normal.  Patient's glucose is 224.  The patient will be written for test strips at home if  she is run out of them.  Otherwise she will be treated supportively pending her COVID-19 test.  She will return for any trouble breathing or other concerns in the interim.  At this point I am not going to treat for bacterial pneumonia.    FINAL IMPRESSION  1.  Viral syndrome  2.   3.         Electronically signed by: Jaylen Alanis M.D., 11/20/2020 4:21 PM

## 2020-11-21 NOTE — ED NOTES
All labs completed and sent to lab at this time. Pt hooked up to fluids and is sitting up in tent. No further needs at this time.

## 2020-11-21 NOTE — ED TRIAGE NOTES
Chief Complaint   Patient presents with   • Cough     sx for couple days   • Fever   • Headache     Pt ambulated to tent with above complaints

## 2021-01-30 ENCOUNTER — HOSPITAL ENCOUNTER (OUTPATIENT)
Facility: MEDICAL CENTER | Age: 41
End: 2021-01-30
Attending: PHYSICIAN ASSISTANT
Payer: COMMERCIAL

## 2021-01-30 ENCOUNTER — OFFICE VISIT (OUTPATIENT)
Dept: URGENT CARE | Facility: CLINIC | Age: 41
End: 2021-01-30
Payer: COMMERCIAL

## 2021-01-30 VITALS
WEIGHT: 137.8 LBS | RESPIRATION RATE: 16 BRPM | TEMPERATURE: 97.3 F | BODY MASS INDEX: 23.52 KG/M2 | HEIGHT: 64 IN | DIASTOLIC BLOOD PRESSURE: 68 MMHG | SYSTOLIC BLOOD PRESSURE: 108 MMHG | HEART RATE: 78 BPM | OXYGEN SATURATION: 94 %

## 2021-01-30 DIAGNOSIS — N76.0 ACUTE VAGINITIS: ICD-10-CM

## 2021-01-30 DIAGNOSIS — E11.65 TYPE 2 DIABETES MELLITUS WITH HYPERGLYCEMIA, WITH LONG-TERM CURRENT USE OF INSULIN (HCC): ICD-10-CM

## 2021-01-30 DIAGNOSIS — R81 GLUCOSURIA: ICD-10-CM

## 2021-01-30 DIAGNOSIS — Z86.19 HISTORY OF CANDIDAL VULVOVAGINITIS: ICD-10-CM

## 2021-01-30 DIAGNOSIS — Z79.4 TYPE 2 DIABETES MELLITUS WITH HYPERGLYCEMIA, WITH LONG-TERM CURRENT USE OF INSULIN (HCC): ICD-10-CM

## 2021-01-30 LAB
APPEARANCE UR: CLEAR
BILIRUB UR STRIP-MCNC: NORMAL MG/DL
COLOR UR AUTO: YELLOW
GLUCOSE BLD-MCNC: 314 MG/DL (ref 70–100)
GLUCOSE UR STRIP.AUTO-MCNC: NORMAL MG/DL
KETONES UR STRIP.AUTO-MCNC: NORMAL MG/DL
LEUKOCYTE ESTERASE UR QL STRIP.AUTO: NEGATIVE
NITRITE UR QL STRIP.AUTO: NEGATIVE
PH UR STRIP.AUTO: 5 [PH] (ref 5–8)
PROT UR QL STRIP: NORMAL MG/DL
RBC UR QL AUTO: NEGATIVE
SP GR UR STRIP.AUTO: 1.01
UROBILINOGEN UR STRIP-MCNC: NORMAL MG/DL

## 2021-01-30 PROCEDURE — 87660 TRICHOMONAS VAGIN DIR PROBE: CPT

## 2021-01-30 PROCEDURE — 82962 GLUCOSE BLOOD TEST: CPT | Performed by: PHYSICIAN ASSISTANT

## 2021-01-30 PROCEDURE — 87510 GARDNER VAG DNA DIR PROBE: CPT

## 2021-01-30 PROCEDURE — 99214 OFFICE O/P EST MOD 30 MIN: CPT | Performed by: PHYSICIAN ASSISTANT

## 2021-01-30 PROCEDURE — 87480 CANDIDA DNA DIR PROBE: CPT

## 2021-01-30 PROCEDURE — 81002 URINALYSIS NONAUTO W/O SCOPE: CPT | Performed by: PHYSICIAN ASSISTANT

## 2021-01-30 RX ORDER — NYSTATIN AND TRIAMCINOLONE ACETONIDE 100000; 1 [USP'U]/G; MG/G
1 OINTMENT TOPICAL 2 TIMES DAILY
Qty: 60 G | Refills: 0 | Status: SHIPPED | OUTPATIENT
Start: 2021-01-30 | End: 2021-10-13

## 2021-01-30 RX ORDER — FLUCONAZOLE 150 MG/1
150 TABLET ORAL DAILY
Qty: 3 TAB | Refills: 0 | Status: SHIPPED | OUTPATIENT
Start: 2021-01-30 | End: 2021-02-01 | Stop reason: SDUPTHER

## 2021-01-30 ASSESSMENT — ENCOUNTER SYMPTOMS
NAUSEA: 0
FLANK PAIN: 0
ABDOMINAL PAIN: 0
FEVER: 0
CHILLS: 0
POLYDIPSIA: 0
VOMITING: 0

## 2021-01-30 ASSESSMENT — FIBROSIS 4 INDEX: FIB4 SCORE: 1.09

## 2021-01-30 NOTE — PROGRESS NOTES
Subjective:   Tonya Bro is a 40 y.o. female who presents for Vaginitis (itching, swelling, burning and bleeding x 2 wks)        This is an acute problem.  Ms. Bro is a pleasant 48-year-old female with history of uncontrolled type 2 diabetes.  Today she presents with vaginal discomfort.  This has been going on for 2 weeks and worsening.  She is experiencing itching, burning, swelling of her labia and copious white discharge.  States that current symptoms are consistent with prior yeast infections.  She does have history of BV, but states that this does not feel like BV to her.  States that she is prone to yeast infections.  Recently relocated to the area from [facility].  She has not yet established with [facility].  She is on insulin for her diabetes, but did not take it this morning.  No polyuria or polydipsia.    Review of Systems   Constitutional: Negative for chills and fever.   Gastrointestinal: Negative for abdominal pain, nausea and vomiting.   Genitourinary: Negative for dysuria, flank pain, frequency, hematuria and urgency.   Endo/Heme/Allergies: Negative for polydipsia.       PMH:  has a past medical history of Diabetes ([facility]) and Vaginal yeast infection (11/26/2018). She also has no past medical history of Anginal syndrome ([facility]), Arrhythmia, Arthritis, Asthma, At risk for sleep apnea, Back pain, Blood clotting disorder ([facility]), Bronchitis, Cancer ([facility]), Cataract, Congestive heart failure ([facility]), COPD (chronic obstructive pulmonary disease) ([facility]), Dialysis patient ([facility]), Disorder of thyroid, Fall, Glaucoma, Heart murmur, Heart valve disease, Hemorrhagic disorder ([facility]), Hypertension, Indigestion, Jaundice, Myocardial infarct ([facility]), Pacemaker, Pneumonia, Psychiatric problem, Renal disorder, Rheumatic fever, Seizure ([facility]), Stroke ([facility]), or Urinary incontinence.  MEDS:   Current Outpatient Medications:   •  fluconazole (DIFLUCAN) 150 MG tablet, Take 1 Tab by mouth every day for 3 days.,  "Disp: 3 Tab, Rfl: 0  •  nystatin-triamcinalone (MYCOLOG) 868652-7.1 UNIT/GM-% Ointment, Apply 1 Units topically 2 times a day., Disp: 60 g, Rfl: 0  •  insulin NPH (HUMULIN,NOVOLIN) 100 UNIT/ML Suspension, Inject 10 Units as instructed 2 Times a Day., Disp: 1 Vial, Rfl: 2  ALLERGIES:   Allergies   Allergen Reactions   • Bloodless      SURGHX:   Past Surgical History:   Procedure Laterality Date   • IRRIGATION & DEBRIDEMENT ORTHO Right 12/17/2019    Procedure: IRRIGATION AND DEBRIDEMENT, WOUND- THIGH, AND ANTIBIOTIC DELIVERY DEVICE PLACEMENT;  Surgeon: Shamar Rubin M.D.;  Location: SURGERY [facility];  Service: Orthopedics   • IRRIGATION & DEBRIDEMENT ORTHO Right 12/11/2019    Procedure: IRRIGATION AND DEBRIDEMENT, WOUND - THIGH;  Surgeon: Shamar Rubin M.D.;  Location: SURGERY [facility];  Service: Orthopedics     SOCHX:  reports that she has quit smoking. She has never used smokeless tobacco. She reports that she does not drink alcohol or use drugs.  FH: Family history was reviewed, no pertinent findings to report   Objective:   /68 (BP Location: Left arm, Patient Position: Sitting, BP Cuff Size: Adult)   Pulse 78   Temp 36.3 °C (97.3 °F) (Temporal)   Resp 16   Ht 1.626 m (5' 4\")   Wt 62.5 kg (137 lb 12.8 oz)   LMP 01/20/2021   SpO2 94%   BMI 23.65 kg/m²   Physical Exam  Vitals signs reviewed.   Constitutional:       General: She is not in acute distress.     Appearance: Normal appearance. She is well-developed. She is not toxic-appearing.   HENT:      Head: Normocephalic and atraumatic.      Right Ear: External ear normal.      Left Ear: External ear normal.      Nose: Nose normal.   Eyes:      General: Gaze aligned appropriately.   Neck:      Musculoskeletal: Neck supple.   Cardiovascular:      Rate and Rhythm: Normal rate and regular rhythm.   Pulmonary:      Effort: Pulmonary effort is normal. No respiratory distress.      Breath sounds: No stridor.   Skin:     General: Skin is " warm and dry.      Capillary Refill: Capillary refill takes less than 2 seconds.   Neurological:      Mental Status: She is alert and oriented to person, place, and time.      Comments: CN2-12 grossly intact   Psychiatric:         Speech: Speech normal.         Behavior: Behavior normal.           Assessment/Plan:   1. Acute vaginitis  - VAGINAL PATHOGENS DNA PANEL; Future  - fluconazole (DIFLUCAN) 150 MG tablet; Take 1 Tab by mouth every day for 3 days.  Dispense: 3 Tab; Refill: 0  - nystatin-triamcinalone (MYCOLOG) 430207-4.1 UNIT/GM-% Ointment; Apply 1 Units topically 2 times a day.  Dispense: 60 g; Refill: 0  - POCT Urinalysis    2. History of candidal vulvovaginitis  - VAGINAL PATHOGENS DNA PANEL; Future  - fluconazole (DIFLUCAN) 150 MG tablet; Take 1 Tab by mouth every day for 3 days.  Dispense: 3 Tab; Refill: 0  - nystatin-triamcinalone (MYCOLOG) 076050-9.1 UNIT/GM-% Ointment; Apply 1 Units topically 2 times a day.  Dispense: 60 g; Refill: 0    3. Glucosuria  - POCT Glucose    4. Type 2 diabetes mellitus with hyperglycemia, with long-term current use of insulin ([facility])  - POCT Urinalysis    1.  History and symptoms consistent with candidal vulvovaginitis.  Vaginal pathogen testing obtained to confirm.  Patient treated with Diflucan and topical Mycolog.  Patient advised she should only apply Mycolog to the external genitalia.  Patient may take Diflucan every 72 hours until resolution of symptoms.  I will contact patient with results and adjust plan as indicated.  Return precautions reviewed.    2.  Patient has 500 glucose in her urine and fingerstick glucose is 314.  She states that sugars have been in the 200s. Last a1c was 14. She did not take her insulin this morning.  She does have adequate insulin at home.  Patient also advised that uncontrolled diabetes is likely precipitating these infections.  It is very important that she establish care to better manage.    Patient states that she is working on  establishing care at [facility].  I did offer to put in a referral to endocrinology, pharmacology or the diabetic clinic-she declines.  If she changes her mind or runs out of her insulin I would like her to return to clinic to readdress.    Differential diagnosis, natural history, supportive care, and indications for immediate follow-up discussed.

## 2021-01-31 LAB
AMBIGUOUS DTTM AMBI4: NORMAL
AMBIGUOUS DTTM AMBI4: NORMAL
CANDIDA DNA VAG QL PROBE+SIG AMP: POSITIVE
G VAGINALIS DNA VAG QL PROBE+SIG AMP: POSITIVE
SIGNIFICANT IND 70042: NORMAL
SITE SITE: NORMAL
SOURCE SOURCE: NORMAL
T VAGINALIS DNA VAG QL PROBE+SIG AMP: NEGATIVE

## 2021-02-01 ENCOUNTER — TELEPHONE (OUTPATIENT)
Dept: URGENT CARE | Facility: CLINIC | Age: 41
End: 2021-02-01

## 2021-02-01 DIAGNOSIS — B96.89 BACTERIAL VAGINOSIS: ICD-10-CM

## 2021-02-01 DIAGNOSIS — N76.0 ACUTE VAGINITIS: ICD-10-CM

## 2021-02-01 DIAGNOSIS — N76.0 BACTERIAL VAGINOSIS: ICD-10-CM

## 2021-02-01 DIAGNOSIS — Z86.19 HISTORY OF CANDIDAL VULVOVAGINITIS: ICD-10-CM

## 2021-02-01 RX ORDER — FLUCONAZOLE 150 MG/1
150 TABLET ORAL DAILY
Qty: 2 TAB | Refills: 0 | Status: SHIPPED | OUTPATIENT
Start: 2021-02-01 | End: 2021-02-03

## 2021-02-01 RX ORDER — METRONIDAZOLE 500 MG/1
500 TABLET ORAL 2 TIMES DAILY
Qty: 14 TAB | Refills: 0 | Status: SHIPPED | OUTPATIENT
Start: 2021-02-01 | End: 2021-02-08

## 2021-02-01 NOTE — TELEPHONE ENCOUNTER
Patient contacted with lab results.  She has taken 2 Diflucan so far and states that she has not noticed much improvement in her symptoms.  She was advised that her testing is positive for both Candida and Gardnerella.  I recommend treating for both.  Flagyl sent to patient's pharmacy as well as 2 additional doses of Diflucan.  Patient reminded of the importance of having her diabetes evaluated and more effectively treated.  She will follow-up on this this week.

## 2021-10-13 ENCOUNTER — TELEPHONE (OUTPATIENT)
Dept: URGENT CARE | Facility: CLINIC | Age: 41
End: 2021-10-13

## 2021-10-13 ENCOUNTER — HOSPITAL ENCOUNTER (OUTPATIENT)
Facility: MEDICAL CENTER | Age: 41
End: 2021-10-13
Attending: PHYSICIAN ASSISTANT
Payer: COMMERCIAL

## 2021-10-13 ENCOUNTER — OFFICE VISIT (OUTPATIENT)
Dept: URGENT CARE | Facility: CLINIC | Age: 41
End: 2021-10-13
Payer: COMMERCIAL

## 2021-10-13 VITALS
BODY MASS INDEX: 23.9 KG/M2 | OXYGEN SATURATION: 97 % | HEART RATE: 88 BPM | SYSTOLIC BLOOD PRESSURE: 104 MMHG | WEIGHT: 140 LBS | TEMPERATURE: 97.9 F | HEIGHT: 64 IN | DIASTOLIC BLOOD PRESSURE: 80 MMHG

## 2021-10-13 DIAGNOSIS — R30.0 DYSURIA: ICD-10-CM

## 2021-10-13 DIAGNOSIS — N30.00 ACUTE CYSTITIS WITHOUT HEMATURIA: ICD-10-CM

## 2021-10-13 DIAGNOSIS — E11.9 TYPE 2 DIABETES MELLITUS WITHOUT COMPLICATION, WITHOUT LONG-TERM CURRENT USE OF INSULIN (HCC): ICD-10-CM

## 2021-10-13 LAB
APPEARANCE UR: NORMAL
BILIRUB UR STRIP-MCNC: NEGATIVE MG/DL
COLOR UR AUTO: NORMAL
GLUCOSE BLD-MCNC: 400 MG/DL (ref 70–100)
GLUCOSE UR STRIP.AUTO-MCNC: 1000 MG/DL
INT CON NEG: NEGATIVE
INT CON POS: POSITIVE
KETONES UR STRIP.AUTO-MCNC: NORMAL MG/DL
LEUKOCYTE ESTERASE UR QL STRIP.AUTO: NORMAL
NITRITE UR QL STRIP.AUTO: POSITIVE
PH UR STRIP.AUTO: 6 [PH] (ref 5–8)
POC URINE PREGNANCY TEST: NEGATIVE
PROT UR QL STRIP: NEGATIVE MG/DL
RBC UR QL AUTO: NEGATIVE
SP GR UR STRIP.AUTO: 1.01
UROBILINOGEN UR STRIP-MCNC: 0.2 MG/DL

## 2021-10-13 PROCEDURE — 87086 URINE CULTURE/COLONY COUNT: CPT

## 2021-10-13 PROCEDURE — 81002 URINALYSIS NONAUTO W/O SCOPE: CPT | Performed by: PHYSICIAN ASSISTANT

## 2021-10-13 PROCEDURE — 87186 SC STD MICRODIL/AGAR DIL: CPT

## 2021-10-13 PROCEDURE — 82962 GLUCOSE BLOOD TEST: CPT | Performed by: PHYSICIAN ASSISTANT

## 2021-10-13 PROCEDURE — 87077 CULTURE AEROBIC IDENTIFY: CPT

## 2021-10-13 PROCEDURE — 99214 OFFICE O/P EST MOD 30 MIN: CPT | Performed by: PHYSICIAN ASSISTANT

## 2021-10-13 PROCEDURE — 81025 URINE PREGNANCY TEST: CPT | Performed by: PHYSICIAN ASSISTANT

## 2021-10-13 RX ORDER — NITROFURANTOIN 25; 75 MG/1; MG/1
100 CAPSULE ORAL EVERY 12 HOURS
Qty: 10 CAPSULE | Refills: 0 | Status: SHIPPED | OUTPATIENT
Start: 2021-10-13 | End: 2021-10-13 | Stop reason: SDUPTHER

## 2021-10-13 RX ORDER — NITROFURANTOIN 25; 75 MG/1; MG/1
100 CAPSULE ORAL EVERY 12 HOURS
Qty: 10 CAPSULE | Refills: 0 | Status: SHIPPED | OUTPATIENT
Start: 2021-10-13 | End: 2021-10-13

## 2021-10-13 RX ORDER — NITROFURANTOIN 25; 75 MG/1; MG/1
100 CAPSULE ORAL EVERY 12 HOURS
Qty: 10 CAPSULE | Refills: 0 | Status: SHIPPED | OUTPATIENT
Start: 2021-10-13 | End: 2021-10-18

## 2021-10-13 ASSESSMENT — ENCOUNTER SYMPTOMS
VOMITING: 0
SWEATS: 0
CHILLS: 0
FLANK PAIN: 0
NAUSEA: 1

## 2021-10-13 ASSESSMENT — FIBROSIS 4 INDEX: FIB4 SCORE: 1.12

## 2021-10-13 NOTE — PROGRESS NOTES
Subjective     Tonya Bro is a 41 y.o. female who presents with UTI (x 4 days, the patient stated it feels like it has traveled to her kidneys. There's a lot of pain and pressure.  Not fever, chills, or nausea.)            Patient type II diabetic.  Has been off her insulin for the last 4 months.  She is attempting to manage with diet and exercise.    Dysuria   This is a new problem. The current episode started in the past 7 days. The problem occurs every urination. The problem has been unchanged. The quality of the pain is described as burning. There has been no fever. The fever has been present for less than 1 day. There is no history of pyelonephritis. Associated symptoms include frequency, nausea and urgency. Pertinent negatives include no chills, discharge, flank pain, hematuria, hesitancy, sweats or vomiting. She has tried nothing for the symptoms. The treatment provided no relief. There is no history of kidney stones or recurrent UTIs.       PMH:  has a past medical history of Diabetes ([facility]) and Vaginal yeast infection (11/26/2018). She also has no past medical history of Anginal syndrome ([facility]), Arrhythmia, Arthritis, Asthma, At risk for sleep apnea, Back pain, Blood clotting disorder ([facility]), Bronchitis, Cancer ([facility]), Cataract, Congestive heart failure ([facility]), COPD (chronic obstructive pulmonary disease) ([facility]), Dialysis patient ([facility]), Disorder of thyroid, Fall, Glaucoma, Heart murmur, Heart valve disease, Hemorrhagic disorder ([facility]), Hypertension, Indigestion, Jaundice, Myocardial infarct ([facility]), Pacemaker, Pneumonia, Psychiatric problem, Renal disorder, Rheumatic fever, Seizure ([facility]), Stroke ([facility]), or Urinary incontinence.  MEDS: No current outpatient medications on file.  ALLERGIES:   Allergies   Allergen Reactions   • Bloodless      SURGHX:   Past Surgical History:   Procedure Laterality Date   • IRRIGATION & DEBRIDEMENT ORTHO Right 12/17/2019    Procedure: IRRIGATION AND DEBRIDEMENT, WOUND-  "THIGH, AND ANTIBIOTIC DELIVERY DEVICE PLACEMENT;  Surgeon: Shamar Rubin M.D.;  Location: SURGERY [facility];  Service: Orthopedics   • IRRIGATION & DEBRIDEMENT ORTHO Right 12/11/2019    Procedure: IRRIGATION AND DEBRIDEMENT, WOUND - THIGH;  Surgeon: Shamar Rubin M.D.;  Location: SURGERY [facility];  Service: Orthopedics     SOCHX:  reports that she has quit smoking. She has never used smokeless tobacco. She reports that she does not drink alcohol and does not use drugs.  FH: family history is not on file.      Review of Systems   Constitutional: Negative for chills.   Gastrointestinal: Positive for nausea. Negative for vomiting.   Genitourinary: Positive for dysuria, frequency and urgency. Negative for flank pain, hematuria and hesitancy.       Medications, Allergies, and current problem list reviewed today in Epic           Objective     /80 (BP Location: Left arm, Patient Position: Sitting, BP Cuff Size: Adult)   Pulse 88   Temp 36.6 °C (97.9 °F) (Temporal)   Ht 1.626 m (5' 4\")   Wt 63.5 kg (140 lb)   SpO2 97%   BMI 24.03 kg/m²      Physical Exam  Vitals and nursing note reviewed.   Constitutional:       General: She is not in acute distress.     Appearance: She is well-developed. She is not diaphoretic.   HENT:      Head: Normocephalic and atraumatic.   Eyes:      Conjunctiva/sclera: Conjunctivae normal.   Cardiovascular:      Rate and Rhythm: Normal rate and regular rhythm.      Heart sounds: Normal heart sounds.   Pulmonary:      Effort: Pulmonary effort is normal. No respiratory distress.      Breath sounds: Normal breath sounds. No wheezing, rhonchi or rales.   Abdominal:      General: Abdomen is flat. There is no distension.      Palpations: Abdomen is soft.      Tenderness: There is no abdominal tenderness. There is no right CVA tenderness, left CVA tenderness, guarding or rebound.   Musculoskeletal:      Cervical back: Normal range of motion and neck supple.      Right lower " leg: No edema.      Left lower leg: No edema.   Skin:     General: Skin is warm and dry.   Neurological:      Mental Status: She is alert and oriented to person, place, and time.   Psychiatric:         Behavior: Behavior normal.         Thought Content: Thought content normal.         Judgment: Judgment normal.                   Assessment & Plan         1. Dysuria  POCT Urinalysis    POCT PREGNANCY    POCT Glucose   2. Acute cystitis without hematuria  Urine Culture    DISCONTINUED: nitrofurantoin (MACROBID) 100 MG Cap   3. Type 2 diabetes mellitus without complication, without long-term current use of insulin ([facility])  AMB REFERRAL TO ESTABLISH WITH [facility] PCP     Dysuria, frequency, urgency.  No fever, vomiting or flank pain.  Urinalysis shows leukocytes, nitrates and cloudy urine.    Of note patient had greater than 1000 glucose in her urine.  Subsequent fingerstick was 400.  No ketones.  Patient is a type II diabetic currently off of her medication.  She states she feels well denying shortness of breath, chest pain, fevers.  Had long discussion with patient that her sugars have been dangerously high for several months.  It does not appear that her lifestyle changes are properly managing her diabetes.  I strongly encouraged her to restart her diabetic medication which she says she has plenty of at home.  Did recommend ER for any symptoms of DKA which were discussed at length.  She currently does not have a primary care provider.  I did refer her back to [facility] emergently.  She declines endocrinology and diabetes pharmacological referral at this time.    Return to clinic or go to ED if symptoms worsen or persist. Indications for ED discussed at length. Patient/Parent/Guardian voices understanding. Follow-up with your primary care provider in 3-5 days. Red flag symptoms discussed. All side effects of medication discussed including allergic response, GI upset, tendon injury, rash, sedation etc.    Please note that  this dictation was created using voice recognition software. I have made every reasonable attempt to correct obvious errors, but I expect that there are errors of grammar and possibly content that I did not discover before finalizing the note.

## 2021-10-16 LAB
BACTERIA UR CULT: ABNORMAL
SIGNIFICANT IND 70042: ABNORMAL
SITE SITE: ABNORMAL
SOURCE SOURCE: ABNORMAL

## 2021-11-08 ENCOUNTER — OFFICE VISIT (OUTPATIENT)
Dept: URGENT CARE | Facility: CLINIC | Age: 41
End: 2021-11-08
Payer: COMMERCIAL

## 2021-11-08 ENCOUNTER — HOSPITAL ENCOUNTER (OUTPATIENT)
Facility: MEDICAL CENTER | Age: 41
End: 2021-11-08
Attending: STUDENT IN AN ORGANIZED HEALTH CARE EDUCATION/TRAINING PROGRAM
Payer: COMMERCIAL

## 2021-11-08 ENCOUNTER — TELEPHONE (OUTPATIENT)
Dept: SCHEDULING | Facility: IMAGING CENTER | Age: 41
End: 2021-11-08

## 2021-11-08 VITALS
BODY MASS INDEX: 23.46 KG/M2 | WEIGHT: 137.4 LBS | DIASTOLIC BLOOD PRESSURE: 88 MMHG | HEART RATE: 71 BPM | SYSTOLIC BLOOD PRESSURE: 120 MMHG | OXYGEN SATURATION: 100 % | RESPIRATION RATE: 16 BRPM | HEIGHT: 64 IN | TEMPERATURE: 98.2 F

## 2021-11-08 DIAGNOSIS — Z79.4 TYPE 2 DIABETES MELLITUS WITH HYPERGLYCEMIA, WITH LONG-TERM CURRENT USE OF INSULIN (HCC): ICD-10-CM

## 2021-11-08 DIAGNOSIS — N12 PYELONEPHRITIS: ICD-10-CM

## 2021-11-08 DIAGNOSIS — N76.0 VAGINOSIS: ICD-10-CM

## 2021-11-08 DIAGNOSIS — E11.65 TYPE 2 DIABETES MELLITUS WITH HYPERGLYCEMIA, WITH LONG-TERM CURRENT USE OF INSULIN (HCC): ICD-10-CM

## 2021-11-08 LAB
APPEARANCE UR: NORMAL
BILIRUB UR STRIP-MCNC: NEGATIVE MG/DL
COLOR UR AUTO: YELLOW
GLUCOSE BLD-MCNC: 126 MG/DL (ref 70–100)
GLUCOSE UR STRIP.AUTO-MCNC: NORMAL MG/DL
KETONES UR STRIP.AUTO-MCNC: NORMAL MG/DL
LEUKOCYTE ESTERASE UR QL STRIP.AUTO: NEGATIVE
NITRITE UR QL STRIP.AUTO: POSITIVE
PH UR STRIP.AUTO: 5 [PH] (ref 5–8)
PROT UR QL STRIP: NEGATIVE MG/DL
RBC UR QL AUTO: NEGATIVE
SP GR UR STRIP.AUTO: >=1.03
UROBILINOGEN UR STRIP-MCNC: NORMAL MG/DL

## 2021-11-08 PROCEDURE — 87510 GARDNER VAG DNA DIR PROBE: CPT

## 2021-11-08 PROCEDURE — 87086 URINE CULTURE/COLONY COUNT: CPT

## 2021-11-08 PROCEDURE — 87077 CULTURE AEROBIC IDENTIFY: CPT

## 2021-11-08 PROCEDURE — 87480 CANDIDA DNA DIR PROBE: CPT

## 2021-11-08 PROCEDURE — 81002 URINALYSIS NONAUTO W/O SCOPE: CPT | Performed by: STUDENT IN AN ORGANIZED HEALTH CARE EDUCATION/TRAINING PROGRAM

## 2021-11-08 PROCEDURE — 87660 TRICHOMONAS VAGIN DIR PROBE: CPT

## 2021-11-08 PROCEDURE — 82962 GLUCOSE BLOOD TEST: CPT | Performed by: STUDENT IN AN ORGANIZED HEALTH CARE EDUCATION/TRAINING PROGRAM

## 2021-11-08 PROCEDURE — 99214 OFFICE O/P EST MOD 30 MIN: CPT | Performed by: STUDENT IN AN ORGANIZED HEALTH CARE EDUCATION/TRAINING PROGRAM

## 2021-11-08 PROCEDURE — 87186 SC STD MICRODIL/AGAR DIL: CPT

## 2021-11-08 RX ORDER — CEFDINIR 300 MG/1
300 CAPSULE ORAL 2 TIMES DAILY
Qty: 20 CAPSULE | Refills: 0 | Status: SHIPPED | OUTPATIENT
Start: 2021-11-08 | End: 2021-11-18

## 2021-11-08 RX ORDER — FLUCONAZOLE 150 MG/1
TABLET ORAL
Qty: 2 TABLET | Refills: 0 | Status: SHIPPED | OUTPATIENT
Start: 2021-11-08 | End: 2022-03-02

## 2021-11-08 ASSESSMENT — FIBROSIS 4 INDEX: FIB4 SCORE: 1.12

## 2021-11-08 NOTE — PROGRESS NOTES
Subjective:   CHIEF COMPLAINT  Chief Complaint   Patient presents with   • Pyelonephritis     Feverish, lower back/kidney pain, headaches/ pt states waking up with headaches. Requesting glucose.  recent UTI x week.        HPI  Tonya Bro is a 41 y.o. female with a history of uncontrolled type 2 diabetes managed on insulin with history of noncompliance and recurrent UTIs who presents with a chief complaint of concerns for possible kidney infection.  She says approximately 1 week ago she developed left flank pain.  Since that time the symptoms have been getting more constant and says they have been persistent for the last 24 hours.  Says the discomfort is most notable at night.  There are no specific alleviating or aggravating factors. No pain radiating anteriorly. She denies experiencing any dysuria, hematuria or frequency.  Admits associated symptoms of vaginal pruritus with white discharge.  Patient also reports feeling feverish, and chills but has not had any objective fevers.  No nausea or vomiting.  She was seen in urgent care approximately 4 weeks ago for a UTI and appropriately treated on Macrobid per culture sensitivity.      REVIEW OF SYSTEMS  General: no fever or chills  GI: no nausea or vomiting  See HPI for further details.    PAST MEDICAL HISTORY  Patient Active Problem List    Diagnosis Date Noted   • Type 2 diabetes mellitus with hyperglycemia, with long-term current use of insulin ([facility]) 12/11/2019   • Acute hematogenous osteomyelitis of right femur ([facility]) 12/11/2019       SURGICAL HISTORY   has a past surgical history that includes irrigation & debridement ortho (Right, 12/11/2019) and irrigation & debridement ortho (Right, 12/17/2019).    ALLERGIES  Allergies   Allergen Reactions   • Bloodless        CURRENT MEDICATIONS  Home Medications     Reviewed by Gautam Palacio D.O. (Physician) on 11/08/21 at 1246  Med List Status: <None>   Medication Last Dose Status   Insulin Admin Supplies  "Misc Taking Active                SOCIAL HISTORY  Social History     Tobacco Use   • Smoking status: Former Smoker   • Smokeless tobacco: Never Used   Vaping Use   • Vaping Use: Never used   Substance and Sexual Activity   • Alcohol use: No   • Drug use: No   • Sexual activity: Yes     Birth control/protection: None       FAMILY HISTORY  No family history on file.       Objective:   PHYSICAL EXAM  VITAL SIGNS: /88   Pulse 71   Temp 36.8 °C (98.2 °F)   Resp 16   Ht 1.626 m (5' 4\")   Wt 62.3 kg (137 lb 6.4 oz)   LMP 10/28/2021 (Approximate)   SpO2 100%   BMI 23.58 kg/m²     Gen: no acute distress, normal voice  Skin: dry, intact, moist mucosal membranes  Lungs: CTAB w/ symmetric expansion  CV: RRR w/o murmurs or clicks  : Left CVAT  Psych: normal affect, normal judgement, alert, awake    UA: 100 glucose.  15 ketones.  Positive nitrates.  No RBCs or WBCs.    POC glucose: 126    Assessment/Plan:     1. Pyelonephritis  URINE CULTURE(NEW)    POCT Urinalysis    cefdinir (OMNICEF) 300 MG Cap    POCT Glucose   2. Type 2 diabetes mellitus with hyperglycemia, with long-term current use of insulin ([facility])     3. Vaginosis  VAGINAL PATHOGENS DNA PANEL    fluconazole (DIFLUCAN) 150 MG tablet    POCT Glucose   1-2) 41-year old female with history of uncontrolled diabetes here with left-sided flank pain, with reproducible left CVA tenderness found to have nitrites on urine consistent with an acute pyelonephritis.  Patient was not experiencing any dysuria however given the history of uncontrolled diabetes it wouldn't be surprising if she has underlying autonomic neuropathy of her bladder.  She is not experiencing any pain that radiates anteriorly.  Further there is no blood in her urine and highly unlikely symptoms due to ureterolithiasis.  She has been tolerating PO intake w/o any nausea or vomiting.  No systemic symptoms.  She was seen in urgent care last month and treated for UTI using Macrobid.  -Ordered cefdinir " p.o. twice daily x10 days  -ordered urine culture. Contact patient with results at [phone]  -2 L H2O daily  -Discussed the importance of tight glycemic control     3)Patient with vaginal pruritus with white discharge consistent with yeast infection secondary to uncontrolled diabetes as described above.  -Ordered Diflucan  -Ordered vaginal pathogen swab      Differential diagnosis, natural history, supportive care, and indications for immediate follow-up discussed. All questions answered. Patient agrees with the plan of care.    Follow-up as needed if symptoms worsen or fail to improve to PCP, Urgent care or Emergency Room.    Please note that this dictation was created using voice recognition software. I have made a reasonable attempt to correct obvious errors, but I expect that there are errors of grammar and possibly content that I did not discover before finalizing the note.

## 2021-11-09 LAB
CANDIDA DNA VAG QL PROBE+SIG AMP: NEGATIVE
G VAGINALIS DNA VAG QL PROBE+SIG AMP: NEGATIVE
T VAGINALIS DNA VAG QL PROBE+SIG AMP: NEGATIVE

## 2022-01-14 ENCOUNTER — OFFICE VISIT (OUTPATIENT)
Dept: URGENT CARE | Facility: CLINIC | Age: 42
End: 2022-01-14
Payer: COMMERCIAL

## 2022-01-14 ENCOUNTER — HOSPITAL ENCOUNTER (OUTPATIENT)
Facility: MEDICAL CENTER | Age: 42
End: 2022-01-14
Attending: STUDENT IN AN ORGANIZED HEALTH CARE EDUCATION/TRAINING PROGRAM
Payer: COMMERCIAL

## 2022-01-14 VITALS
HEART RATE: 93 BPM | RESPIRATION RATE: 16 BRPM | TEMPERATURE: 97.6 F | BODY MASS INDEX: 23.35 KG/M2 | HEIGHT: 64 IN | WEIGHT: 136.8 LBS | OXYGEN SATURATION: 98 %

## 2022-01-14 DIAGNOSIS — R53.83 MALAISE AND FATIGUE: ICD-10-CM

## 2022-01-14 DIAGNOSIS — R53.81 MALAISE AND FATIGUE: ICD-10-CM

## 2022-01-14 PROCEDURE — 0240U HCHG SARS-COV-2 COVID-19 NFCT DS RESP RNA 3 TRGT MIC: CPT

## 2022-01-14 PROCEDURE — 99213 OFFICE O/P EST LOW 20 MIN: CPT | Performed by: STUDENT IN AN ORGANIZED HEALTH CARE EDUCATION/TRAINING PROGRAM

## 2022-01-14 ASSESSMENT — ENCOUNTER SYMPTOMS
MYALGIAS: 0
FEVER: 0
COUGH: 1
CHILLS: 0

## 2022-01-14 ASSESSMENT — FIBROSIS 4 INDEX: FIB4 SCORE: 1.12

## 2022-01-14 NOTE — PROGRESS NOTES
"Subjective     Tonya Bro is a 41 y.o. female who presents with Other (sneezing, stuffy nose ( covid test ))            Patient is a very agreeable 41-year-old completely nonvaccinated  that presents to clinic with complaints of congestion sneezing runny nose she does have seasonal allergies however per her workplace she needs to be tested for COVID-19.      Review of Systems   Constitutional: Negative for chills, fever and malaise/fatigue.   HENT: Positive for congestion.    Respiratory: Positive for cough.    Musculoskeletal: Negative for myalgias.   All other systems reviewed and are negative.             Objective     Pulse 93   Temp 36.4 °C (97.6 °F) (Temporal)   Resp 16   Ht 1.626 m (5' 4\")   Wt 62.1 kg (136 lb 12.8 oz)   SpO2 98%   BMI 23.48 kg/m²      Physical Exam  Vitals reviewed.   Constitutional:       Appearance: She is ill-appearing.   HENT:      Nose: Congestion and rhinorrhea present.   Neurological:      Mental Status: She is alert.                             Assessment & Plan        1. Malaise and fatigue  Patient 41-year-old female presents to clinic with sneezing congestion which to be tested for COVID-19  Plan:  1. COVID-19 PCR testing  2. Influenza A and B PCR testing.    Patient was counseled that her results will be available in 24 to 48 hours via her MyChart.  - CoV-2 and Flu A/B by PCR (24 hour In-House): Collect NP swab in VTM; Future                "

## 2022-01-15 LAB
FLUAV RNA SPEC QL NAA+PROBE: NEGATIVE
FLUBV RNA SPEC QL NAA+PROBE: NEGATIVE
SARS-COV-2 RNA RESP QL NAA+PROBE: DETECTED
SPECIMEN SOURCE: ABNORMAL

## 2022-03-02 ENCOUNTER — HOSPITAL ENCOUNTER (EMERGENCY)
Facility: MEDICAL CENTER | Age: 42
End: 2022-03-02
Attending: EMERGENCY MEDICINE
Payer: COMMERCIAL

## 2022-03-02 VITALS
OXYGEN SATURATION: 98 % | BODY MASS INDEX: 23.67 KG/M2 | HEIGHT: 64 IN | DIASTOLIC BLOOD PRESSURE: 88 MMHG | WEIGHT: 138.67 LBS | TEMPERATURE: 97.3 F | HEART RATE: 75 BPM | RESPIRATION RATE: 16 BRPM | SYSTOLIC BLOOD PRESSURE: 135 MMHG

## 2022-03-02 DIAGNOSIS — B37.9 CANDIDA INFECTION: ICD-10-CM

## 2022-03-02 DIAGNOSIS — Z79.4 TYPE 2 DIABETES MELLITUS WITH HYPERGLYCEMIA, WITH LONG-TERM CURRENT USE OF INSULIN (HCC): ICD-10-CM

## 2022-03-02 DIAGNOSIS — N39.0 ACUTE UTI: ICD-10-CM

## 2022-03-02 DIAGNOSIS — R10.2 PELVIC PAIN: ICD-10-CM

## 2022-03-02 DIAGNOSIS — E11.65 TYPE 2 DIABETES MELLITUS WITH HYPERGLYCEMIA, WITH LONG-TERM CURRENT USE OF INSULIN (HCC): ICD-10-CM

## 2022-03-02 LAB
ALBUMIN SERPL BCP-MCNC: 4.2 G/DL (ref 3.2–4.9)
ALBUMIN/GLOB SERPL: 1.4 G/DL
ALP SERPL-CCNC: 91 U/L (ref 30–99)
ALT SERPL-CCNC: 9 U/L (ref 2–50)
ANION GAP SERPL CALC-SCNC: 10 MMOL/L (ref 7–16)
APPEARANCE UR: ABNORMAL
AST SERPL-CCNC: 11 U/L (ref 12–45)
BACTERIA #/AREA URNS HPF: ABNORMAL /HPF
BASOPHILS # BLD AUTO: 0.4 % (ref 0–1.8)
BASOPHILS # BLD: 0.03 K/UL (ref 0–0.12)
BILIRUB SERPL-MCNC: 0.2 MG/DL (ref 0.1–1.5)
BILIRUB UR QL STRIP.AUTO: NEGATIVE
BLOOD CULTURE HOLD CXBCH: NORMAL
BUN SERPL-MCNC: 17 MG/DL (ref 8–22)
C TRACH DNA GENITAL QL NAA+PROBE: NEGATIVE
CALCIUM SERPL-MCNC: 9.4 MG/DL (ref 8.5–10.5)
CANDIDA DNA VAG QL PROBE+SIG AMP: POSITIVE
CHLORIDE SERPL-SCNC: 102 MMOL/L (ref 96–112)
CO2 SERPL-SCNC: 23 MMOL/L (ref 20–33)
COLOR UR: YELLOW
CREAT SERPL-MCNC: 0.72 MG/DL (ref 0.5–1.4)
EKG IMPRESSION: NORMAL
EOSINOPHIL # BLD AUTO: 0.02 K/UL (ref 0–0.51)
EOSINOPHIL NFR BLD: 0.2 % (ref 0–6.9)
EPI CELLS #/AREA URNS HPF: ABNORMAL /HPF
ERYTHROCYTE [DISTWIDTH] IN BLOOD BY AUTOMATED COUNT: 39.1 FL (ref 35.9–50)
G VAGINALIS DNA VAG QL PROBE+SIG AMP: NEGATIVE
GLOBULIN SER CALC-MCNC: 3.1 G/DL (ref 1.9–3.5)
GLUCOSE SERPL-MCNC: 228 MG/DL (ref 65–99)
GLUCOSE UR STRIP.AUTO-MCNC: 500 MG/DL
HCT VFR BLD AUTO: 41.6 % (ref 37–47)
HGB BLD-MCNC: 14.5 G/DL (ref 12–16)
IMM GRANULOCYTES # BLD AUTO: 0.03 K/UL (ref 0–0.11)
IMM GRANULOCYTES NFR BLD AUTO: 0.4 % (ref 0–0.9)
KETONES UR STRIP.AUTO-MCNC: NEGATIVE MG/DL
LACTATE BLD-SCNC: 0.8 MMOL/L (ref 0.5–2)
LEUKOCYTE ESTERASE UR QL STRIP.AUTO: ABNORMAL
LIPASE SERPL-CCNC: 39 U/L (ref 11–82)
LYMPHOCYTES # BLD AUTO: 1.84 K/UL (ref 1–4.8)
LYMPHOCYTES NFR BLD: 21.6 % (ref 22–41)
MCH RBC QN AUTO: 30.3 PG (ref 27–33)
MCHC RBC AUTO-ENTMCNC: 34.9 G/DL (ref 33.6–35)
MCV RBC AUTO: 87 FL (ref 81.4–97.8)
MICRO URNS: ABNORMAL
MONOCYTES # BLD AUTO: 0.48 K/UL (ref 0–0.85)
MONOCYTES NFR BLD AUTO: 5.6 % (ref 0–13.4)
N GONORRHOEA DNA GENITAL QL NAA+PROBE: NEGATIVE
NEUTROPHILS # BLD AUTO: 6.13 K/UL (ref 2–7.15)
NEUTROPHILS NFR BLD: 71.8 % (ref 44–72)
NITRITE UR QL STRIP.AUTO: POSITIVE
NRBC # BLD AUTO: 0 K/UL
NRBC BLD-RTO: 0 /100 WBC
PH UR STRIP.AUTO: 6 [PH] (ref 5–8)
PLATELET # BLD AUTO: 288 K/UL (ref 164–446)
PMV BLD AUTO: 10.1 FL (ref 9–12.9)
POTASSIUM SERPL-SCNC: 3.7 MMOL/L (ref 3.6–5.5)
PROT SERPL-MCNC: 7.3 G/DL (ref 6–8.2)
PROT UR QL STRIP: NEGATIVE MG/DL
RBC # BLD AUTO: 4.78 M/UL (ref 4.2–5.4)
RBC # URNS HPF: ABNORMAL /HPF
RBC UR QL AUTO: NEGATIVE
SODIUM SERPL-SCNC: 135 MMOL/L (ref 135–145)
SP GR UR STRIP.AUTO: 1.02
SPECIMEN SOURCE: NORMAL
T VAGINALIS DNA VAG QL PROBE+SIG AMP: NEGATIVE
UROBILINOGEN UR STRIP.AUTO-MCNC: 0.2 MG/DL
WBC # BLD AUTO: 8.5 K/UL (ref 4.8–10.8)
WBC #/AREA URNS HPF: ABNORMAL /HPF

## 2022-03-02 PROCEDURE — 83605 ASSAY OF LACTIC ACID: CPT

## 2022-03-02 PROCEDURE — A9270 NON-COVERED ITEM OR SERVICE: HCPCS | Performed by: EMERGENCY MEDICINE

## 2022-03-02 PROCEDURE — 700111 HCHG RX REV CODE 636 W/ 250 OVERRIDE (IP): Performed by: EMERGENCY MEDICINE

## 2022-03-02 PROCEDURE — 83690 ASSAY OF LIPASE: CPT

## 2022-03-02 PROCEDURE — 96374 THER/PROPH/DIAG INJ IV PUSH: CPT

## 2022-03-02 PROCEDURE — 93005 ELECTROCARDIOGRAM TRACING: CPT | Performed by: EMERGENCY MEDICINE

## 2022-03-02 PROCEDURE — 99285 EMERGENCY DEPT VISIT HI MDM: CPT

## 2022-03-02 PROCEDURE — 87480 CANDIDA DNA DIR PROBE: CPT

## 2022-03-02 PROCEDURE — 36415 COLL VENOUS BLD VENIPUNCTURE: CPT

## 2022-03-02 PROCEDURE — 87591 N.GONORRHOEAE DNA AMP PROB: CPT

## 2022-03-02 PROCEDURE — 93005 ELECTROCARDIOGRAM TRACING: CPT

## 2022-03-02 PROCEDURE — 80053 COMPREHEN METABOLIC PANEL: CPT

## 2022-03-02 PROCEDURE — 81001 URINALYSIS AUTO W/SCOPE: CPT

## 2022-03-02 PROCEDURE — 87510 GARDNER VAG DNA DIR PROBE: CPT

## 2022-03-02 PROCEDURE — 87491 CHLMYD TRACH DNA AMP PROBE: CPT

## 2022-03-02 PROCEDURE — 700102 HCHG RX REV CODE 250 W/ 637 OVERRIDE(OP): Performed by: EMERGENCY MEDICINE

## 2022-03-02 PROCEDURE — 85025 COMPLETE CBC W/AUTO DIFF WBC: CPT

## 2022-03-02 PROCEDURE — 87660 TRICHOMONAS VAGIN DIR PROBE: CPT

## 2022-03-02 RX ORDER — DOXYCYCLINE 100 MG/1
100 TABLET ORAL ONCE
Status: COMPLETED | OUTPATIENT
Start: 2022-03-02 | End: 2022-03-02

## 2022-03-02 RX ORDER — CEFTRIAXONE 2 G/1
2 INJECTION, POWDER, FOR SOLUTION INTRAMUSCULAR; INTRAVENOUS ONCE
Status: COMPLETED | OUTPATIENT
Start: 2022-03-02 | End: 2022-03-02

## 2022-03-02 RX ORDER — DOXYCYCLINE 100 MG/1
100 CAPSULE ORAL 2 TIMES DAILY
Qty: 14 CAPSULE | Refills: 0 | Status: SHIPPED | OUTPATIENT
Start: 2022-03-02 | End: 2022-03-09

## 2022-03-02 RX ORDER — CALCIUM CITRATE/VITAMIN D3 200MG-6.25
1 TABLET ORAL PRN
Qty: 100 STRIP | Refills: 1 | Status: SHIPPED | OUTPATIENT
Start: 2022-03-02 | End: 2022-03-03 | Stop reason: SDUPTHER

## 2022-03-02 RX ORDER — FLUCONAZOLE 150 MG/1
TABLET ORAL
Qty: 3 TABLET | Refills: 2 | Status: SHIPPED | OUTPATIENT
Start: 2022-03-02 | End: 2022-12-07

## 2022-03-02 RX ORDER — CEFDINIR 300 MG/1
300 CAPSULE ORAL 2 TIMES DAILY
Qty: 20 CAPSULE | Refills: 0 | Status: SHIPPED | OUTPATIENT
Start: 2022-03-02 | End: 2022-03-06

## 2022-03-02 RX ADMIN — DOXYCYCLINE 100 MG: 100 TABLET, FILM COATED ORAL at 10:07

## 2022-03-02 RX ADMIN — CEFTRIAXONE SODIUM 2 G: 2 INJECTION, POWDER, FOR SOLUTION INTRAMUSCULAR; INTRAVENOUS at 10:06

## 2022-03-02 ASSESSMENT — FIBROSIS 4 INDEX: FIB4 SCORE: 1.12

## 2022-03-02 NOTE — ED TRIAGE NOTES
"Chief Complaint   Patient presents with   • Dizziness     Since midnight, pt checked her BS this morning and it was 166 mg/dL. Pt self reports confusion and states she wants to rule out sepsis.    • Abdominal Pain     Pt states lower quadrant for awhile and states she wants to be checked for STDs.      /93   Pulse 73   Temp 36.1 °C (97 °F) (Temporal)   Resp 16   Ht 1.626 m (5' 4\")   Wt 62.9 kg (138 lb 10.7 oz)   SpO2 100%   BMI 23.80 kg/m²     Pt ambulatory to triage for above, protocol and EKG ordered.   "

## 2022-03-02 NOTE — ED PROVIDER NOTES
ED Provider Note    CHIEF COMPLAINT  abdominal pain  concerned about sepsis  concerned about STI    HPI  Tonya Davila is a 41 y.o. female who presents here with the above complaints. Her main complaint is she has abdominal pain suprapubic cramping two days no associated urgency or frequency no diarrhea. Nonradiating. No associated nausea vomiting    She does have a past medical history significant for Klebsiella UTI that turned to sepsis and it sounds like osteomyelitis/internal muscle abscess that required surgery and IV antibiotics and one month hospitalization. She has diabetes and she was concerned because she felt slightly dizzy that she could be getting septic    In addition, she is concerned that a  may be unfaithful and she would like to get tested and treated for STI's. She also has a yeast infection with white discharge.    REVIEW OF SYSTEMS  General: No fever or chills.  Eyes: No eye discharge. No eye pain.  Ear nose throat: No sore throat or  trouble swallowing.  Pulmonary: No shortness of breath or cough.  Cardiovascular: No chest pain or chest pressure.  GI: No abdominal pain nausea or vomiting.  : see above    All other systems are negative      PAST MEDICAL HISTORY  Past Medical History:   Diagnosis Date   • Diabetes ([facility])    • Vaginal yeast infection 11/26/2018       FAMILY HISTORY  No family history on file.    SOCIAL HISTORY  Social History     Socioeconomic History   • Marital status:    Tobacco Use   • Smoking status: Former Smoker   • Smokeless tobacco: Never Used   Vaping Use   • Vaping Use: Never used   Substance and Sexual Activity   • Alcohol use: No   • Drug use: No   • Sexual activity: Yes     Birth control/protection: None       SURGICAL HISTORY  Past Surgical History:   Procedure Laterality Date   • IRRIGATION & DEBRIDEMENT ORTHO Right 12/17/2019    Procedure: IRRIGATION AND DEBRIDEMENT, WOUND- THIGH, AND ANTIBIOTIC DELIVERY DEVICE PLACEMENT;  Surgeon: Shamar ARCOS  "ROSY Rubin;  Location: SURGERY [facility];  Service: Orthopedics   • IRRIGATION & DEBRIDEMENT ORTHO Right 12/11/2019    Procedure: IRRIGATION AND DEBRIDEMENT, WOUND - THIGH;  Surgeon: Shamar Rubin M.D.;  Location: SURGERY [facility];  Service: Orthopedics       CURRENT MEDICATIONS  Home Medications     Reviewed by Ivonne Figueroa R.N. (Registered Nurse) on 03/02/22 at 0755  Med List Status: Partial   Medication Last Dose Status   fluconazole (DIFLUCAN) 150 MG tablet  Active   Insulin Admin Supplies Misc  Active                 ALLERGIES  Allergies   Allergen Reactions   • Bloodless        PHYSICAL EXAM  VITAL SIGNS: /93   Pulse 73   Temp 36.1 °C (97 °F) (Temporal)   Resp 16   Ht 1.626 m (5' 4\")   Wt 62.9 kg (138 lb 10.7 oz)   SpO2 100%   BMI 23.80 kg/m²   Constitutional: Well developed, Well nourished, No acute distress, Non-toxic appearance.   HENT: Normocephalic, Atraumatic, Bilateral external ears normal, Oropharynx moist, No oral exudates, Nose normal.   Eyes: PERRLA, EOMI, Conjunctiva normal, No discharge.   Musculoskeletal: Neck has normal range of motion, No tenderness, Supple.   Lymphatic: No cervical lymphadenopathy noted.   Cardiovascular: Normal heart rate, Normal rhythm, No murmurs, No rubs, No gallops.   Thorax & Lungs: Normal breath sounds, No respiratory distress, No wheezing, No chest tenderness.   Abdomen: nondistended nontender soft  Skin: Warm, Dry, No erythema, No rash.   : patient declined  Psychiatric: Calm, not anxious  Neurologic: Alert & oriented, moves all extremities equally    RADIOLOGY/PROCEDURES  Results for orders placed or performed during the hospital encounter of 03/02/22   CBC WITH DIFFERENTIAL   Result Value Ref Range    WBC 8.5 4.8 - 10.8 K/uL    RBC 4.78 4.20 - 5.40 M/uL    Hemoglobin 14.5 12.0 - 16.0 g/dL    Hematocrit 41.6 37.0 - 47.0 %    MCV 87.0 81.4 - 97.8 fL    MCH 30.3 27.0 - 33.0 pg    MCHC 34.9 33.6 - 35.0 g/dL    RDW 39.1 35.9 - 50.0 " fL    Platelet Count 288 164 - 446 K/uL    MPV 10.1 9.0 - 12.9 fL    Neutrophils-Polys 71.80 44.00 - 72.00 %    Lymphocytes 21.60 (L) 22.00 - 41.00 %    Monocytes 5.60 0.00 - 13.40 %    Eosinophils 0.20 0.00 - 6.90 %    Basophils 0.40 0.00 - 1.80 %    Immature Granulocytes 0.40 0.00 - 0.90 %    Nucleated RBC 0.00 /100 WBC    Neutrophils (Absolute) 6.13 2.00 - 7.15 K/uL    Lymphs (Absolute) 1.84 1.00 - 4.80 K/uL    Monos (Absolute) 0.48 0.00 - 0.85 K/uL    Eos (Absolute) 0.02 0.00 - 0.51 K/uL    Baso (Absolute) 0.03 0.00 - 0.12 K/uL    Immature Granulocytes (abs) 0.03 0.00 - 0.11 K/uL    NRBC (Absolute) 0.00 K/uL   COMP METABOLIC PANEL   Result Value Ref Range    Sodium 135 135 - 145 mmol/L    Potassium 3.7 3.6 - 5.5 mmol/L    Chloride 102 96 - 112 mmol/L    Co2 23 20 - 33 mmol/L    Anion Gap 10.0 7.0 - 16.0    Glucose 228 (H) 65 - 99 mg/dL    Bun 17 8 - 22 mg/dL    Creatinine 0.72 0.50 - 1.40 mg/dL    Calcium 9.4 8.5 - 10.5 mg/dL    AST(SGOT) 11 (L) 12 - 45 U/L    ALT(SGPT) 9 2 - 50 U/L    Alkaline Phosphatase 91 30 - 99 U/L    Total Bilirubin 0.2 0.1 - 1.5 mg/dL    Albumin 4.2 3.2 - 4.9 g/dL    Total Protein 7.3 6.0 - 8.2 g/dL    Globulin 3.1 1.9 - 3.5 g/dL    A-G Ratio 1.4 g/dL   LIPASE   Result Value Ref Range    Lipase 39 11 - 82 U/L   URINALYSIS    Specimen: Urine, Clean Catch   Result Value Ref Range    Color Yellow     Character Hazy (A)     Specific Gravity 1.025 <1.035    Ph 6.0 5.0 - 8.0    Glucose 500 (A) Negative mg/dL    Ketones Negative Negative mg/dL    Protein Negative Negative mg/dL    Bilirubin Negative Negative    Urobilinogen, Urine 0.2 Negative    Nitrite Positive (A) Negative    Leukocyte Esterase Small (A) Negative    Occult Blood Negative Negative    Micro Urine Req Microscopic    Blood Culture,Hold   Result Value Ref Range    Blood Culture Hold Collected    URINE MICROSCOPIC (W/UA)   Result Value Ref Range    WBC 20-50 (A) /hpf    RBC 0-2 /hpf    Bacteria Many (A) None /hpf    Epithelial  Cells Moderate (A) /hpf   ESTIMATED GFR   Result Value Ref Range    GFR If African American >60 >60 mL/min/1.73 m 2    GFR If Non African American >60 >60 mL/min/1.73 m 2   EKG   Result Value Ref Range    Report       [facility] Emergency Dept.    Test Date:  2022  Pt Name:    JASON NOONAN               Department: ER  MRN:        [id number]                      Room:  Gender:     Female                       Technician: [id number]  :        [id number]                   Requested By:ER TRIAGE PROTOCOL  Order #:    [id number]                    Reading MD:    Measurements  Intervals                                Axis  Rate:       60                           P:          17  WY:         132                          QRS:        45  QRSD:       84                           T:          23  QT:         424  QTc:        424    Interpretive Statements  SINUS RHYTHM  Compared to ECG 2018 08:39:19  No significant changes          COURSE & MEDICAL DECISION MAKING  Pertinent Labs & Imaging studies reviewed. (See chart for details)  41-year-old female with a chief complaint of superior pain dizziness and concern for sepsis    abdominal pain/sepsis/UTI  the patient's urinalysis is special for UTI STDs possible she makes no clinical signs of sepsis at this time does not trigger servers. Therefore my suspicion for sepsis is low. At this point    Abdominal pain  suprapubic most likely UTIs urine is ready back appendicitis less likely gallbladder disease, colon disease further surgical emergency less likely at this time and also to barbarians abscess    plan  pelvic exam  STI treatment and screening      Yeast infection  will consider Diflucan treatment after antibiotic    10:34 AM  patient declined pelvic exam she will give herself self swabbing. She instructed that this as per the recent studies are not 100% but pretty sensitive she is also being treated for STI's at this point the patient will go ahead  and self administer and she was instructed by the technician who has done this in the urgent care is.    I also returned she is increasing pain fever chills. This point is a well appearing 41-year-old diabetic with sinus symptoms of UTI and some pelvic discomfort. At this point my suspicion for certain surgical or life-threatening is low at this time but she understands that condition could change so she is to return if symptoms worsen    FINAL IMPRESSION  1. UTI  2. Pelvic pain  3. History of Candida infections      Electronically signed by: Gautam Elias M.D., 3/2/2022 10:02 AM

## 2022-03-02 NOTE — ED NOTES
Pt given discharge instructions/prescription sent to pharm of choosing/ home care instructions explained, pt verbalized understanding of instructions given/pt understands the importance of follow up, pt ambulatory to ER [facility].

## 2022-03-02 NOTE — ED NOTES
PT amb to RM 64 w/ steady gate, told to undress and put on gown. Warm blankets provided. Chart up for ERP

## 2022-03-03 ENCOUNTER — TELEPHONE (OUTPATIENT)
Dept: PHARMACY | Facility: MEDICAL CENTER | Age: 42
End: 2022-03-03

## 2022-03-03 DIAGNOSIS — Z79.4 TYPE 2 DIABETES MELLITUS WITH HYPERGLYCEMIA, WITH LONG-TERM CURRENT USE OF INSULIN (HCC): ICD-10-CM

## 2022-03-03 DIAGNOSIS — E11.65 TYPE 2 DIABETES MELLITUS WITH HYPERGLYCEMIA, WITH LONG-TERM CURRENT USE OF INSULIN (HCC): ICD-10-CM

## 2022-03-03 RX ORDER — CALCIUM CITRATE/VITAMIN D3 200MG-6.25
1 TABLET ORAL 3 TIMES DAILY
Qty: 100 STRIP | Refills: 0 | Status: SHIPPED | OUTPATIENT
Start: 2022-03-03 | End: 2022-04-02

## 2022-03-04 NOTE — ED NOTES
"ED Positive Culture Follow-up/Notification Note:    Date: 3/3/22     Patient seen in the ED on 3/2/2022 for abdominal pain, concern for STI and white discharge concerning for yeast infection.   .   1. Acute UTI    2. Candida infection    3. Type 2 diabetes mellitus with hyperglycemia, with long-term current use of insulin ([facility])    4. Pelvic pain       Discharge Medication List as of 3/2/2022 11:02 AM      START taking these medications    Details   cefdinir (OMNICEF) 300 MG Cap Take 1 Capsule by mouth 2 times a day for 4 days., Disp-20 Capsule, R-0, Normal      doxycycline (MONODOX) 100 MG capsule Take 1 Capsule by mouth 2 times a day for 7 days., Disp-14 Capsule, R-0, Normal      glucose blood (TRUE METRIX BLOOD GLUCOSE TEST) strip 1 Strip by Other route as needed., Disp-100 Strip, R-1, Normal             Allergies: Bloodless     Vitals:    03/02/22 0730 03/02/22 0750 03/02/22 1100   BP: 151/93  135/88   Pulse: 73  75   Resp: 16  16   Temp: 36.1 °C (97 °F)  36.3 °C (97.3 °F)   TempSrc: Temporal  Temporal   SpO2: 100%  98%   Weight:  62.9 kg (138 lb 10.7 oz)    Height:  1.626 m (5' 4\")        Final cultures:   Results     Procedure Component Value Units Date/Time    Chlamydia & N.gonorrhoeae by PCR [[id number]] Collected: 03/02/22 1040    Order Status: Completed Updated: 03/02/22 2119     C. trachomatis by PCR Negative     N. gonorrhoeae by PCR Negative     Source Vaginal    URINALYSIS [[id number]]  (Abnormal) Collected: 03/02/22 0815    Order Status: Completed Specimen: Urine, Clean Catch Updated: 03/02/22 0906     Color Yellow     Character Hazy     Specific Gravity 1.025     Ph 6.0     Glucose 500 mg/dL      Ketones Negative mg/dL      Protein Negative mg/dL      Bilirubin Negative     Urobilinogen, Urine 0.2     Nitrite Positive     Leukocyte Esterase Small     Occult Blood Negative     Micro Urine Req Microscopic    Blood Culture,Hold [[id number]] Collected: 03/02/22 0811    Order Status: Completed Updated: " 03/02/22 0821     Blood Culture Hold Collected    WET PREP [[id number]] Collected: 03/02/22 0000    Order Status: Canceled Specimen: Vaginal     Chlamydia/GC PCR (Urine) [[id number]] Collected: 03/02/22 0000    Order Status: Canceled Specimen: Genital           Plan:   Culture result negative for STI, but positive for yeast infection. Called pt and encouraged antibiotic compliance and instructed her to stop taking doxycycline given negative STI work up.    Dakota Watters, PharmD  Discussed with Alondra Ham, PharmD

## 2022-08-19 ENCOUNTER — HOSPITAL ENCOUNTER (OUTPATIENT)
Facility: MEDICAL CENTER | Age: 42
End: 2022-08-19
Attending: NURSE PRACTITIONER
Payer: COMMERCIAL

## 2022-08-19 ENCOUNTER — TELEPHONE (OUTPATIENT)
Dept: OBGYN | Facility: CLINIC | Age: 42
End: 2022-08-19

## 2022-08-19 ENCOUNTER — GYNECOLOGY VISIT (OUTPATIENT)
Dept: OBGYN | Facility: CLINIC | Age: 42
End: 2022-08-19
Payer: COMMERCIAL

## 2022-08-19 VITALS — WEIGHT: 132.6 LBS | SYSTOLIC BLOOD PRESSURE: 124 MMHG | DIASTOLIC BLOOD PRESSURE: 80 MMHG | BODY MASS INDEX: 22.76 KG/M2

## 2022-08-19 DIAGNOSIS — Z01.419 WELL WOMAN EXAM WITH ROUTINE GYNECOLOGICAL EXAM: ICD-10-CM

## 2022-08-19 DIAGNOSIS — E11.9 TYPE 2 DIABETES MELLITUS WITHOUT COMPLICATION, WITH LONG-TERM CURRENT USE OF INSULIN (HCC): ICD-10-CM

## 2022-08-19 DIAGNOSIS — Z79.4 TYPE 2 DIABETES MELLITUS WITHOUT COMPLICATION, WITH LONG-TERM CURRENT USE OF INSULIN (HCC): ICD-10-CM

## 2022-08-19 DIAGNOSIS — B37.31 VAGINAL YEAST INFECTION: ICD-10-CM

## 2022-08-19 DIAGNOSIS — R35.0 URINARY FREQUENCY: ICD-10-CM

## 2022-08-19 LAB
APPEARANCE UR: CLEAR
BILIRUB UR STRIP-MCNC: NORMAL MG/DL
COLOR UR AUTO: YELLOW
GLUCOSE UR STRIP.AUTO-MCNC: >=1000 MG/DL
KETONES UR STRIP.AUTO-MCNC: NEGATIVE MG/DL
LEUKOCYTE ESTERASE UR QL STRIP.AUTO: NEGATIVE
NITRITE UR QL STRIP.AUTO: NEGATIVE
PH UR STRIP.AUTO: 5.5 [PH] (ref 5–8)
PROT UR QL STRIP: NEGATIVE MG/DL
RBC UR QL AUTO: NEGATIVE
SP GR UR STRIP.AUTO: 1.01
UROBILINOGEN UR STRIP-MCNC: NORMAL MG/DL

## 2022-08-19 PROCEDURE — 87624 HPV HI-RISK TYP POOLED RSLT: CPT

## 2022-08-19 PROCEDURE — 88175 CYTOPATH C/V AUTO FLUID REDO: CPT

## 2022-08-19 PROCEDURE — 81002 URINALYSIS NONAUTO W/O SCOPE: CPT | Performed by: NURSE PRACTITIONER

## 2022-08-19 PROCEDURE — 87591 N.GONORRHOEAE DNA AMP PROB: CPT

## 2022-08-19 PROCEDURE — 87491 CHLMYD TRACH DNA AMP PROBE: CPT

## 2022-08-19 PROCEDURE — G0101 CA SCREEN;PELVIC/BREAST EXAM: HCPCS | Performed by: NURSE PRACTITIONER

## 2022-08-19 RX ORDER — INSULIN GLARGINE 100 [IU]/ML
7 INJECTION, SOLUTION SUBCUTANEOUS
Qty: 1 EACH | Refills: 1 | Status: SHIPPED | OUTPATIENT
Start: 2022-08-19 | End: 2022-12-07

## 2022-08-19 RX ORDER — FLUCONAZOLE 200 MG/1
200 TABLET ORAL DAILY
Qty: 2 TABLET | Refills: 1 | Status: SHIPPED | OUTPATIENT
Start: 2022-08-19 | End: 2022-12-07

## 2022-08-19 RX ORDER — FLUCONAZOLE 200 MG/1
200 TABLET ORAL DAILY
Qty: 2 TABLET | Refills: 1 | Status: SHIPPED | OUTPATIENT
Start: 2022-08-19 | End: 2022-08-21

## 2022-08-19 ASSESSMENT — FIBROSIS 4 INDEX: FIB4 SCORE: 0.53

## 2022-08-19 NOTE — PROGRESS NOTES
Patient here c/o patient is here today for an   LMP= 08/08/2022  BCM: none  Last pap date/result: due for one today  Last mammogram if applicable due for one  Phone number: [phone]  Pharmacy confirmed.

## 2022-08-19 NOTE — TELEPHONE ENCOUNTER
[facility] pharmacy [facility] called stating that that ordering provider Paulette Keyes ordered Diflucan for pt but that the Rx needs to be a generic Rx to process prescription.     Informed Brandie I will rely message  to ordering provider for Rx to be ordered Generic.

## 2022-08-19 NOTE — PROGRESS NOTES
Tonya Davila is a 42 y.o. y.o. female who presents for her Gynecologic Exam        HPI Comments: Pt presents for well woman exam. Pt has many complaints today. Patient's last menstrual period was 2022 (exact date).    Tonya is here today for a well woman exam. She is due for pap and mammogram.  She is a  with hx of c/s x 4 with BTL at last delivery. Denies any pregnancy or delivery complications.  She is currently sexually active with 1 male partner. This is a new partner, so she desires GC/CT testing. She declines any blood tests.  She states she gets regular monthly periods, last about 5-7 days with light to moderate bleeding.   Never had a mammogram- ordered today.    Today, she is complaining of a chronic yeast infection. States she feels very irritated and itchy and that her partner is also complaining of itching.   She states she is a type 2 diabetic, ran out of her insulin. She desires a refill today. She doesn't currently see a PCP as they no longer take her insurance.    Review of Systems   Pertinent positives documented in HPI and all other systems reviewed & are negative    All PMH, PSH, allergies, social history and FH reviewed and updated today:  Past Medical History:   Diagnosis Date    Diabetes (HCC)     Vaginal yeast infection 2018     Past Surgical History:   Procedure Laterality Date    IRRIGATION & DEBRIDEMENT ORTHO Right 2019    Procedure: IRRIGATION AND DEBRIDEMENT, WOUND- THIGH, AND ANTIBIOTIC DELIVERY DEVICE PLACEMENT;  Surgeon: Shamar Rubin M.D.;  Location: SURGERY [facility];  Service: Orthopedics    IRRIGATION & DEBRIDEMENT ORTHO Right 2019    Procedure: IRRIGATION AND DEBRIDEMENT, WOUND - THIGH;  Surgeon: Shamar Rubin M.D.;  Location: SURGERY [facility];  Service: Orthopedics    PRIMARY C SECTION       Bloodless  Social History     Socioeconomic History    Marital status:    Tobacco Use    Smoking status: Former     Smokeless tobacco: Never   Vaping Use    Vaping Use: Never used   Substance and Sexual Activity    Alcohol use: No    Drug use: No    Sexual activity: Yes     Birth control/protection: None     Family History   Problem Relation Age of Onset    No Known Problems Mother     No Known Problems Father      Medications:   Current Outpatient Medications Ordered in Epic   Medication Sig Dispense Refill    DIFLUCAN 200 MG Tab Take 1 Tablet by mouth every day for 2 days. Take first tablet today, then repeat dose in 3 days 2 Tablet 1    miconazole (MICOTIN) 2 % Cream Apply 1 Application topically 2 times a day. 56 g 1    insulin glargine 100 UNIT/ML Solution Pen-injector injection Inject 7 Units under the skin 3 times a day before meals. Titrate dosage based on blood sugar levels 1 Each 1    fluconazole (DIFLUCAN) 150 MG tablet 150 mg  three tablets  one tablet by mouth every three days nine days total 3 Tablet 2    Insulin Admin Supplies Misc  (Patient not taking: Reported on 1/14/2022)       No current [facility]-ordered facility-administered medications on file.          Objective:   Vital measurements:  /80   Wt 60.1 kg (132 lb 9.6 oz)   Body mass index is 22.76 kg/m². (Goal BM I>18 <25)    Physical Exam     Chaperone offered: yes    Nursing note and vitals reviewed.  Constitutional: She is oriented to person, place, and time. She appears well-developed and well-nourished. No distress.     HEENT:   Head: Normocephalic and atraumatic.   Right Ear: External ear normal.   Left Ear: External ear normal.   Nose: Nose normal.   Eyes: Conjunctivae and EOM are normal. Pupils are equal, round, and reactive to light. No scleral icterus.     Neck: Normal range of motion. Neck supple. No tracheal deviation present. No thyromegaly present.     Pulmonary/Chest: Effort normal and breath sounds normal. No respiratory distress. She has no wheezes. She has no rales. She exhibits no tenderness.     Cardiovascular: Regular, rate and rhythm.  No JVD.    Abdominal: Soft. Bowel sounds are normal. She exhibits no distension and no mass. No tenderness. She has no rebound and no guarding.     Breast:  Symmetrical, normal consistency without masses., No dimpling or skin changes, Normal nipples without discharge, no axillary lymphadenopathy, negative, Inspection negative. No nipple discharge or bleeding, No masses    Genitourinary:  Pelvic exam was performed with patient supine.  External genitalia with no abnormal pigmentation, labial fusion,rash, tenderness, lesion or injury to the labia bilaterally. Erythema present on the external labia, specifically in between labia majora and minora.  Vagina is moist with no lesions or bleeding. She has erythema all along the vaginal walls. Copious white clumpy discharge noted on the vaginal walls. No foreign body around the vagina or signs of injury.   Cervix exhibits no motion tenderness, no discharge and no friability. Nabothian cyst noted on the anterior lip of the cervix, approx 0.5 cm in diameter.  Uterus is mid-position, not deviated, not enlarged, not fixed and not tender.  Right adnexum displays no mass, no tenderness and no fullness. Left adnexum displays no mass, no tenderness and no fullness.   Bladder exhibits no tenderness and is normal is size and contour. No prolapse noted  Urethra is non tender and no discharge noted.  Anus is without hemorrhoids or prolapse noted. No bleeding on exam.    Musculoskeletal: Normal range of motion. She exhibits no edema and no tenderness.     Lymphadenopathy: She has no cervical adenopathy.     Neurological: She is alert and oriented to person, place, and time. She exhibits normal muscle tone.     Skin: Skin is warm and dry. No rash noted. She is not diaphoretic. No erythema. No pallor.     Psychiatric: She has a normal mood and affect. Her behavior is normal. Judgment and thought content normal.        Assessment:     1. Well woman exam with routine gynecological exam   MA-SCREENING MAMMO LEFT W/TOMOSYNTHESIS W/CAD    THINPREP PAP W/HPV AND CTNG    CANCELED: THINPREP PAP WITH HPV      2. Vaginal yeast infection  DIFLUCAN 200 MG Tab    miconazole (MICOTIN) 2 % Cream      3. Type 2 diabetes mellitus without complication, with long-term current use of insulin ([facility])  insulin glargine 100 UNIT/ML Solution Pen-injector injection    HEMOGLOBIN A1C    Referral to establish with [facility] PCP      4. Urinary frequency  POCT Urinalysis        Discussed in detail, that I will treat this current yeast infection, but that she is going to continue to get them and probably not feel better until she gets her diabetes under control  I refilled her insulin, sent meds for yeast, and sent urgent PCP referral.  Hemoglobin A1C ordered today.    Plan:   Pap and physical exam performed  Monthly SBE.  Counseling: breast self exam, mammography screening, STD prevention, menopause, osteoporosis, and adequate intake of calcium and vitamin D  Encourage exercise and proper diet.  Mammograms starting @ age 40 annually.  See medications and orders placed in encounter report.    Will call with abnormal results  Follow up annually or sooner GABRIELLE NOLASCOM, APRN

## 2022-08-22 LAB
C TRACH DNA GENITAL QL NAA+PROBE: NEGATIVE
CYTOLOGY REG CYTOL: NORMAL
HPV HR 12 DNA CVX QL NAA+PROBE: NEGATIVE
HPV16 DNA SPEC QL NAA+PROBE: NEGATIVE
HPV18 DNA SPEC QL NAA+PROBE: NEGATIVE
N GONORRHOEA DNA GENITAL QL NAA+PROBE: NEGATIVE
SPECIMEN SOURCE: NORMAL
SPECIMEN SOURCE: NORMAL

## 2022-09-07 ENCOUNTER — TELEPHONE (OUTPATIENT)
Dept: SCHEDULING | Facility: IMAGING CENTER | Age: 42
End: 2022-09-07
Payer: COMMERCIAL

## 2022-10-05 ENCOUNTER — APPOINTMENT (OUTPATIENT)
Dept: INTERNAL MEDICINE | Facility: OTHER | Age: 42
End: 2022-10-05
Payer: COMMERCIAL

## 2022-11-14 ENCOUNTER — TELEPHONE (OUTPATIENT)
Dept: SCHEDULING | Facility: IMAGING CENTER | Age: 42
End: 2022-11-14
Payer: COMMERCIAL

## 2022-12-07 ENCOUNTER — OFFICE VISIT (OUTPATIENT)
Dept: INTERNAL MEDICINE | Facility: OTHER | Age: 42
End: 2022-12-07
Payer: COMMERCIAL

## 2022-12-07 VITALS
WEIGHT: 130.4 LBS | HEART RATE: 89 BPM | TEMPERATURE: 99.1 F | HEIGHT: 64 IN | BODY MASS INDEX: 22.26 KG/M2 | DIASTOLIC BLOOD PRESSURE: 78 MMHG | SYSTOLIC BLOOD PRESSURE: 120 MMHG | OXYGEN SATURATION: 99 %

## 2022-12-07 DIAGNOSIS — E11.65 TYPE 2 DIABETES MELLITUS WITH HYPERGLYCEMIA, WITH LONG-TERM CURRENT USE OF INSULIN (HCC): ICD-10-CM

## 2022-12-07 DIAGNOSIS — Z79.4 TYPE 2 DIABETES MELLITUS WITH HYPERGLYCEMIA, WITH LONG-TERM CURRENT USE OF INSULIN (HCC): ICD-10-CM

## 2022-12-07 PROBLEM — M86.051: Status: RESOLVED | Noted: 2019-12-11 | Resolved: 2022-12-07

## 2022-12-07 LAB
HBA1C MFR BLD: 14.4 % (ref 0–5.6)
INT CON NEG: NEGATIVE
INT CON POS: POSITIVE

## 2022-12-07 PROCEDURE — 83036 HEMOGLOBIN GLYCOSYLATED A1C: CPT | Performed by: STUDENT IN AN ORGANIZED HEALTH CARE EDUCATION/TRAINING PROGRAM

## 2022-12-07 PROCEDURE — 99203 OFFICE O/P NEW LOW 30 MIN: CPT | Mod: GC | Performed by: STUDENT IN AN ORGANIZED HEALTH CARE EDUCATION/TRAINING PROGRAM

## 2022-12-07 PROCEDURE — 92250 FUNDUS PHOTOGRAPHY W/I&R: CPT | Performed by: STUDENT IN AN ORGANIZED HEALTH CARE EDUCATION/TRAINING PROGRAM

## 2022-12-07 RX ORDER — INSULIN GLARGINE-YFGN 100 [IU]/ML
20 INJECTION, SOLUTION SUBCUTANEOUS 2 TIMES DAILY
COMMUNITY
Start: 2022-11-21

## 2022-12-07 RX ORDER — SYRING-NEEDL,DISP,INSUL,0.3 ML 30 GX5/16"
SYRINGE, EMPTY DISPOSABLE MISCELLANEOUS
Qty: 1 EACH | Refills: 3 | Status: SHIPPED | OUTPATIENT
Start: 2022-12-07

## 2022-12-07 RX ORDER — LANCETS 30 GAUGE
EACH MISCELLANEOUS
Qty: 100 EACH | Refills: 0 | Status: SHIPPED | OUTPATIENT
Start: 2022-12-07

## 2022-12-07 ASSESSMENT — ENCOUNTER SYMPTOMS
CHILLS: 0
PALPITATIONS: 0
VOMITING: 0
HEADACHES: 0
HEARTBURN: 0
DIZZINESS: 0
DOUBLE VISION: 0
FEVER: 0
NAUSEA: 0
SHORTNESS OF BREATH: 0
BLURRED VISION: 0

## 2022-12-07 ASSESSMENT — FIBROSIS 4 INDEX: FIB4 SCORE: 0.53

## 2022-12-07 ASSESSMENT — PATIENT HEALTH QUESTIONNAIRE - PHQ9: CLINICAL INTERPRETATION OF PHQ2 SCORE: 0

## 2022-12-08 NOTE — PROGRESS NOTES
Chief Complaint   Patient presents with    New Patient    Diabetes    Referral Needed     Endocrinology        HISTORY OF PRESENT ILLNESS: Patient is a 42 y.o. female new patient who presents today for the following.      Patient presenting today to establish care with this clinic.  Patient also here to discuss management of her diabetes.  Patient has a history of what is suspected to be type 2 diabetes, but acknowledges that it has been poorly controlled.  Patient states she checked her A1c at work the other day and noted that it was 14.2%.  Unfortunately, patient notes that her sugars are poorly controlled (admits to drinking significant amounts of soda and other sugar containing foods), and when she checks her glucose levels at home they have ranged anywhere from the 100s to 300s.  Patient is more so concerned as well due to recently experiencing tingling sensations in her feet.    Patient is motivated to improve her overall diabetes management through diet and consistent use of her medications.  Patient admits she was in denial regarding her diagnosis for several years, but as she is now experiencing symptoms of tingling in her feet, she is motivated to make the necessary changes to optimize her diabetes.      Past Medical History:   Diagnosis Date    Diabetes ([facility])     Vaginal yeast infection 11/26/2018       Patient Active Problem List    Diagnosis Date Noted    Type 2 diabetes mellitus with hyperglycemia, with long-term current use of insulin ([facility]) 12/11/2019       Allergies:Bloodless    Current Outpatient Medications   Medication Sig Dispense Refill    Insulin Glargine-yfgn 100 UNIT/ML Solution Pen-injector Inject 20 Units as directed 2 times a day.      Lancets Use one One Touch Ultra 2 lancet to test blood sugar three times daily. 100 Each 0    Lancet Device Misc Use lancet device with lancets to check blood sugar daily 1 Each 3     No current facility-administered medications for this visit.       Social  "History     Tobacco Use    Smoking status: Former     Packs/day: 0.50     Years: 4.00     Pack years: 2.00     Types: Cigarettes    Smokeless tobacco: Never   Vaping Use    Vaping Use: Never used   Substance Use Topics    Alcohol use: No    Drug use: No       Family History   Problem Relation Age of Onset    No Known Problems Mother     No Known Problems Father          Review of Systems   Review of Systems   Constitutional:  Negative for chills and fever.   Eyes:  Negative for blurred vision and double vision.   Respiratory:  Negative for shortness of breath.    Cardiovascular:  Negative for chest pain and palpitations.   Gastrointestinal:  Negative for heartburn, nausea and vomiting.   Genitourinary:  Negative for dysuria and urgency.   Neurological:  Negative for dizziness and headaches.     Exam:  /78 (BP Location: Left arm)   Pulse 89   Temp 37.3 °C (99.1 °F) (Temporal)   Ht 1.626 m (5' 4\")   Wt 59.1 kg (130 lb 6.4 oz)   SpO2 99%  Body mass index is 22.38 kg/m².    Constitutional:  Not in acute distress, well appearing.  HEENT:   NC/AT  Cardiovascular: Regular rate and rhythm. No murmurs or gallops.      Lungs:   Clear to auscultation bilaterally. No wheezes or crackles. No respiratory distress.  Abdomen: Not distended, soft, not tender. No guarding or rigidity. No masses.  Extremities:  No cyanosis/clubbing/edema. No obvious deformities.  Monofilament test within normal limits for bilateral extremities  Skin:  Warm and dry.  No visible rashes.  Neurologic: Alert & oriented x 3, CN II-XII grossly intact, strength and sensation grossly intact.  No focal deficits noted.  Psychiatric:  Affect normal, mood normal, judgment normal.    Assessment/Plan:     1. Type 2 diabetes mellitus with hyperglycemia, with long-term current use of insulin (HCC)  Patient with history of above, A1c completed today 14.4%.  Will order labs as below for continued monitoring and management of potential underlying " comorbidities.  We will also order C-peptide in order to assess for potential underlying type 1 diabetes  - CBC WITH DIFFERENTIAL; Future  - Comp Metabolic Panel; Future  - MICROALBUMIN CREAT RATIO URINE; Future  - Lipid Profile; Future  - C-PEPTIDE; Future  - Referral to Endocrinology placed, appreciate their assistance  - POCT Retinal Eye Exam obtained in clinic  - Diabetic Monofilament LE Exam completed in clinic, unremarkable  - Patient respectfully declines diabetes education and nutrition consult at this time    #General Wellness  Patient respectfully declines routine vaccinations  - Patient reports having been tested in the past for hepatitis C and HIV in [place], patient however unsure of location of testing.  Patient will attempt to provide us with providers information in order to request records.    All imaging results and lab results and consult notes are reviewed at this visit.  Followup: Return in about 5 weeks (around 1/11/2023) for Lab follow-up, continue diabetes education.    Please note that this dictation was created using voice recognition software. I have made every reasonable attempt to correct obvious errors, but I expect that there are errors of grammar and possibly content that I did not discover before finalizing the note.    Noam Nettles MD  PGY-2  Internal Medicine

## 2022-12-08 NOTE — PATIENT INSTRUCTIONS
Thank you for coming in today, please follow-up with me in 5-6 weeks  Please get labs drawn (fasting) at least 1 week before our appointment. [facility] is probably the best lab for you with your insurance  Please call our clinic if you have any future questions or concerns, or if you'd like to be seen sooner for any reason  Enjoy the holiday season!

## 2022-12-09 NOTE — ED NOTES
Dr. Boogie at bedside   Calcipotriene Pregnancy And Lactation Text: This medication has not been proven safe during pregnancy. It is unknown if this medication is excreted in breast milk.

## 2022-12-13 LAB — RETINAL SCREEN: NEGATIVE

## 2022-12-14 ENCOUNTER — NON-PROVIDER VISIT (OUTPATIENT)
Dept: OCCUPATIONAL MEDICINE | Facility: CLINIC | Age: 42
End: 2022-12-14

## 2022-12-14 DIAGNOSIS — Z02.1 PRE-EMPLOYMENT DRUG SCREENING: ICD-10-CM

## 2022-12-14 PROCEDURE — 80305 DRUG TEST PRSMV DIR OPT OBS: CPT | Performed by: NURSE PRACTITIONER

## 2022-12-15 LAB
AMP AMPHETAMINE: NORMAL
BAR BARBITURATES: NORMAL
BZO BENZODIAZEPINES: NORMAL
COC COCAINE: NORMAL
INT CON NEG: NORMAL
INT CON POS: NORMAL
MDMA ECSTASY: NORMAL
MET METHAMPHETAMINES: NORMAL
MTD METHADONE: NORMAL
OPI OPIATES: NORMAL
OXY OXYCODONE: NORMAL
PCP PHENCYCLIDINE: NORMAL
POC URINE DRUG SCREEN OCDRS: NORMAL
THC: NORMAL

## 2025-01-23 NOTE — ANESTHESIA POSTPROCEDURE EVALUATION
Patient: Tonya Bro    Procedure Summary     Date:  12/11/19 Room / Location:  [place] [id number] / SURGERY [facility]    Anesthesia Start:  0858 Anesthesia Stop:  1024    Procedure:  IRRIGATION AND DEBRIDEMENT, WOUND - THIGH (Right Thigh) Diagnosis:  (Right Tiigh Wound)    Surgeon:  Shamar Rubin M.D. Responsible Provider:  Wellington Noel M.D.    Anesthesia Type:  general ASA Status:  3          Final Anesthesia Type: general  Last vitals  BP   Blood Pressure: 119/63    Temp   36.6 °C (97.8 °F)    Pulse   Pulse: 60   Resp   13    SpO2   98 %      Anesthesia Post Evaluation    Patient location during evaluation: PACU  Patient participation: complete - patient participated  Level of consciousness: awake and alert  Pain score: 3    Airway patency: patent  Anesthetic complications: no  Cardiovascular status: hemodynamically stable  Respiratory status: acceptable  Hydration status: euvolemic    PONV: none           Nurse Pain Score: 0 (NPRS)         Yes

## (undated) DEVICE — BANDAGE ELASTIC 6 IN X 5 YDS - LATEX FREE (10/BX)

## (undated) DEVICE — NEPTUNE 4 PORT MANIFOLD - (20/PK)

## (undated) DEVICE — HANDPIECE 10FT INTPLS SCT PLS IRRIGATION HAND CONTROL SET (6/PK)

## (undated) DEVICE — SET IRRIGATION CYSTOSCOPY TUBE L80 IN (20EA/CA)

## (undated) DEVICE — SUTURE 2-0 MONOCRYL PLUS UNDYED CT-1 1 X 36 (36EA/BX)"

## (undated) DEVICE — SUTURE 3-0 ETHILON PS-1 (36PK/BX)

## (undated) DEVICE — SLEEVE, VASO, THIGH, MED

## (undated) DEVICE — SENSOR SPO2 NEO LNCS ADHESIVE (20/BX) SEE USER NOTES

## (undated) DEVICE — CANISTER SUCTION 3000ML MECHANICAL FILTER AUTO SHUTOFF MEDI-VAC NONSTERILE LF DISP  (40EA/CA)

## (undated) DEVICE — TRAY SKIN SCRUB PVP WET (20EA/CA) PART #DYND70356 DISCONTINUED

## (undated) DEVICE — SUCTION INSTRUMENT YANKAUER BULBOUS TIP W/O VENT (50EA/CA)

## (undated) DEVICE — CHLORAPREP 26 ML APPLICATOR - ORANGE TINT(25/CA)

## (undated) DEVICE — SUTURE 3-0 ETHILON FSLX 30 (36PK/BX)"

## (undated) DEVICE — PACK LOWER EXTREMITY - (2/CA)

## (undated) DEVICE — GLOVE BIOGEL SZ 7.5 SURGICAL PF LTX - (50PR/BX 4BX/CA)

## (undated) DEVICE — TUBING CLEARLINK DUO-VENT - C-FLO (48EA/CA)

## (undated) DEVICE — GOWN WARMING STANDARD FLEX - (30/CA)

## (undated) DEVICE — SUTURE 2-0 VICRYL PLUS CT-1 - 8 X 18 INCH(12/BX)

## (undated) DEVICE — SODIUM CHL IRRIGATION 0.9% 1000ML (12EA/CA)

## (undated) DEVICE — ELECTRODE DUAL RETURN W/ CORD - (50/PK)

## (undated) DEVICE — WATER IRRIG. STER. 1500 ML - (9/CA)

## (undated) DEVICE — SUTURE 0 PDS CT-1 CR 8 X 18 (12PK/BX)"

## (undated) DEVICE — PROTECTOR ULNA NERVE - (36PR/CA)

## (undated) DEVICE — GLOVE BIOGEL INDICATOR SZ 7.5 SURGICAL PF LTX - (50PR/BX 4BX/CA)

## (undated) DEVICE — PAD LAP STERILE 18 X 18 - (5/PK 40PK/CA)

## (undated) DEVICE — SUTURE GENERAL

## (undated) DEVICE — HEAD HOLDER JUNIOR/ADULT

## (undated) DEVICE — KIT ANESTHESIA W/CIRCUIT & 3/LT BAG W/FILTER (20EA/CA)

## (undated) DEVICE — ELECTRODE 850 FOAM ADHESIVE - HYDROGEL RADIOTRNSPRNT (50/PK)

## (undated) DEVICE — SET LEADWIRE 5 LEAD BEDSIDE DISPOSABLE ECG (1SET OF 5/EA)

## (undated) DEVICE — SET EXTENSION WITH 2 PORTS (48EA/CA) ***PART #2C8610 IS A SUBSTITUTE*****

## (undated) DEVICE — SPONGE DRAIN 4 X 4IN 6-PLY - (2/PK25PK/BX12BX/CS)

## (undated) DEVICE — MASK ANESTHESIA ADULT  - (100/CA)

## (undated) DEVICE — KIT ROOM DECONTAMINATION

## (undated) DEVICE — LACTATED RINGERS INJ 1000 ML - (14EA/CA 60CA/PF)

## (undated) DEVICE — GUARD SPLASH FOR PULSEVAC (12EA/PK)